# Patient Record
Sex: MALE | Race: WHITE | NOT HISPANIC OR LATINO | URBAN - METROPOLITAN AREA
[De-identification: names, ages, dates, MRNs, and addresses within clinical notes are randomized per-mention and may not be internally consistent; named-entity substitution may affect disease eponyms.]

---

## 2016-10-21 RX ORDER — LEVOTHYROXINE SODIUM 125 MCG
1 TABLET ORAL
Qty: 30 | Refills: 2 | OUTPATIENT
Start: 2016-10-21 | End: 2018-02-12

## 2017-03-04 ENCOUNTER — INPATIENT (INPATIENT)
Facility: HOSPITAL | Age: 52
LOS: 7 days | Discharge: HOME | DRG: 871 | End: 2017-03-12
Attending: HOSPITALIST | Admitting: HOSPITALIST
Payer: COMMERCIAL

## 2017-03-04 VITALS
HEART RATE: 112 BPM | OXYGEN SATURATION: 100 % | DIASTOLIC BLOOD PRESSURE: 130 MMHG | RESPIRATION RATE: 20 BRPM | SYSTOLIC BLOOD PRESSURE: 217 MMHG

## 2017-03-04 DIAGNOSIS — R41.82 ALTERED MENTAL STATUS, UNSPECIFIED: ICD-10-CM

## 2017-03-04 DIAGNOSIS — E13.10 OTHER SPECIFIED DIABETES MELLITUS WITH KETOACIDOSIS WITHOUT COMA: ICD-10-CM

## 2017-03-04 DIAGNOSIS — A41.9 SEPSIS, UNSPECIFIED ORGANISM: ICD-10-CM

## 2017-03-04 DIAGNOSIS — Z90.89 ACQUIRED ABSENCE OF OTHER ORGANS: Chronic | ICD-10-CM

## 2017-03-04 DIAGNOSIS — I10 ESSENTIAL (PRIMARY) HYPERTENSION: ICD-10-CM

## 2017-03-04 DIAGNOSIS — Z98.52 VASECTOMY STATUS: Chronic | ICD-10-CM

## 2017-03-04 DIAGNOSIS — I16.1 HYPERTENSIVE EMERGENCY: ICD-10-CM

## 2017-03-04 DIAGNOSIS — R63.8 OTHER SYMPTOMS AND SIGNS CONCERNING FOOD AND FLUID INTAKE: ICD-10-CM

## 2017-03-04 DIAGNOSIS — I63.9 CEREBRAL INFARCTION, UNSPECIFIED: ICD-10-CM

## 2017-03-04 DIAGNOSIS — J98.8 OTHER SPECIFIED RESPIRATORY DISORDERS: ICD-10-CM

## 2017-03-04 DIAGNOSIS — Z41.8 ENCOUNTER FOR OTHER PROCEDURES FOR PURPOSES OTHER THAN REMEDYING HEALTH STATE: ICD-10-CM

## 2017-03-04 DIAGNOSIS — E03.9 HYPOTHYROIDISM, UNSPECIFIED: ICD-10-CM

## 2017-03-04 LAB
ALBUMIN SERPL ELPH-MCNC: 3 G/DL — LOW (ref 3.4–5)
ALBUMIN SERPL ELPH-MCNC: 3.6 G/DL — SIGNIFICANT CHANGE UP (ref 3.4–5)
ALP SERPL-CCNC: 50 U/L — SIGNIFICANT CHANGE UP (ref 40–120)
ALP SERPL-CCNC: 68 U/L — SIGNIFICANT CHANGE UP (ref 40–120)
ALT FLD-CCNC: 16 U/L — SIGNIFICANT CHANGE UP (ref 12–42)
ALT FLD-CCNC: 21 U/L — SIGNIFICANT CHANGE UP (ref 12–42)
ANION GAP SERPL CALC-SCNC: 12 MMOL/L — SIGNIFICANT CHANGE UP (ref 9–16)
ANION GAP SERPL CALC-SCNC: 14 MMOL/L — SIGNIFICANT CHANGE UP (ref 9–16)
ANION GAP SERPL CALC-SCNC: 20 MMOL/L — HIGH (ref 9–16)
APPEARANCE CSF: CLEAR — SIGNIFICANT CHANGE UP
APPEARANCE UR: CLEAR — SIGNIFICANT CHANGE UP
APTT BLD: 32.7 SEC — SIGNIFICANT CHANGE UP (ref 27.5–37.4)
AST SERPL-CCNC: 15 U/L — SIGNIFICANT CHANGE UP (ref 15–37)
AST SERPL-CCNC: 6 U/L — LOW (ref 15–37)
B-OH-BUTYR SERPL-SCNC: 3.9 MMOL/L — HIGH
BASE EXCESS BLDA CALC-SCNC: -1.3 MMOL/L — SIGNIFICANT CHANGE UP (ref -2–3)
BASE EXCESS BLDV CALC-SCNC: -3.9 MMOL/L — SIGNIFICANT CHANGE UP
BASOPHILS NFR BLD AUTO: 0.1 % — SIGNIFICANT CHANGE UP (ref 0–2)
BILIRUB SERPL-MCNC: 0.7 MG/DL — SIGNIFICANT CHANGE UP (ref 0.2–1.2)
BILIRUB SERPL-MCNC: 1.4 MG/DL — HIGH (ref 0.2–1.2)
BILIRUB UR-MCNC: NEGATIVE — SIGNIFICANT CHANGE UP
BLD GP AB SCN SERPL QL: NEGATIVE — SIGNIFICANT CHANGE UP
BUN SERPL-MCNC: 27 MG/DL — HIGH (ref 7–23)
BUN SERPL-MCNC: 30 MG/DL — HIGH (ref 7–23)
BUN SERPL-MCNC: 30 MG/DL — HIGH (ref 7–23)
CALCIUM SERPL-MCNC: 7.9 MG/DL — LOW (ref 8.5–10.5)
CALCIUM SERPL-MCNC: 8.1 MG/DL — LOW (ref 8.5–10.5)
CALCIUM SERPL-MCNC: 9.2 MG/DL — SIGNIFICANT CHANGE UP (ref 8.5–10.5)
CHLORIDE SERPL-SCNC: 101 MMOL/L — SIGNIFICANT CHANGE UP (ref 96–108)
CHLORIDE SERPL-SCNC: 104 MMOL/L — SIGNIFICANT CHANGE UP (ref 96–108)
CHLORIDE SERPL-SCNC: 92 MMOL/L — LOW (ref 96–108)
CK MB CFR SERPL CALC: <1 NG/ML — SIGNIFICANT CHANGE UP (ref 0.5–3.6)
CK SERPL-CCNC: 92 U/L — SIGNIFICANT CHANGE UP (ref 39–308)
CO2 SERPL-SCNC: 22 MMOL/L — SIGNIFICANT CHANGE UP (ref 22–31)
CO2 SERPL-SCNC: 24 MMOL/L — SIGNIFICANT CHANGE UP (ref 22–31)
CO2 SERPL-SCNC: 24 MMOL/L — SIGNIFICANT CHANGE UP (ref 22–31)
COLOR CSF: SIGNIFICANT CHANGE UP
COLOR SPEC: YELLOW — SIGNIFICANT CHANGE UP
CREAT ?TM UR-MCNC: 175 MG/DL — SIGNIFICANT CHANGE UP
CREAT SERPL-MCNC: 1.37 MG/DL — HIGH (ref 0.5–1.3)
CREAT SERPL-MCNC: 1.51 MG/DL — HIGH (ref 0.5–1.3)
CREAT SERPL-MCNC: 1.62 MG/DL — HIGH (ref 0.5–1.3)
CSF COMMENTS: SIGNIFICANT CHANGE UP
DIFF PNL FLD: (no result)
EOSINOPHIL NFR BLD AUTO: 0 % — SIGNIFICANT CHANGE UP (ref 0–6)
GAS PNL BLDA: SIGNIFICANT CHANGE UP
GAS PNL BLDV: SIGNIFICANT CHANGE UP
GAS PNL BLDV: SIGNIFICANT CHANGE UP
GLUCOSE CSF-MCNC: 230 MG/DL — HIGH (ref 40–70)
GLUCOSE SERPL-MCNC: 212 MG/DL — HIGH (ref 70–99)
GLUCOSE SERPL-MCNC: 416 MG/DL — HIGH (ref 70–99)
GLUCOSE SERPL-MCNC: 487 MG/DL — CRITICAL HIGH (ref 70–99)
GLUCOSE UR QL: >=1000
GRAM STN FLD: SIGNIFICANT CHANGE UP
HCO3 BLDA-SCNC: 22 MMOL/L — SIGNIFICANT CHANGE UP (ref 21–28)
HCO3 BLDV-SCNC: 22 MMOL/L — SIGNIFICANT CHANGE UP (ref 20–27)
HCT VFR BLD CALC: 40.5 % — SIGNIFICANT CHANGE UP (ref 39–50)
HCT VFR BLD CALC: 41.1 % — SIGNIFICANT CHANGE UP (ref 39–50)
HCT VFR BLD CALC: 48.2 % — SIGNIFICANT CHANGE UP (ref 39–50)
HGB BLD-MCNC: 14.8 G/DL — SIGNIFICANT CHANGE UP (ref 13–17)
HGB BLD-MCNC: 15 G/DL — SIGNIFICANT CHANGE UP (ref 13–17)
HGB BLD-MCNC: 17.8 G/DL — HIGH (ref 13–17)
HIV 1+2 AB+HIV1 P24 AG SERPL QL IA: SIGNIFICANT CHANGE UP
INR BLD: 0.99 — SIGNIFICANT CHANGE UP (ref 0.88–1.16)
KETONES UR-MCNC: 40 MG/DL
LACTATE SERPL-SCNC: 2 MMOL/L — SIGNIFICANT CHANGE UP (ref 0.5–2)
LACTATE SERPL-SCNC: 3.1 MMOL/L — HIGH (ref 0.5–2)
LACTATE SERPL-SCNC: 4.1 MMOL/L — CRITICAL HIGH (ref 0.5–2)
LACTATE SERPL-SCNC: 5.8 MMOL/L — CRITICAL HIGH (ref 0.5–2)
LEUKOCYTE ESTERASE UR-ACNC: NEGATIVE — SIGNIFICANT CHANGE UP
LYMPHOCYTES # BLD AUTO: 5.2 % — LOW (ref 13–44)
LYMPHOCYTES # CSF: 2 % — LOW (ref 40–80)
MAGNESIUM SERPL-MCNC: 1.9 MG/DL — SIGNIFICANT CHANGE UP (ref 1.6–2.4)
MCHC RBC-ENTMCNC: 28.7 PG — SIGNIFICANT CHANGE UP (ref 27–34)
MCHC RBC-ENTMCNC: 28.8 PG — SIGNIFICANT CHANGE UP (ref 27–34)
MCHC RBC-ENTMCNC: 29.7 PG — SIGNIFICANT CHANGE UP (ref 27–34)
MCHC RBC-ENTMCNC: 36.5 G/DL — HIGH (ref 32–36)
MCHC RBC-ENTMCNC: 36.5 G/DL — HIGH (ref 32–36)
MCHC RBC-ENTMCNC: 36.9 G/DL — HIGH (ref 32–36)
MCV RBC AUTO: 78.5 FL — LOW (ref 80–100)
MCV RBC AUTO: 78.9 FL — LOW (ref 80–100)
MCV RBC AUTO: 80.3 FL — SIGNIFICANT CHANGE UP (ref 80–100)
MONOCYTES NFR BLD AUTO: 3.3 % — SIGNIFICANT CHANGE UP (ref 2–14)
NEUTROPHILS # CSF: 0 % — SIGNIFICANT CHANGE UP (ref 0–6)
NEUTROPHILS NFR BLD AUTO: 91.4 % — HIGH (ref 43–77)
NITRITE UR-MCNC: NEGATIVE — SIGNIFICANT CHANGE UP
NRBC NFR CSF: 2 /UL — SIGNIFICANT CHANGE UP (ref 0–5)
PCO2 BLDA: 33 MMHG — LOW (ref 35–48)
PCO2 BLDV: 45 MMHG — SIGNIFICANT CHANGE UP (ref 41–51)
PCP SPEC-MCNC: SIGNIFICANT CHANGE UP
PH BLDA: 7.44 — SIGNIFICANT CHANGE UP (ref 7.35–7.45)
PH BLDV: 7.31 — LOW (ref 7.32–7.43)
PH UR: 6 — SIGNIFICANT CHANGE UP (ref 4–8)
PHOSPHATE SERPL-MCNC: 2.3 MG/DL — LOW (ref 2.5–4.5)
PLATELET # BLD AUTO: 287 K/UL — SIGNIFICANT CHANGE UP (ref 150–400)
PLATELET # BLD AUTO: 287 K/UL — SIGNIFICANT CHANGE UP (ref 150–400)
PLATELET # BLD AUTO: 304 K/UL — SIGNIFICANT CHANGE UP (ref 150–400)
PO2 BLDA: 249 MMHG — HIGH (ref 83–108)
PO2 BLDV: 44 MMHG — SIGNIFICANT CHANGE UP
POTASSIUM SERPL-MCNC: 3.1 MMOL/L — LOW (ref 3.5–5.3)
POTASSIUM SERPL-MCNC: 3.1 MMOL/L — LOW (ref 3.5–5.3)
POTASSIUM SERPL-MCNC: 3.7 MMOL/L — SIGNIFICANT CHANGE UP (ref 3.5–5.3)
POTASSIUM SERPL-SCNC: 3.1 MMOL/L — LOW (ref 3.5–5.3)
POTASSIUM SERPL-SCNC: 3.1 MMOL/L — LOW (ref 3.5–5.3)
POTASSIUM SERPL-SCNC: 3.7 MMOL/L — SIGNIFICANT CHANGE UP (ref 3.5–5.3)
PROT CSF-MCNC: 74 MG/DL — HIGH (ref 15–45)
PROT SERPL-MCNC: 6.7 G/DL — SIGNIFICANT CHANGE UP (ref 6.4–8.2)
PROT SERPL-MCNC: 8.1 G/DL — SIGNIFICANT CHANGE UP (ref 6.4–8.2)
PROT UR-MCNC: >=300 MG/DL
PROTHROM AB SERPL-ACNC: 11 SEC — SIGNIFICANT CHANGE UP (ref 10–13.1)
RBC # BLD: 5.16 M/UL — SIGNIFICANT CHANGE UP (ref 4.2–5.8)
RBC # BLD: 5.21 M/UL — SIGNIFICANT CHANGE UP (ref 4.2–5.8)
RBC # BLD: 6 M/UL — HIGH (ref 4.2–5.8)
RBC # CSF: 1 /UL — HIGH (ref 0–0)
RBC # FLD: 12.1 % — SIGNIFICANT CHANGE UP (ref 10.3–16.9)
RBC # FLD: 12.2 % — SIGNIFICANT CHANGE UP (ref 10.3–16.9)
RBC # FLD: 12.4 % — SIGNIFICANT CHANGE UP (ref 10.3–16.9)
RH IG SCN BLD-IMP: POSITIVE — SIGNIFICANT CHANGE UP
SAO2 % BLDA: 100 % — SIGNIFICANT CHANGE UP (ref 95–100)
SAO2 % BLDV: 76 % — SIGNIFICANT CHANGE UP
SODIUM SERPL-SCNC: 134 MMOL/L — LOW (ref 135–145)
SODIUM SERPL-SCNC: 139 MMOL/L — SIGNIFICANT CHANGE UP (ref 135–145)
SODIUM SERPL-SCNC: 140 MMOL/L — SIGNIFICANT CHANGE UP (ref 135–145)
SODIUM UR-SCNC: 12 MMOL/L — SIGNIFICANT CHANGE UP
SP GR SPEC: 1.02 — SIGNIFICANT CHANGE UP (ref 1–1.03)
SPECIMEN SOURCE: SIGNIFICANT CHANGE UP
TROPONIN I SERPL-MCNC: 0.02 NG/ML — SIGNIFICANT CHANGE UP (ref 0.01–0.04)
TSH SERPL-MCNC: 1.13 UIU/ML — SIGNIFICANT CHANGE UP (ref 0.35–4.94)
TUBE TYPE: SIGNIFICANT CHANGE UP
UROBILINOGEN FLD QL: 0.2 E.U./DL — SIGNIFICANT CHANGE UP
WBC # BLD: 17.9 K/UL — HIGH (ref 3.8–10.5)
WBC # BLD: 19.2 K/UL — HIGH (ref 3.8–10.5)
WBC # BLD: 19.3 K/UL — HIGH (ref 3.8–10.5)
WBC # FLD AUTO: 17.9 K/UL — HIGH (ref 3.8–10.5)
WBC # FLD AUTO: 19.2 K/UL — HIGH (ref 3.8–10.5)
WBC # FLD AUTO: 19.3 K/UL — HIGH (ref 3.8–10.5)

## 2017-03-04 PROCEDURE — 99291 CRITICAL CARE FIRST HOUR: CPT | Mod: 25

## 2017-03-04 PROCEDURE — 93010 ELECTROCARDIOGRAM REPORT: CPT | Mod: 76

## 2017-03-04 PROCEDURE — 62270 DX LMBR SPI PNXR: CPT | Mod: GC

## 2017-03-04 PROCEDURE — 71010: CPT | Mod: 26

## 2017-03-04 PROCEDURE — 99223 1ST HOSP IP/OBS HIGH 75: CPT

## 2017-03-04 PROCEDURE — 70496 CT ANGIOGRAPHY HEAD: CPT | Mod: 26

## 2017-03-04 PROCEDURE — 70498 CT ANGIOGRAPHY NECK: CPT | Mod: 26

## 2017-03-04 PROCEDURE — 99223 1ST HOSP IP/OBS HIGH 75: CPT | Mod: 25,GC

## 2017-03-04 PROCEDURE — 70450 CT HEAD/BRAIN W/O DYE: CPT | Mod: 26,59

## 2017-03-04 PROCEDURE — 99253 IP/OBS CNSLTJ NEW/EST LOW 45: CPT | Mod: GC

## 2017-03-04 RX ORDER — ACETAMINOPHEN 500 MG
650 TABLET ORAL ONCE
Qty: 0 | Refills: 0 | Status: COMPLETED | OUTPATIENT
Start: 2017-03-04 | End: 2017-03-04

## 2017-03-04 RX ORDER — SODIUM CHLORIDE 9 MG/ML
1000 INJECTION INTRAMUSCULAR; INTRAVENOUS; SUBCUTANEOUS ONCE
Qty: 0 | Refills: 0 | Status: COMPLETED | OUTPATIENT
Start: 2017-03-04 | End: 2017-03-04

## 2017-03-04 RX ORDER — AMPICILLIN TRIHYDRATE 250 MG
2 CAPSULE ORAL EVERY 4 HOURS
Qty: 0 | Refills: 0 | Status: DISCONTINUED | OUTPATIENT
Start: 2017-03-04 | End: 2017-03-06

## 2017-03-04 RX ORDER — PROPOFOL 10 MG/ML
5 INJECTION, EMULSION INTRAVENOUS
Qty: 1000 | Refills: 0 | Status: DISCONTINUED | OUTPATIENT
Start: 2017-03-04 | End: 2017-03-04

## 2017-03-04 RX ORDER — HEPARIN SODIUM 5000 [USP'U]/ML
5000 INJECTION INTRAVENOUS; SUBCUTANEOUS EVERY 8 HOURS
Qty: 0 | Refills: 0 | Status: DISCONTINUED | OUTPATIENT
Start: 2017-03-04 | End: 2017-03-12

## 2017-03-04 RX ORDER — ACETAMINOPHEN 500 MG
650 TABLET ORAL ONCE
Qty: 0 | Refills: 0 | Status: DISCONTINUED | OUTPATIENT
Start: 2017-03-04 | End: 2017-03-04

## 2017-03-04 RX ORDER — SODIUM CHLORIDE 9 MG/ML
1000 INJECTION INTRAMUSCULAR; INTRAVENOUS; SUBCUTANEOUS
Qty: 0 | Refills: 0 | Status: DISCONTINUED | OUTPATIENT
Start: 2017-03-04 | End: 2017-03-04

## 2017-03-04 RX ORDER — PROPOFOL 10 MG/ML
40 INJECTION, EMULSION INTRAVENOUS ONCE
Qty: 0 | Refills: 0 | Status: COMPLETED | OUTPATIENT
Start: 2017-03-04 | End: 2017-03-04

## 2017-03-04 RX ORDER — AMPICILLIN TRIHYDRATE 250 MG
2 CAPSULE ORAL ONCE
Qty: 0 | Refills: 0 | Status: DISCONTINUED | OUTPATIENT
Start: 2017-03-04 | End: 2017-03-04

## 2017-03-04 RX ORDER — MIDAZOLAM HYDROCHLORIDE 1 MG/ML
3 INJECTION, SOLUTION INTRAMUSCULAR; INTRAVENOUS ONCE
Qty: 0 | Refills: 0 | Status: DISCONTINUED | OUTPATIENT
Start: 2017-03-04 | End: 2017-03-04

## 2017-03-04 RX ORDER — ACETAMINOPHEN 500 MG
650 TABLET ORAL EVERY 6 HOURS
Qty: 0 | Refills: 0 | Status: DISCONTINUED | OUTPATIENT
Start: 2017-03-04 | End: 2017-03-06

## 2017-03-04 RX ORDER — ACYCLOVIR SODIUM 500 MG
950 VIAL (EA) INTRAVENOUS ONCE
Qty: 0 | Refills: 0 | Status: COMPLETED | OUTPATIENT
Start: 2017-03-04 | End: 2017-03-04

## 2017-03-04 RX ORDER — MIDAZOLAM HYDROCHLORIDE 1 MG/ML
2 INJECTION, SOLUTION INTRAMUSCULAR; INTRAVENOUS ONCE
Qty: 0 | Refills: 0 | Status: DISCONTINUED | OUTPATIENT
Start: 2017-03-04 | End: 2017-03-04

## 2017-03-04 RX ORDER — AMPICILLIN TRIHYDRATE 250 MG
2000 CAPSULE ORAL ONCE
Qty: 0 | Refills: 0 | Status: DISCONTINUED | OUTPATIENT
Start: 2017-03-04 | End: 2017-03-04

## 2017-03-04 RX ORDER — VANCOMYCIN HCL 1 G
1500 VIAL (EA) INTRAVENOUS EVERY 12 HOURS
Qty: 0 | Refills: 0 | Status: DISCONTINUED | OUTPATIENT
Start: 2017-03-05 | End: 2017-03-07

## 2017-03-04 RX ORDER — CEFEPIME 1 G/1
INJECTION, POWDER, FOR SOLUTION INTRAMUSCULAR; INTRAVENOUS
Qty: 0 | Refills: 0 | Status: DISCONTINUED | OUTPATIENT
Start: 2017-03-04 | End: 2017-03-04

## 2017-03-04 RX ORDER — POTASSIUM CHLORIDE 20 MEQ
40 PACKET (EA) ORAL ONCE
Qty: 0 | Refills: 0 | Status: COMPLETED | OUTPATIENT
Start: 2017-03-04 | End: 2017-03-04

## 2017-03-04 RX ORDER — POTASSIUM CHLORIDE 20 MEQ
10 PACKET (EA) ORAL
Qty: 0 | Refills: 0 | Status: COMPLETED | OUTPATIENT
Start: 2017-03-04 | End: 2017-03-04

## 2017-03-04 RX ORDER — CEFEPIME 1 G/1
2000 INJECTION, POWDER, FOR SOLUTION INTRAMUSCULAR; INTRAVENOUS ONCE
Qty: 0 | Refills: 0 | Status: DISCONTINUED | OUTPATIENT
Start: 2017-03-04 | End: 2017-03-04

## 2017-03-04 RX ORDER — ACYCLOVIR SODIUM 500 MG
VIAL (EA) INTRAVENOUS
Qty: 0 | Refills: 0 | Status: DISCONTINUED | OUTPATIENT
Start: 2017-03-04 | End: 2017-03-06

## 2017-03-04 RX ORDER — PANTOPRAZOLE SODIUM 20 MG/1
40 TABLET, DELAYED RELEASE ORAL DAILY
Qty: 0 | Refills: 0 | Status: DISCONTINUED | OUTPATIENT
Start: 2017-03-04 | End: 2017-03-06

## 2017-03-04 RX ORDER — ETOMIDATE 2 MG/ML
20 INJECTION INTRAVENOUS ONCE
Qty: 0 | Refills: 0 | Status: COMPLETED | OUTPATIENT
Start: 2017-03-04 | End: 2017-03-04

## 2017-03-04 RX ORDER — INSULIN HUMAN 100 [IU]/ML
10 INJECTION, SOLUTION SUBCUTANEOUS ONCE
Qty: 0 | Refills: 0 | Status: COMPLETED | OUTPATIENT
Start: 2017-03-04 | End: 2017-03-04

## 2017-03-04 RX ORDER — FENTANYL CITRATE 50 UG/ML
50 INJECTION INTRAVENOUS ONCE
Qty: 0 | Refills: 0 | Status: DISCONTINUED | OUTPATIENT
Start: 2017-03-04 | End: 2017-03-04

## 2017-03-04 RX ORDER — SODIUM CHLORIDE 9 MG/ML
1000 INJECTION, SOLUTION INTRAVENOUS
Qty: 0 | Refills: 0 | Status: DISCONTINUED | OUTPATIENT
Start: 2017-03-04 | End: 2017-03-05

## 2017-03-04 RX ORDER — INSULIN HUMAN 100 [IU]/ML
5 INJECTION, SOLUTION SUBCUTANEOUS
Qty: 250 | Refills: 0 | Status: DISCONTINUED | OUTPATIENT
Start: 2017-03-04 | End: 2017-03-05

## 2017-03-04 RX ORDER — PROPOFOL 10 MG/ML
5 INJECTION, EMULSION INTRAVENOUS
Qty: 1000 | Refills: 0 | Status: DISCONTINUED | OUTPATIENT
Start: 2017-03-04 | End: 2017-03-06

## 2017-03-04 RX ORDER — INSULIN HUMAN 100 [IU]/ML
10 INJECTION, SOLUTION SUBCUTANEOUS
Qty: 250 | Refills: 0 | Status: DISCONTINUED | OUTPATIENT
Start: 2017-03-04 | End: 2017-03-04

## 2017-03-04 RX ORDER — INSULIN LISPRO 100/ML
VIAL (ML) SUBCUTANEOUS
Qty: 0 | Refills: 0 | Status: DISCONTINUED | OUTPATIENT
Start: 2017-03-04 | End: 2017-03-05

## 2017-03-04 RX ORDER — ACYCLOVIR SODIUM 500 MG
950 VIAL (EA) INTRAVENOUS EVERY 8 HOURS
Qty: 0 | Refills: 0 | Status: DISCONTINUED | OUTPATIENT
Start: 2017-03-05 | End: 2017-03-06

## 2017-03-04 RX ORDER — PROPOFOL 10 MG/ML
50 INJECTION, EMULSION INTRAVENOUS ONCE
Qty: 0 | Refills: 0 | Status: COMPLETED | OUTPATIENT
Start: 2017-03-04 | End: 2017-03-04

## 2017-03-04 RX ORDER — AMPICILLIN TRIHYDRATE 250 MG
CAPSULE ORAL
Qty: 0 | Refills: 0 | Status: DISCONTINUED | OUTPATIENT
Start: 2017-03-04 | End: 2017-03-04

## 2017-03-04 RX ORDER — CHLORHEXIDINE GLUCONATE 213 G/1000ML
15 SOLUTION TOPICAL
Qty: 0 | Refills: 0 | Status: DISCONTINUED | OUTPATIENT
Start: 2017-03-04 | End: 2017-03-07

## 2017-03-04 RX ORDER — VANCOMYCIN HCL 1 G
1500 VIAL (EA) INTRAVENOUS ONCE
Qty: 0 | Refills: 0 | Status: COMPLETED | OUTPATIENT
Start: 2017-03-04 | End: 2017-03-04

## 2017-03-04 RX ORDER — POTASSIUM CHLORIDE 20 MEQ
40 PACKET (EA) ORAL ONCE
Qty: 0 | Refills: 0 | Status: COMPLETED | OUTPATIENT
Start: 2017-03-04 | End: 2017-03-05

## 2017-03-04 RX ORDER — LEVOTHYROXINE SODIUM 125 MCG
100 TABLET ORAL DAILY
Qty: 0 | Refills: 0 | Status: DISCONTINUED | OUTPATIENT
Start: 2017-03-04 | End: 2017-03-04

## 2017-03-04 RX ORDER — PANTOPRAZOLE SODIUM 20 MG/1
40 TABLET, DELAYED RELEASE ORAL ONCE
Qty: 0 | Refills: 0 | Status: COMPLETED | OUTPATIENT
Start: 2017-03-04 | End: 2017-03-04

## 2017-03-04 RX ORDER — ROCURONIUM BROMIDE 10 MG/ML
80 VIAL (ML) INTRAVENOUS ONCE
Qty: 0 | Refills: 0 | Status: COMPLETED | OUTPATIENT
Start: 2017-03-04 | End: 2017-03-04

## 2017-03-04 RX ORDER — CEFEPIME 1 G/1
2000 INJECTION, POWDER, FOR SOLUTION INTRAMUSCULAR; INTRAVENOUS EVERY 8 HOURS
Qty: 0 | Refills: 0 | Status: COMPLETED | OUTPATIENT
Start: 2017-03-04 | End: 2017-03-06

## 2017-03-04 RX ORDER — SODIUM CHLORIDE 9 MG/ML
1000 INJECTION INTRAMUSCULAR; INTRAVENOUS; SUBCUTANEOUS ONCE
Qty: 0 | Refills: 0 | Status: DISCONTINUED | OUTPATIENT
Start: 2017-03-04 | End: 2017-03-04

## 2017-03-04 RX ORDER — LEVOTHYROXINE SODIUM 125 MCG
50 TABLET ORAL DAILY
Qty: 0 | Refills: 0 | Status: DISCONTINUED | OUTPATIENT
Start: 2017-03-04 | End: 2017-03-06

## 2017-03-04 RX ORDER — INSULIN GLARGINE 100 [IU]/ML
30 INJECTION, SOLUTION SUBCUTANEOUS AT BEDTIME
Qty: 0 | Refills: 0 | Status: COMPLETED | OUTPATIENT
Start: 2017-03-04 | End: 2017-03-04

## 2017-03-04 RX ORDER — PIPERACILLIN AND TAZOBACTAM 4; .5 G/20ML; G/20ML
3.38 INJECTION, POWDER, LYOPHILIZED, FOR SOLUTION INTRAVENOUS ONCE
Qty: 0 | Refills: 0 | Status: COMPLETED | OUTPATIENT
Start: 2017-03-04 | End: 2017-03-04

## 2017-03-04 RX ADMIN — PROPOFOL 40 MILLIGRAM(S): 10 INJECTION, EMULSION INTRAVENOUS at 13:58

## 2017-03-04 RX ADMIN — FENTANYL CITRATE 50 MICROGRAM(S): 50 INJECTION INTRAVENOUS at 16:02

## 2017-03-04 RX ADMIN — Medication 40 MILLIEQUIVALENT(S): at 21:08

## 2017-03-04 RX ADMIN — Medication 650 MILLIGRAM(S): at 14:30

## 2017-03-04 RX ADMIN — Medication 300 MILLIGRAM(S): at 16:52

## 2017-03-04 RX ADMIN — PROPOFOL 40 MILLIGRAM(S): 10 INJECTION, EMULSION INTRAVENOUS at 17:10

## 2017-03-04 RX ADMIN — Medication 100 MILLIEQUIVALENT(S): at 21:30

## 2017-03-04 RX ADMIN — MIDAZOLAM HYDROCHLORIDE 2 MILLIGRAM(S): 1 INJECTION, SOLUTION INTRAMUSCULAR; INTRAVENOUS at 15:50

## 2017-03-04 RX ADMIN — PANTOPRAZOLE SODIUM 40 MILLIGRAM(S): 20 TABLET, DELAYED RELEASE ORAL at 16:01

## 2017-03-04 RX ADMIN — HEPARIN SODIUM 5000 UNIT(S): 5000 INJECTION INTRAVENOUS; SUBCUTANEOUS at 21:30

## 2017-03-04 RX ADMIN — SODIUM CHLORIDE 1000 MILLILITER(S): 9 INJECTION INTRAMUSCULAR; INTRAVENOUS; SUBCUTANEOUS at 16:00

## 2017-03-04 RX ADMIN — ETOMIDATE 20 MILLIGRAM(S): 2 INJECTION INTRAVENOUS at 13:41

## 2017-03-04 RX ADMIN — SODIUM CHLORIDE 1333.33 MILLILITER(S): 9 INJECTION INTRAMUSCULAR; INTRAVENOUS; SUBCUTANEOUS at 20:00

## 2017-03-04 RX ADMIN — SODIUM CHLORIDE 1000 MILLILITER(S): 9 INJECTION INTRAMUSCULAR; INTRAVENOUS; SUBCUTANEOUS at 15:20

## 2017-03-04 RX ADMIN — Medication 650 MILLIGRAM(S): at 17:15

## 2017-03-04 RX ADMIN — Medication 216 GRAM(S): at 19:15

## 2017-03-04 RX ADMIN — PROPOFOL 50 MILLIGRAM(S): 10 INJECTION, EMULSION INTRAVENOUS at 14:00

## 2017-03-04 RX ADMIN — Medication 2: at 23:44

## 2017-03-04 RX ADMIN — PROPOFOL 2.73 MICROGRAM(S)/KG/MIN: 10 INJECTION, EMULSION INTRAVENOUS at 20:01

## 2017-03-04 RX ADMIN — Medication 269 MILLIGRAM(S): at 23:48

## 2017-03-04 RX ADMIN — Medication 650 MILLIGRAM(S): at 21:00

## 2017-03-04 RX ADMIN — MIDAZOLAM HYDROCHLORIDE 3 MILLIGRAM(S): 1 INJECTION, SOLUTION INTRAMUSCULAR; INTRAVENOUS at 15:42

## 2017-03-04 RX ADMIN — SODIUM CHLORIDE 1000 MILLILITER(S): 9 INJECTION INTRAMUSCULAR; INTRAVENOUS; SUBCUTANEOUS at 13:50

## 2017-03-04 RX ADMIN — Medication 216 GRAM(S): at 23:26

## 2017-03-04 RX ADMIN — SODIUM CHLORIDE 1000 MILLILITER(S): 9 INJECTION INTRAMUSCULAR; INTRAVENOUS; SUBCUTANEOUS at 17:18

## 2017-03-04 RX ADMIN — INSULIN HUMAN 10 UNIT(S)/HR: 100 INJECTION, SOLUTION SUBCUTANEOUS at 15:30

## 2017-03-04 RX ADMIN — INSULIN GLARGINE 30 UNIT(S): 100 INJECTION, SOLUTION SUBCUTANEOUS at 21:30

## 2017-03-04 RX ADMIN — PIPERACILLIN AND TAZOBACTAM 200 GRAM(S): 4; .5 INJECTION, POWDER, LYOPHILIZED, FOR SOLUTION INTRAVENOUS at 16:24

## 2017-03-04 RX ADMIN — Medication 80 MILLIGRAM(S): at 13:41

## 2017-03-04 RX ADMIN — CEFEPIME 100 MILLIGRAM(S): 1 INJECTION, POWDER, FOR SOLUTION INTRAMUSCULAR; INTRAVENOUS at 22:00

## 2017-03-04 RX ADMIN — FENTANYL CITRATE 50 MICROGRAM(S): 50 INJECTION INTRAVENOUS at 16:24

## 2017-03-04 RX ADMIN — INSULIN HUMAN 10 UNIT(S): 100 INJECTION, SOLUTION SUBCUTANEOUS at 13:39

## 2017-03-04 RX ADMIN — PROPOFOL 50 MILLIGRAM(S): 10 INJECTION, EMULSION INTRAVENOUS at 14:21

## 2017-03-04 RX ADMIN — PROPOFOL 2.73 MICROGRAM(S)/KG/MIN: 10 INJECTION, EMULSION INTRAVENOUS at 13:58

## 2017-03-04 RX ADMIN — Medication 100 MILLIEQUIVALENT(S): at 22:31

## 2017-03-04 RX ADMIN — Medication 100 MILLIEQUIVALENT(S): at 20:30

## 2017-03-04 NOTE — H&P ADULT - PROBLEM SELECTOR PLAN 2
intubated for airway protection in ED, airway compromise due to AMS, no known underlying lung pathology. intubated for airway protection in ED, airway compromise due to AMS, no known underlying lung pathology, ETT in good position.  -keep intubated and sedated

## 2017-03-04 NOTE — ED CLERICAL - CLERICAL COMMENTS
STROKE CODE CALLED 1:48P (NF) STROKE CODE CALLED 1:48P (NF); PROVIDER SPOKE TO STROKE ATTENDING (DR. PUENTES) 1:52P

## 2017-03-04 NOTE — CONSULT NOTE ADULT - SUBJECTIVE AND OBJECTIVE BOX
Vascular Attending:        HPI:  50 yo M with  uncontrolled DM, HTN, hypothyroidism was BIBEMS for slurred speech and weakness on left side. Source of HPI is pt's fiance and ED documentation as pt is currently intubated and sedated. As per pt's fiance he was in his usual state of health until yesterday 8PM, when she noted he was weak and had slurred speech. They went out to dinner and she noted he was very slow (which is unsusual for him). At dinner she noted he has a left facial droop, which he said is all 2/2 his BP meds. Then she noted him overnight to be nauseous, he started vomiting, he was incontinent to urine and feces.  She states when he was walking he was wobbly, but later towards the morning he wasn't even able to get out of bed. This AM when she woke up she noted he was shaking/rigoring so she called EMS. Upon arrival of ambulance he was noted to be lethargic, but responding to commands slowly - at the time his GCS was deemed to be 11. Upon arrival to ED his GCS was ntoed to be 7, pt was obtunded so he was intubated for airway protection. Stroke code was called, pt went for CT head which showed new infarct in woody, CTA head&neck however did not show any stenosis.  Upon chart review noted, the pt was admitted for cellulitis in [facility], had I&D by vascular surgery, was supposed to fu with  as outpatient, but wasn't able to make appointment.   PMH: DM, HTN, hypothyroidism    In ED pt was febrile to 102,. tachycardic to 130, hypertensive to 250/180.  He was given Vanc, Zosyn, started on an insulin gtt and given 3L NS. (04 Mar 2017 16:55)    Consulted due to patients history of Lt anterior shin debridement and vac placement 10/28.  Post op was supposed to f/u with Dr. Wheat for skin graft but never did. Now wound is healed over.    PAST MEDICAL & SURGICAL HISTORY:  DM (diabetes mellitus)  Hypothyroid  Hyperlipidemia  Hypertension  History of tonsillectomy  H/O vasectomy      MEDICATIONS  (STANDING):  propofol Infusion 5MICROgram(s)/kG/Min IV Continuous <Continuous>  heparin  Injectable 5000Unit(s) SubCutaneous every 8 hours  chlorhexidine 0.12% Liquid 15milliLiter(s) Swish and Spit two times a day  pantoprazole  Injectable 40milliGRAM(s) IV Push daily  insulin Infusion 5Unit(s)/Hr IV Continuous <Continuous>  potassium chloride   Powder 40milliEquivalent(s) Oral once  ampicillin  IVPB 2Gram(s) IV Intermittent every 4 hours  cefepime  IVPB 2000milliGRAM(s) IV Intermittent every 8 hours  levothyroxine Injectable 50MICROGram(s) IV Push daily  insulin lispro (HumaLOG) corrective regimen sliding scale  SubCutaneous three times a day before meals  dextrose 5% + sodium chloride 0.9%. 1000milliLiter(s) IV Continuous <Continuous>    MEDICATIONS  (PRN):  acetaminophen    Suspension 650milliGRAM(s) Oral every 6 hours PRN For Temp greater than 38 C (100.4 F)      Allergies    No Known Allergies    Intolerances        SOCIAL HISTORY:    FAMILY HISTORY:  Family history of lung cancer (Father, Mother)      Vital Signs Last 24 Hrs  T(C): 38.7, Max: 39.1 (- @ 19:45)  T(F): 101.6, Max: 102.3 (- @ 19:45)  HR: 96 (70 - 128)  BP: 158/83 (135/79 - 278/141)  BP(mean): 116 (103 - 200)  RR: 16 (12 - 20)  SpO2: 100% (97% - 100%)    PHYSICAL EXAM:      Constitutional: Sedated    Respiratory: intubated    Cardiovascular: s1s2 rrr    Gastrointestinal: soft nt/nd    Extremities: b/l LE +psoriasis lesions throughout LLE- healing wound at anterior shin  RLE- small dry healed over ulcer on posterior ankle and another over anterior shin    Vascular: b/l LE 2+ pal DP/POP/FEM biphasic PT    LABS:                        15.0   17.9  )-----------( 287      ( 04 Mar 2017 19:35 )             41.1     04 Mar 2017 19:35    140    |  104    |  27     ----------------------------<  212    3.1     |  24     |  1.51     Ca    7.9        04 Mar 2017 19:35  Phos  2.3       04 Mar 2017 19:35  Mg     1.9       04 Mar 2017 19:35    TPro  6.7    /  Alb  3.0    /  TBili  0.7    /  DBili  x      /  AST  6      /  ALT  16     /  AlkPhos  50     04 Mar 2017 19:35    PT/INR - ( 04 Mar 2017 13:49 )   PT: 11.0 sec;   INR: 0.99          PTT - ( 04 Mar 2017 13:49 )  PTT:32.7 sec  Urinalysis Basic - ( 04 Mar 2017 15:05 )    Color: Yellow / Appearance: Clear / S.020 / pH: x  Gluc: x / Ketone: 40 mg/dL  / Bili: NEGATIVE / Urobili: 0.2 E.U./dL   Blood: x / Protein: >=300 mg/dL / Nitrite: NEGATIVE   Leuk Esterase: NEGATIVE / RBC: < 5 /HPF / WBC < 5 /HPF   Sq Epi: x / Non Sq Epi: Few /HPF / Bacteria: Present /HPF        RADIOLOGY & ADDITIONAL STUDIES Vascular Attending:        HPI:  50 yo M with  uncontrolled DM, HTN, hypothyroidism was BIBEMS for slurred speech and weakness on left side. Source of HPI is pt's fiance and ED documentation as pt is currently intubated and sedated. As per pt's fiance he was in his usual state of health until yesterday 8PM, when she noted he was weak and had slurred speech. They went out to dinner and she noted he was very slow (which is unsusual for him). At dinner she noted he has a left facial droop, which he said is all 2/2 his BP meds. Then she noted him overnight to be nauseous, he started vomiting, he was incontinent to urine and feces.  She states when he was walking he was wobbly, but later towards the morning he wasn't even able to get out of bed. This AM when she woke up she noted he was shaking/rigoring so she called EMS. Upon arrival of ambulance he was noted to be lethargic, but responding to commands slowly - at the time his GCS was deemed to be 11. Upon arrival to ED his GCS was noted to be 7, pt was obtunded so he was intubated for airway protection. Stroke code was called, pt went for CT head which showed new infarct in woody, CTA head&neck however did not show any stenosis.  Upon chart review noted, the pt was admitted for cellulitis in [facility], had I&D by vascular surgery, was supposed to fu with  as outpatient, but wasn't able to make appointment.   PMH: DM, HTN, hypothyroidism    In ED pt was febrile to 102,. tachycardic to 130, hypertensive to 250/180.  He was given Vanc, Zosyn, started on an insulin gtt and given 3L NS. (04 Mar 2017 16:55)    Consulted due to patients history of Lt anterior shin debridement and vac placement 10/28.  Post op was supposed to f/u with Dr. Wheat for skin graft but never did. Now wound is healed over.    PAST MEDICAL & SURGICAL HISTORY:  DM (diabetes mellitus)  Hypothyroid  Hyperlipidemia  Hypertension  History of tonsillectomy  H/O vasectomy      MEDICATIONS  (STANDING):  propofol Infusion 5MICROgram(s)/kG/Min IV Continuous <Continuous>  heparin  Injectable 5000Unit(s) SubCutaneous every 8 hours  chlorhexidine 0.12% Liquid 15milliLiter(s) Swish and Spit two times a day  pantoprazole  Injectable 40milliGRAM(s) IV Push daily  insulin Infusion 5Unit(s)/Hr IV Continuous <Continuous>  potassium chloride   Powder 40milliEquivalent(s) Oral once  ampicillin  IVPB 2Gram(s) IV Intermittent every 4 hours  cefepime  IVPB 2000milliGRAM(s) IV Intermittent every 8 hours  levothyroxine Injectable 50MICROGram(s) IV Push daily  insulin lispro (HumaLOG) corrective regimen sliding scale  SubCutaneous three times a day before meals  dextrose 5% + sodium chloride 0.9%. 1000milliLiter(s) IV Continuous <Continuous>    MEDICATIONS  (PRN):  acetaminophen    Suspension 650milliGRAM(s) Oral every 6 hours PRN For Temp greater than 38 C (100.4 F)      Allergies    No Known Allergies    Intolerances        SOCIAL HISTORY:    FAMILY HISTORY:  Family history of lung cancer (Father, Mother)      Vital Signs Last 24 Hrs  T(C): 38.7, Max: 39.1 ( @ 19:45)  T(F): 101.6, Max: 102.3 (- @ 19:45)  HR: 96 (70 - 128)  BP: 158/83 (135/79 - 278/141)  BP(mean): 116 (103 - 200)  RR: 16 (12 - 20)  SpO2: 100% (97% - 100%)    PHYSICAL EXAM:      Constitutional: Sedated    Respiratory: intubated    Cardiovascular: s1s2 rrr    Gastrointestinal: soft nt/nd    Extremities: b/l LE +psoriasis lesions throughout LLE- healing wound at anterior shin  RLE- small dry healed over ulcer on posterior ankle and another over anterior shin    Vascular: b/l LE 2+ pal DP/POP/FEM biphasic PT    LABS:                        15.0   17.9  )-----------( 287      ( 04 Mar 2017 19:35 )             41.1     04 Mar 2017 19:35    140    |  104    |  27     ----------------------------<  212    3.1     |  24     |  1.51     Ca    7.9        04 Mar 2017 19:35  Phos  2.3       04 Mar 2017 19:35  Mg     1.9       04 Mar 2017 19:35    TPro  6.7    /  Alb  3.0    /  TBili  0.7    /  DBili  x      /  AST  6      /  ALT  16     /  AlkPhos  50     04 Mar 2017 19:35    PT/INR - ( 04 Mar 2017 13:49 )   PT: 11.0 sec;   INR: 0.99          PTT - ( 04 Mar 2017 13:49 )  PTT:32.7 sec  Urinalysis Basic - ( 04 Mar 2017 15:05 )    Color: Yellow / Appearance: Clear / S.020 / pH: x  Gluc: x / Ketone: 40 mg/dL  / Bili: NEGATIVE / Urobili: 0.2 E.U./dL   Blood: x / Protein: >=300 mg/dL / Nitrite: NEGATIVE   Leuk Esterase: NEGATIVE / RBC: < 5 /HPF / WBC < 5 /HPF   Sq Epi: x / Non Sq Epi: Few /HPF / Bacteria: Present /HPF

## 2017-03-04 NOTE — ED PROVIDER NOTE - CRITICAL CARE PROVIDED
additional history taking/direct patient care (not related to procedure)/consultation with other physicians/consult w/ pt's family directly relating to pts condition/documentation/interpretation of diagnostic studies

## 2017-03-04 NOTE — H&P ADULT - PROBLEM SELECTOR PLAN 6
240/180 BP on admission, however pt septic, no BP meds given , now /80 -will still hold off BP meds in setting of sepsis as it is likely 2/2 severe sepsis  -labetalol pushes as needed

## 2017-03-04 NOTE — ED ADULT TRIAGE NOTE - CHIEF COMPLAINT QUOTE
Pt BIBA from home. EMS states "slurred speech started last night at 8PM, he was incontinent throughout last night, then he was found confused and unable to follow command this morning. BP was 209/132 and FS was 457 when we arrived." Pt is lethargic and unable to speak nor follow command at this time.  Hx of HTN, DM. Pt has been upgraded to [facility] and taken to Resus room. Pt BIBA from home. EMS states "slurred speech started last night at 8PM, he was incontinent throughout last night, then he was found confused and unable to follow command this morning. BP was 209/132 and FS was 457 when we arrived." As per pt's fiance, he was also vomiting last night. Pt is lethargic and unable to speak nor follow command at this time.  Hx of HTN, DM, hypothyroid. Pt has been upgraded to MD Olivares and taken to Resus room.

## 2017-03-04 NOTE — CONSULT NOTE ADULT - SUBJECTIVE AND OBJECTIVE BOX
ICU CONSULT NOTE    Patient is a 51y old  Male with PMH of HTN, HLD, hypothyroidism and DM2 who presents with a chief complaint of AMS.  Most of history obtained from fiance and medical records.  Per the fiance last night she came home from work and found the patient to be a bit loopy then they went out to dinner and she noticed he wasn't as talkative as usual and she noticed he had a slight left facial droop they then went home and the patient turned on the stove to make tea and then went to bed without having tea or turning off the stove which the fiance thought was odd.  Then through the night the patient was waking up frequently to vomit and had stool and urine incontinence and rigors at home with continuing AMS and ultimately lethargy.  The fiance then called EMS this morning and the patient was brought to the ED.  No recent travel, not currently working or have any recent sick exposures.  In the ED patient was intubated for GCS of 7, stroke code was called for right gaze deviation and left hemiplegia symptoms, however deemed low suspicion for stroke per neurology.    PAST MEDICAL & SURGICAL HISTORY:  DM (diabetes mellitus)  Hypothyroid  Hyperlipidemia  Hypertension  History of tonsillectomy  H/O vasectomy      MEDICATIONS  (STANDING):  propofol Infusion 5MICROgram(s)/kG/Min IV Continuous <Continuous>  heparin  Injectable 5000Unit(s) SubCutaneous every 8 hours  chlorhexidine 0.12% Liquid 15milliLiter(s) Swish and Spit two times a day  pantoprazole  Injectable 40milliGRAM(s) IV Push daily  insulin Infusion 5Unit(s)/Hr IV Continuous <Continuous>  potassium chloride  10 mEq/100 mL IVPB 10milliEquivalent(s) IV Intermittent every 1 hour  potassium chloride   Powder 40milliEquivalent(s) Oral once  ampicillin  IVPB 2Gram(s) IV Intermittent every 4 hours  cefepime  IVPB 2000milliGRAM(s) IV Intermittent every 8 hours  levothyroxine Injectable 50MICROGram(s) IV Push daily  insulin glargine Injectable (LANTUS) 30Unit(s) SubCutaneous at bedtime  insulin lispro (HumaLOG) corrective regimen sliding scale  SubCutaneous three times a day before meals    MEDICATIONS  (PRN):       Allergies    No Known Allergies    Intolerances        PHYSICAL EXAM   Vital Signs Last 24 Hrs  T(C): 39.1, Max: 39.1 (03-04 @ 19:45)  T(F): 102.3, Max: 102.3 ( @ 19:45)  HR: 98 (70 - 128)  BP: 179/71 (135/79 - 278/141)  BP(mean): 112 (103 - 200)  RR: 16 (12 - 20)  SpO2: 99% (97% - 100%)    I&O's Detail    I & Os for current day (as of 04 Mar 2017 21:18)  =============================================  IN:    Total IN: 0 ml  ---------------------------------------------  OUT:    Indwelling Catheter - Urethral: 500 ml    Voided: 350 ml    Total OUT: 850 ml  ---------------------------------------------  Total NET: -850 ml      General - intubated and sedated, actively rigoring during exam.  HEENT - PERRL, dry mucous membranes, no JVD  CV - tachycardic, no m/r/g, regular rhythm  Resp - CTAB no w/r/r, upper airway sounds transmited from vent  Abdomen - soft, NT, +BS, no rigidity, no rebound, fungal rash on chest  Extremities - warm well perfused, 2x4cm hard indurated lesion on left shin, no drainage, but surrounding errythema.  Skin - warm and dry, fungal appearing rash on chest and b/l LE       LABS                        15.0   17.9  )-----------( 287      ( 04 Mar 2017 19:35 )             41.1     04 Mar 2017 19:35    140    |  104    |  27     ----------------------------<  212    3.1     |  24     |  1.51     Ca    7.9        04 Mar 2017 19:35  Phos  2.3       04 Mar 2017 19:35  Mg     1.9       04 Mar 2017 19:35    TPro  6.7    /  Alb  3.0    /  TBili  0.7    /  DBili  x      /  AST  6      /  ALT  16     /  AlkPhos  50     04 Mar 2017 19:35    PT/INR - ( 04 Mar 2017 13:49 )   PT: 11.0 sec;   INR: 0.99          PTT - ( 04 Mar 2017 13:49 )  PTT:32.7 sec  Lactate, Blood: 4.1 mmoL/L ( @ 19:35)  Lactate, Blood: 3.1 mmoL/L ( @ 16:18)  Lactate, Blood: 5.8 mmoL/L ( @ 15:02)    Urinalysis Basic - ( 04 Mar 2017 15:05 )    Color: Yellow / Appearance: Clear / S.020 / pH: x  Gluc: x / Ketone: 40 mg/dL  / Bili: NEGATIVE / Urobili: 0.2 E.U./dL   Blood: x / Protein: >=300 mg/dL / Nitrite: NEGATIVE   Leuk Esterase: NEGATIVE / RBC: < 5 /HPF / WBC < 5 /HPF   Sq Epi: x / Non Sq Epi: Few /HPF / Bacteria: Present /HPF            IMAGING   CXR - no definitive infiltrate  EKG - sinus tachycardia

## 2017-03-04 NOTE — H&P ADULT - PROBLEM SELECTOR PLAN 1
pt with sudden AMS, ddx includes primary neurologic causes or systemic. 1 neuro - stroke (CT head did show possible infarct in woody), seizure (unlikely, no witness of seizure - however cannot r/o as per fiance pt was shaking), infection - very high on differential in setting of fever, rigors, leukocytosis- possible bacterial meningitis. TBI, autoimmune enecphalitis or neurodegenerative process very low on differential, former due to no history of trauma, latter two due to acuity in onset. Other possible cause are in their systemic causes group - DKA, sepsis, medication induced- however Utox negative.  -LP to r/o bacterial meningitis  -start Cefepime, Ampicilin and vanco for Bacterial meningitis  -droplet precautions  -fu BCx  -NS 4th L running now  -repeat lactate until cleared  -despite stroke  no place for tPA as last known normal was yesterday evening pt with sudden AMS, ddx includes primary neurologic causes or systemic. 1 neuro - stroke (CT head did show possible infarct in woody - decreased attenuation), seizure (unlikely, no witness of seizure - however cannot r/o as per fiance pt was shaking), infection - very high on differential in setting of fever, rigors, leukocytosis- possible bacterial meningitis. TBI, autoimmune enecphalitis or neurodegenerative process very low on differential, former due to no history of trauma, latter two due to acuity in onset. Other possible cause are in their systemic causes group - DKA, sepsis, medication induced- however Utox negative.  -LP to r/o bacterial meningitis  -start Cefepime, Ampicilin and vanco for Bacterial meningitis  -droplet precautions  -fu BCx  -NS 4th L running now  -repeat lactate until cleared  -despite stroke  no place for tPA as last known normal was yesterday evening pt with sudden AMS, ddx includes primary neurologic causes or systemic. 1 neuro - stroke (CT head did show possible infarct in woody - decreased attenuation), seizure (unlikely, no witness of seizure - however cannot r/o as per [facility] pt was shaking), infection - very high on differential in setting of fever, rigors, leukocytosis- possible bacterial meningitis. TBI, autoimmune enecphalitis or neurodegenerative process very low on differential, former due to no history of trauma, latter two due to acuity in onset. Other possible cause are in their systemic causes group - DKA, sepsis, medication induced- however Utox negative.  -LP to r/o bacterial meningitis  -start Cefepime, Ampicilin and vanco for Bacterial meningitis  -droplet precautions  -fu BCx  -NS 4th L running now  -repeat lactate until cleared  -despite stroke  no place for tPA as last known normal was yesterday evening  -check HIV (consent obtained from [facility])

## 2017-03-04 NOTE — ED PROVIDER NOTE - DIAGNOSTIC INTERPRETATION
ER Physician: Elaina Olivares MD  CHEST XRAY INTERPRETATION: lungs clear, heart shadow normal, bony structures intact

## 2017-03-04 NOTE — H&P ADULT - NSHPLABSRESULTS_GEN_ALL_CORE
14.8   19.3  )-----------( 287      ( 04 Mar 2017 16:18 )             40.5   04 Mar 2017 16:18    139    |  101    |  30     ----------------------------<  416    3.1     |  24     |  1.62     Ca    8.1        04 Mar 2017 16:18    TPro  8.1    /  Alb  3.6    /  TBili  1.4    /  DBili  x      /  AST  15     /  ALT  21     /  AlkPhos  68     04 Mar 2017 13:49

## 2017-03-04 NOTE — ED ADULT NURSE NOTE - CHIEF COMPLAINT QUOTE
Pt BIBA from home. EMS states "slurred speech started last night at 8PM, he was incontinent throughout last night, then he was found confused and unable to follow command this morning. BP was 209/132 and FS was 457 when we arrived." As per pt's fiance, he was also vomiting last night. Pt is lethargic and unable to speak nor follow command at this time.  Hx of HTN, DM, hypothyroid. Pt has been upgraded to MD Olivares and taken to Resus room.

## 2017-03-04 NOTE — CONSULT NOTE ADULT - PROBLEM SELECTOR RECOMMENDATION 9
unclear etiology at this time however likely from severe sepsis with suspicion for meningitis in setting of AMS and fevers with rigor, CT head showing acute infarct but CT angio benign and per neurology attending less likely ischemic event, possible DKA contributing to AMS.  -pt intubated and sedated in ED for GCS score of 7 on arrival and for protection of airway, stroke code called however AMS likely to other etiology per neurology.  -pt in DKA although less likely major cause of AMS, will continue to treat with insulin drip.  - pt with severe sepsis with tachycardia, rigors/fevers, wbc count elevation and elevated lactic acid, s/p 4L NS in ED, BP elevated, not requiring pressors  -UA benign, CXR without definitive infiltrate, concern for meningitis, need to do LP to r/o bacterial meningitis.  -will f/u neurology recs, treat for severe sepsis and presumed bacterial meningitis with vancomycin, ampicillin and cefepime with broad spectrum abx.  -trend lactic acid, bmp, mg, phos [id number].  -will consult vascular for lower extremity cellulitis/h/o MSSA which vascular was treating and obtain ESR/CRP.  -check echocardiogram and MRI head, f/u urine and blood cultures.  -admit to MICU for further monitoring.

## 2017-03-04 NOTE — STROKE CODE NOTE - NIH STROKE SCALE: 1A. LEVEL OF CONSCIOUSNESS, QM
(3) Responds only with reflex motor or autonomic effects or totally unresponsive, flaccid, and areflexic

## 2017-03-04 NOTE — CONSULT NOTE ADULT - ASSESSMENT
Patient is a 51y old  Male with PMH of HTN, HLD, hypothyroidism and DM2 who presents with a chief complaint of AMS.

## 2017-03-04 NOTE — ED PROVIDER NOTE - PHYSICAL EXAMINATION
CONSTITUTIONAL: altered, eyes deviating to right intermittently. GCS 7 E1V2M4  ENMT: Airway patent.  HEAD: Head is atraumatic. Head shape is symmetrical.  EYES: Clear bilaterally. Normal EOMI. 3mm PERRL  CARDIAC: Tachcyardic  Heart sounds S1, S2.   RESPIRATORY: Breath sounds clear and equal bilaterally.  GASTROINTESTINAL: Abdomen soft, non-tender, no guarding.  MUSCULOSKELETAL: Spine appears normal, range of motion is not limited, no muscle or joint tenderness.   NEUROLOGICAL: Alert and oriented, no focal deficits, no motor or sensory deficits.  SKIN:  numerous areas of scaling erythematous rash on torso and extremities with edematous erythematous patch on L shin.  PSYCHIATRIC: Alert and oriented to person, place, time/situation. normal mood and affect. no apparent risk to self or others. CONSTITUTIONAL: altered, eyes deviating to right intermittently. GCS 7 E1V2M4  ENMT: Airway patent.  HEAD: Head is atraumatic. Head shape is symmetrical.  EYES: Clear bilaterally. Normal EOMI. 3mm PERRL  CARDIAC: Tachcyardic  Heart sounds S1, S2.   RESPIRATORY: Breath sounds clear and equal bilaterally.  GASTROINTESTINAL: Abdomen soft, non-tender, no guarding.  MUSCULOSKELETAL: Spine appears normal, range of motion is not limited, no muscle or joint tenderness.   SKIN:  numerous areas of scaling erythematous rash on torso and extremities with edematous erythematous patch on L shin.  PSYCHIATRIC: Unable to eval.  Neuro: mostly R sided eye deviation and preference. Not withdrawing to pain in any upper or lower extremity. L sided upper contracted upper ext. Hyperreflexic lower extremities. GCS 7

## 2017-03-04 NOTE — CONSULT NOTE ADULT - SUBJECTIVE AND OBJECTIVE BOX
Stroke Code Consult Note: 50 yo M with Pmhx of uncontrolled DM, HTN presents to ED with EMS for slurred speech and weakness on left side. Per patient's fiance, he went for dinner last night and started experiencing weakness/slurred speech since 8 pm, but was able to ambulate. He came back home and slept around 9 pm. He was urinating and defecation(?incontinent can not tell) all night, while going to rest room. He was ambulating by himself thought ataxic(per wife). This am ~8 pm, pt. was not able to move and had ?shaking/tremors, at that time fiance got concered and called EMS. Per EMS pt. was lethargic but responding slowly to commands, and his GCS ~11, on arrival to ED hi GCS was 7 and he was intubated for airway protection. Stroke code was called after patient was intubated and sedated on propofol.  Per ED, pt. on arrival has left side weakness and hyperreflexia, but R.side babinski. His NIHSS was 33(probably high 2/2 sedation).       Last known well time/Time of onset of symptoms:8 Pm slurred speech started yesterday and 8 am today he had profound weakness and can not move his bod.        PAST MEDICAL & SURGICAL HISTORY:  DM (diabetes mellitus)  Hypothyroid  Hyperlipidemia  Hypertension  History of tonsillectomy  H/O vasectomy      FAMILY HISTORY:  Family history of lung cancer (Father, Mother)      Social History:    Review of Systems: unable to obtain as pt. intubated and sedated   MEDICATIONS  (STANDING):  propofol Infusion 5MICROgram(s)/kG/Min IV Continuous <Continuous>  sodium chloride 0.9% Bolus 1000milliLiter(s) IV Bolus once    MEDICATIONS  (PRN):      Allergies    No Known Allergies    Intolerances        Vital Signs Last 24 Hrs  T(C): 38.8, Max: 38.8 (03-04 @ 14:15)  T(F): 101.9, Max: 101.9 (03-04 @ 14:15)  HR: 106 (70 - 128)  BP: 169/103 (169/103 - 278/141)  BP(mean): 150 (150 - 200)  RR: 18 (17 - 20)  SpO2: 97% (97% - 100%)    Neurologic Exam:  Gen: intubated and sedated   CV: carotid arteries- no bruits, reg rate and rhythm, no murmurs  Neuro:  Mental status: intubated and sedated   Cranial nerves: sluggish reactive pupil, +VOR    Motor: unable to assess  Coordination: unable to assess  Reflexes: absent bilaterally in biceps, patellar, ankle area, negative babinski bilaterally  Gait: unable to assess   NIHSS: 33    Blood glucose (fingerstick): critical value, >500     Labs:                        17.8   19.2  )-----------( 304      ( 04 Mar 2017 13:49 )             48.2     04 Mar 2017 13:49    134    |  92     |  30     ----------------------------<  487    3.7     |  22     |  1.37     Ca    9.2        04 Mar 2017 13:49    TPro  8.1    /  Alb  3.6    /  TBili  1.4    /  DBili  x      /  AST  15     /  ALT  21     /  AlkPhos  68     04 Mar 2017 13:49    PT/INR - ( 04 Mar 2017 13:49 )   PT: 11.0 sec;   INR: 0.99          PTT - ( 04 Mar 2017 13:49 )  PTT:32.7 sec      Radiology and Additional Studies:1. Concern for an area of diminished attenuation within the left woody   respecting midline which may represent acute infarction. This is seen in   the setting of increased attenuation within the basilar artery concerning   for potential thrombus. CTA could be obtained for further   characterization of the basilar artery, and brain MR with   diffusion-weighted imaging may be helpful for further characterization of   the left woody.  2. 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  3. No CT evidence of acute hemorrhage.      IV-tPA (Y/N):  No                                 Bolus time:  Reason IV-tPA not given: Patient out of time window    Assessment & Plan: 50 yo M with Pmhx of uncontrolled DM, HTN presents to ED with EMS for slurred speech and weakness on left side. Pt intubated and sedated in ED for worsening GCS and airway protection. His CT head was no remarkable for acute hemorrhage, but did show concerning infarction in left pont. His CTA was unremarkable. His labs were significant for Blood glc>400 and elevated AG, concerning DKA. His Exam are concerning for possible neurological event with superimposed metabolic etiology - possible r/o DKA in setting of elevated AG and pt. with vomiting/urination.     1. No role of tPA given out of time window.   2. Permissive HTN upto 220/120  3. Further imaging when pt. stable with MRI  4. Echo, lipid panel, hba1c, tsh and carotid doppler  5. Will continue to follow. Stroke Code Consult Note: 52 yo M with Pmhx of uncontrolled DM, HTN presents to ED with EMS for slurred speech and weakness on left side. Per patient's fiance, he went for dinner last night and started experiencing weakness/slurred speech since 8 pm, but was able to ambulate. He came back home and slept around 9 pm. He was urinating and defecation(?incontinent can not tell) all night, while going to rest room. He was ambulating by himself thought ataxic(per wife). This am ~8 pm, pt. was not able to move and had ?shaking/tremors, at that time fiance got concered and called EMS. Per EMS pt. was lethargic but responding slowly to commands, and his GCS ~11, on arrival to ED hi GCS was 7 and he was intubated for airway protection. Stroke code was called after patient was intubated and sedated on propofol.  Per ED, pt. on arrival has left side weakness and hyperreflexia, but R.side babinski. His NIHSS was 33(probably high 2/2 sedation).       Last known well time/Time of onset of symptoms:8 Pm slurred speech started yesterday and 8 am today he had profound weakness and can not move his bod.    PAST MEDICAL & SURGICAL HISTORY:  DM (diabetes mellitus)  Hypothyroid  Hyperlipidemia  Hypertension  History of tonsillectomy  H/O vasectomy    FAMILY HISTORY:  Family history of lung cancer (Father, Mother)    Social History: unable to obtain    Review of Systems: unable to obtain as pt. intubated and sedated   MEDICATIONS  (STANDING):  propofol Infusion 5MICROgram(s)/kG/Min IV Continuous <Continuous>  sodium chloride 0.9% Bolus 1000milliLiter(s) IV Bolus once    Allergies    No Known Allergies    Vital Signs Last 24 Hrs  T(C): 38.8, Max: 38.8 (03-04 @ 14:15)  T(F): 101.9, Max: 101.9 (03-04 @ 14:15)  HR: 106 (70 - 128)  BP: 169/103 (169/103 - 278/141)  BP(mean): 150 (150 - 200)  RR: 18 (17 - 20)  SpO2: 97% (97% - 100%)    Neurologic Exam:  Gen: intubated and sedated   CV: carotid arteries- no bruits, reg rate and rhythm, no murmurs  Neuro:  Mental status: intubated and sedated   Cranial nerves: sluggish reactive pupil, +VOR    Motor: unable to assess  Coordination: unable to assess  Reflexes: absent bilaterally in biceps, patellar, ankle area, negative babinski bilaterally  Gait: unable to assess   NIHSS: 33    Blood glucose (fingerstick): critical value, >500     Labs:                        17.8   19.2  )-----------( 304      ( 04 Mar 2017 13:49 )             48.2     04 Mar 2017 13:49    134    |  92     |  30     ----------------------------<  487    3.7     |  22     |  1.37     Ca    9.2        04 Mar 2017 13:49    TPro  8.1    /  Alb  3.6    /  TBili  1.4    /  DBili  x      /  AST  15     /  ALT  21     /  AlkPhos  68     04 Mar 2017 13:49    PT/INR - ( 04 Mar 2017 13:49 )   PT: 11.0 sec;   INR: 0.99          PTT - ( 04 Mar 2017 13:49 )  PTT:32.7 sec      Radiology and Additional Studies:1. Concern for an area of diminished attenuation within the left woody   respecting midline which may represent acute infarction. This is seen in   the setting of increased attenuation within the basilar artery concerning   for potential thrombus. CTA could be obtained for further   characterization of the basilar artery, and brain MR with   diffusion-weighted imaging may be helpful for further characterization of   the left woody.  2. 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  3. No CT evidence of acute hemorrhage.      IV-tPA (Y/N):  No                                 Bolus time:  Reason IV-tPA not given: Patient out of time window    Assessment & Plan: 52 yo M with Pmhx of uncontrolled DM, HTN presents to ED with EMS for slurred speech and weakness on left side. Pt intubated and sedated in ED for worsening GCS and airway protection. His CT head was no remarkable for acute hemorrhage, but did show concerning infarction in left pont. His CTA was unremarkable. His labs were significant for Blood glc>400 and elevated AG, concerning DKA. His Exam are concerning for possible neurological event with superimposed metabolic etiology - possible r/o DKA in setting of elevated AG and pt. with vomiting/urination.     1. No role of tPA given out of time window.   2. Permissive HTN upto 220/120  3. Further imaging when pt. stable with MRI  4. Echo, lipid panel, hba1c, tsh and carotid doppler  5. Will continue to follow.

## 2017-03-04 NOTE — H&P ADULT - FAMILY HISTORY
Father  Still living? Unknown  Family history of lung cancer, Age at diagnosis: Age Unknown     Mother  Still living? Unknown  Family history of lung cancer, Age at diagnosis: Age Unknown

## 2017-03-04 NOTE — ED ADULT NURSE NOTE - OBJECTIVE STATEMENT
Pt w/ PMH of DM, hypothyroid, HTN BIBA found w/ AMS by kacie this morning.  Fiance reports along with EMS to ED and provides supplemental history.  Fiance provides that pt "wasn't feeling well last night" and took Advil prior to sleep.  Clinical upgrade by charge nurse.  Dr. Olivares and JL Guillen at bedside.  Pt is unresponsive to verbal stimulus upon arrival.  Pt responds to pain indiscriminately.  Pt is able to move right upper and lower extremity.  Pt has fixed gaze on left side.  Pt has patent airway, but shallow respirations with imminent potential for inability to protect airway.  Pt was intubated, connected to vent with settings of CMV 12; ; FiO2 100%; Peep 5.  Pt tolerating well.  EtCO2 at 35. FS en route was 430.  FS upon arrival in ED was critical hi.  EMS placed 20G to left hand with 1,000mL of NS wide-open prior to arrival.  No further treatments prior to arrival.  Pt placed on continuos cardiac monitoring and O2 monitoring upon arrival.  Stroke code initiated per [facility] protocol.  After RSI, propofol sedation initiated per protocol.

## 2017-03-04 NOTE — ED PROVIDER NOTE - MEDICAL DECISION MAKING DETAILS
Pt  brought in altered, GCS 7, intubated on arrival. Given exam findings of eye deviation, contracted upper ext, hx of slurred speech and L  sided weakness by kacie, code stroke called. Initial CT with no acute bleed ?woody infarct, followed by CTA. Discussed findings with stroke attending on call. Pt found to be in DKA also, treated w/ fluids insulin infusion. Delay in abx after an hour due to initial presentation very concerning for stroke and work up for such being completed. Pt noted to be febrile after return from CT, tylenol, fluids, empiric abx started. Pt intiially with no rigors but during ED course developing rigors/?seizure, given versed and fentanyl to assist. ICU consulted, will admit and obtain LP for possible meningitis given AMS, febrile and developing rigors. Pt  brought in altered, GCS 7, intubated on arrival and started on propofol to assist with BP also. Given exam findings of eye deviation, contracted upper ext, hx of slurred speech and L  sided weakness by kacie, code stroke called. Initial CT with no acute bleed ?woody infarct, followed by CTA. Discussed findings with stroke attending on call, will allow for permissive htn. Pt found to be in DKA also, treated w/ fluids insulin infusion. Given other metabolic abnormalities and pt outside window for stroke intervention and no acute findings on CTA, will continue to manage medically and pt will need repeat imaging  MRI/CT later during hospital stay.     Delay in abx after an hour due to initial presentation very concerning for stroke and work up for such being completed. Pt noted to be febrile after return from CT, tylenol, fluids, empiric abx started. Pt intiially with no rigors but during ED course developing rigors/?seizure, given versed and fentanyl to assist. ICU then consulted, will admit and obtain LP for possible meningitis given AMS, febrile and developing rigors. Down trending lactate, anion gap narrowing on repeat, pH improving on repeat labs. Pt  brought in altered, GCS 7, intubated on arrival and started on propofol to assist with BP also. Given exam findings of eye deviation, contracted upper ext, hx of slurred speech and L  sided weakness by kacie, code stroke called. Initial CT with no acute bleed ?woody infarct, followed by CTA. Discussed findings with stroke attending on call, will allow for permissive htn. Pt found to be in DKA also, given fluids, insulin infusion. Given other metabolic abnormalities and pt outside window for stroke intervention and no acute findings on CTA, will continue to manage medically and pt will need repeat imaging  MRI/CT later during hospital stay. Abx after an hour due to initial presentation very concerning for stroke and work up for such being completed. Pt noted to be febrile after return from CT, tylenol, fluids, empiric abx started. Pt initially with no rigors but during ED course developing rigors/?seizure, given versed and fentanyl to assist. ICU then consulted, will admit and obtain LP for possible meningitis given AMS, febrile and developing rigors. Down trending lactate, anion gap narrowing on repeat, pH improving on repeat labs.

## 2017-03-04 NOTE — ED PROVIDER NOTE - OBJECTIVE STATEMENT
50 yo brought in for AMS, per colee was seen to be slurring words at 8pm last night, progressively becoming more and more altered today, then brought to ED at approx 1pm. GCS 7 of pt and intubated on arrival, hx limited due to acuity and per EMS. Noted hypertensive and tachycardic on arrival. Cole mentions non compliane with insulin after. 50 yo brought in for AMS, per colee was seen to be slurring words at 8pm last night, progressively becoming more and more altered today, then brought to ED at approx 1pm. GCS 7 of pt and intubated on arrival, hx limited due to acuity and per EMS. Noted hypertensive and tachycardic on arrival. Cole mentions non compliance with insulin.

## 2017-03-04 NOTE — H&P ADULT - HISTORY OF PRESENT ILLNESS
52 yo M with  uncontrolled DM, HTN, hypothyroidism was BIBEMS for slurred speech and weakness on left side. SOurce of HPI is pt's fiance and ED documentation as pt is currently intubated and sedated. As per pt's fiance he was in his usual state of health until yesterday 8PM, when she noted he was weak and had slurred speech. She states when he was walking he was wobbly. This AM when she woke up she noted he was shaking/rigoring so she called EMS. Upon arrival of ambulance he was noted to be lethargic, but responding to commands slowly - at the time his GCS was deemed to be 11. Upon arrival to ED his GCS was ntoed to be 7, pt was obtunded so he was intubated for airway protection.   PMH: DM, HTN, hypothyroidism  Allergies:  PSH:  Meds: 50 yo M with  uncontrolled DM, HTN, hypothyroidism was BIBEMS for slurred speech and weakness on left side. SOurce of HPI is pt's fiance and ED documentation as pt is currently intubated and sedated. As per pt's fiance he was in his usual state of health until yesterday 8PM, when she noted he was weak and had slurred speech. She states when he was walking he was wobbly. This AM when she woke up she noted he was shaking/rigoring so she called EMS. Upon arrival of ambulance he was noted to be lethargic, but responding to commands slowly - at the time his GCS was deemed to be 11. Upon arrival to ED his GCS was ntoed to be 7, pt was obtunded so he was intubated for airway protection. Stroke code was called, pt went for CT head which showed new infarct in woody, CTA head&neck however did not show any stenosis.  PMH: DM, HTN, hypothyroidism  Allergies:  PSH:  Meds:    In ED pt was febrile to 102,. tachycardic to 130, hypertensive to 250/180.  He was given Vanc, Zosyn, started on an insulin gtt and given 3L NS. 50 yo M with  uncontrolled DM, HTN, hypothyroidism was BIBEMS for slurred speech and weakness on left side. Source of HPI is pt's fiance and ED documentation as pt is currently intubated and sedated. As per pt's fiance he was in his usual state of health until yesterday 8PM, when she noted he was weak and had slurred speech. She states when he was walking he was wobbly. This AM when she woke up she noted he was shaking/rigoring so she called EMS. Upon arrival of ambulance he was noted to be lethargic, but responding to commands slowly - at the time his GCS was deemed to be 11. Upon arrival to ED his GCS was ntoed to be 7, pt was obtunded so he was intubated for airway protection. Stroke code was called, pt went for CT head which showed new infarct in woody, CTA head&neck however did not show any stenosis.  Upon chart review noted, the pt was admitted for cellulitis in [facility], had I&D by vascular surgery, was supposed to fu with  as outpatient, but wasn't able to make appointment.   PMH: DM, HTN, hypothyroidism    In ED pt was febrile to 102,. tachycardic to 130, hypertensive to 250/180.  He was given Vanc, Zosyn, started on an insulin gtt and given 3L NS. 52 yo M with  uncontrolled DM, HTN, hypothyroidism was BIBEMS for slurred speech and weakness on left side. Source of HPI is pt's fiance and ED documentation as pt is currently intubated and sedated. As per pt's fiance he was in his usual state of health until yesterday 8PM, when she noted he was weak and had slurred speech. They went out to dinner and she noted he was very slow (which is unsusual for him). At dinner she noted he has a left facial droop, which he said is all 2/2 his BP meds. Then she noted him overnight to be nauseous, he started vomiting, he was incontinent to urine and feces.  She states when he was walking he was wobbly, but later towards the morning he wasn't even able to get out of bed. This AM when she woke up she noted he was shaking/rigoring so she called EMS. Upon arrival of ambulance he was noted to be lethargic, but responding to commands slowly - at the time his GCS was deemed to be 11. Upon arrival to ED his GCS was ntoed to be 7, pt was obtunded so he was intubated for airway protection. Stroke code was called, pt went for CT head which showed new infarct in woody, CTA head&neck however did not show any stenosis.  Upon chart review noted, the pt was admitted for cellulitis in [facility], had I&D by vascular surgery, was supposed to fu with  as outpatient, but wasn't able to make appointment.   PMH: DM, HTN, hypothyroidism    In ED pt was febrile to 102,. tachycardic to 130, hypertensive to 250/180.  He was given Vanc, Zosyn, started on an insulin gtt and given 3L NS.

## 2017-03-04 NOTE — CONSULT NOTE ADULT - ASSESSMENT
51yoM s/p Lt anterior shin wound debridement and vac placement 10/28 post op to plan for skin graft but patient never followed up now wound healed over  -wound care consult  -local wound care  -d/w chief resident on call

## 2017-03-04 NOTE — H&P ADULT - PROBLEM SELECTOR PLAN 4
Meeting sepsis criteria on admission with fever, tachycardia, Meeting sepsis criteria on admission with fever, tachycardia, likely source bacteremia 2/2 cellulitis vs OM of LE due to history recent hospitalization in 11/2016 for cellulitis, found to have MSSA.  - as problem 1 ATB coverage  - fu UCx  -fu BCx

## 2017-03-04 NOTE — H&P ADULT - PROBLEM SELECTOR PLAN 5
present on admission with AG 20 and elevated BHB. H/o uncontrolled DM with A1c on admission 12. Ketones in urine.  -cont insulin gtt  -no need  for bicarb gtt as pH >7.16  - 3x10K runs + 80K po, aggressive K repelteion as K<4.5 and pt on insulin gtt  -repelte P and Mag as well  -follow DKA protocol present on admission with AG 20 and elevated BHB. H/o uncontrolled DM with A1c on admission 12. Ketones in urine.  -cont insulin gtt  -no need  for bicarb gtt as pH >7.16  - 3x10K runs + 80K po, aggressive K repelteion as K<4.5 and pt on insulin gtt while gap still closing  -repelte P and Mag as well  -follow DKA protocol

## 2017-03-04 NOTE — H&P ADULT - NSHPPHYSICALEXAM_GEN_ALL_CORE
Constitutional: NAD, well-groomed, well-developed  HEENT: PERRLA, EOMI, Normal Hearing, MMM  Neck: No LAD, No JVD  Back: Normal spine flexure, No CVA tenderness  Respiratory: CTAB  Cardiovascular: S1 and S2, RRR, no M/G/R  Gastrointestinal: BS+, soft, NT/ND  Extremities: No peripheral edema  Vascular: 2+ peripheral pulses  Neurological: A/O x 3, no focal deficits  Psychiatric: Normal mood, normal affect  Musculoskeletal: 5/5 strength b/l upper and lower extremities  Skin: No rashes At the time of my exam pt is intubated and sedated  Constitutional: NAD  HEENT: PERRLA, EOMI, Normal Hearing, MMM  Neck: No LAD, No JVD  Back: Normal spine flexure, No CVA tenderness  Respiratory: CTAB  Cardiovascular: S1 and S2, RRR, no M/G/R  Gastrointestinal: BS+, soft, NT/ND  Extremities: No peripheral edema  Vascular: 2+ peripheral pulses  Neurological: intubated and sedated  Psychiatric: Normal mood, normal affect  Skin: 2x1 cm rashes erythematous scaly over torso, extensor surfaces of knees; 3x4 cm ulcer noted on left anterior shin, scab 1x1 cm on Rt anterior shin

## 2017-03-04 NOTE — H&P ADULT - ASSESSMENT
52 yo M with  uncontrolled DM, HTN, hypothyroidism was BIBEMS for slurred speech and weakness on left side admitted to MICU after being intubated in ED for airway protection for AMS r/o bacterial meningitis.

## 2017-03-04 NOTE — ED ADULT NURSE REASSESSMENT NOTE - NS ED NURSE REASSESS COMMENT FT1
Patient remained sedated with stable vital signs at this time , noted with some shaking episode in whole body , evaluated by Dr. Olivares , versed given as ordered. Noted with coffee ground output from the NGT , Protonix ivp given as ordered . Being evaluated by ICU residents at this time . Will continue to monitor .

## 2017-03-04 NOTE — H&P ADULT - NSHPREVIEWOFSYSTEMS_GEN_ALL_CORE
REVIEW OF SYSTEMS:    CONSTITUTIONAL: No fever, weight loss, or fatigue  EYES: No eye pain, visual disturbances, or discharge  ENMT:  No difficulty hearing, tinnitus, vertigo; No sinus or throat pain  NECK: No pain or stiffness  BREASTS: No pain, masses, or nipple discharge  RESPIRATORY: No cough, wheezing, chills or hemoptysis; No shortness of breath  CARDIOVASCULAR: No chest pain, palpitations, dizziness, or leg swelling  GASTROINTESTINAL: No abdominal or epigastric pain. No nausea, vomiting, or hematemesis; No diarrhea or constipation. No melena or hematochezia.  GENITOURINARY: No dysuria, frequency, hematuria, or incontinence  NEUROLOGICAL: No headaches, memory loss, loss of strength, numbness, or tremors  SKIN: No itching, burning, rashes, or lesions   LYMPH NODES: No enlarged glands  ENDOCRINE: No heat or cold intolerance; No hair loss  MUSCULOSKELETAL: No joint pain or swelling; No muscle, back, or extremity pain  PSYCHIATRIC: No depression, anxiety, mood swings, or difficulty sleeping  HEME/LYMPH: No easy bruising, or bleeding gums  ALLERY AND IMMUNOLOGIC: No hives or eczema CONSTITUTIONAL: No fever, weight loss, or fatigue  EYES: No eye pain, visual disturbances, or discharge  ENMT:  No difficulty hearing, tinnitus, vertigo; No sinus or throat pain  NECK: No pain or stiffness  BREASTS: No pain, masses, or nipple discharge  RESPIRATORY: No cough, wheezing, chills or hemoptysis; No shortness of breath  CARDIOVASCULAR: No chest pain, palpitations, dizziness, or leg swelling  GASTROINTESTINAL: No abdominal or epigastric pain. No nausea, vomiting, or hematemesis; No diarrhea or constipation. No melena or hematochezia.  GENITOURINARY: No dysuria, frequency, hematuria, or incontinence  NEUROLOGICAL: No headaches, memory loss, loss of strength, numbness, or tremors  SKIN: No itching, burning, rashes, or lesions   LYMPH NODES: No enlarged glands  ENDOCRINE: No heat or cold intolerance; No hair loss  MUSCULOSKELETAL: No joint pain or swelling; No muscle, back, or extremity pain  PSYCHIATRIC: No depression, anxiety, mood swings, or difficulty sleeping  HEME/LYMPH: No easy bruising, or bleeding gums  ALLERY AND IMMUNOLOGIC: No hives or eczema cannot assess   see HPI

## 2017-03-04 NOTE — H&P ADULT - PROBLEM SELECTOR PLAN 3
NIHSS 33 on admission, but likely unreliable as pt was already intubated and sedated when done. CT head suspicious for attenuation in left woody, CTA head&neck negative.  -will likely require MRI brain for further determination of changes

## 2017-03-04 NOTE — PROCEDURE NOTE - NSPROCDETAILS_GEN_ALL_CORE
CSF Obtained/location identified, draped/prepped, sterile technique used, needle inserted/introduced/area cleaned in sterile fashion

## 2017-03-05 LAB
ALBUMIN SERPL ELPH-MCNC: 2.7 G/DL — LOW (ref 3.4–5)
ALBUMIN SERPL ELPH-MCNC: 2.7 G/DL — LOW (ref 3.4–5)
ALP SERPL-CCNC: 38 U/L — LOW (ref 40–120)
ALP SERPL-CCNC: 42 U/L — SIGNIFICANT CHANGE UP (ref 40–120)
ALT FLD-CCNC: 15 U/L — SIGNIFICANT CHANGE UP (ref 12–42)
ALT FLD-CCNC: 15 U/L — SIGNIFICANT CHANGE UP (ref 12–42)
ANION GAP SERPL CALC-SCNC: 11 MMOL/L — SIGNIFICANT CHANGE UP (ref 9–16)
ANION GAP SERPL CALC-SCNC: 9 MMOL/L — SIGNIFICANT CHANGE UP (ref 9–16)
AST SERPL-CCNC: 13 U/L — LOW (ref 15–37)
AST SERPL-CCNC: 14 U/L — LOW (ref 15–37)
BILIRUB SERPL-MCNC: 0.5 MG/DL — SIGNIFICANT CHANGE UP (ref 0.2–1.2)
BILIRUB SERPL-MCNC: 0.7 MG/DL — SIGNIFICANT CHANGE UP (ref 0.2–1.2)
BUN SERPL-MCNC: 24 MG/DL — HIGH (ref 7–23)
BUN SERPL-MCNC: 26 MG/DL — HIGH (ref 7–23)
CALCIUM SERPL-MCNC: 7.6 MG/DL — LOW (ref 8.5–10.5)
CALCIUM SERPL-MCNC: 7.7 MG/DL — LOW (ref 8.5–10.5)
CHLORIDE SERPL-SCNC: 106 MMOL/L — SIGNIFICANT CHANGE UP (ref 96–108)
CHLORIDE SERPL-SCNC: 109 MMOL/L — HIGH (ref 96–108)
CO2 SERPL-SCNC: 22 MMOL/L — SIGNIFICANT CHANGE UP (ref 22–31)
CO2 SERPL-SCNC: 24 MMOL/L — SIGNIFICANT CHANGE UP (ref 22–31)
CREAT SERPL-MCNC: 1.32 MG/DL — HIGH (ref 0.5–1.3)
CREAT SERPL-MCNC: 1.49 MG/DL — HIGH (ref 0.5–1.3)
CULTURE RESULTS: NO GROWTH — SIGNIFICANT CHANGE UP
GAS PNL BLDV: SIGNIFICANT CHANGE UP
GLUCOSE SERPL-MCNC: 142 MG/DL — HIGH (ref 70–99)
GLUCOSE SERPL-MCNC: 185 MG/DL — HIGH (ref 70–99)
HCT VFR BLD CALC: 38.7 % — LOW (ref 39–50)
HGB BLD-MCNC: 13.5 G/DL — SIGNIFICANT CHANGE UP (ref 13–17)
LDH CSF L TO P-CCNC: 19 U/L — SIGNIFICANT CHANGE UP
LDH FLD-CCNC: 19 U/L — SIGNIFICANT CHANGE UP
MAGNESIUM SERPL-MCNC: 1.9 MG/DL — SIGNIFICANT CHANGE UP (ref 1.6–2.4)
MCHC RBC-ENTMCNC: 28.4 PG — SIGNIFICANT CHANGE UP (ref 27–34)
MCHC RBC-ENTMCNC: 34.9 G/DL — SIGNIFICANT CHANGE UP (ref 32–36)
MCV RBC AUTO: 81.5 FL — SIGNIFICANT CHANGE UP (ref 80–100)
PHOSPHATE SERPL-MCNC: 2.5 MG/DL — SIGNIFICANT CHANGE UP (ref 2.5–4.5)
PHOSPHATE SERPL-MCNC: 2.5 MG/DL — SIGNIFICANT CHANGE UP (ref 2.5–4.5)
PLATELET # BLD AUTO: 261 K/UL — SIGNIFICANT CHANGE UP (ref 150–400)
POTASSIUM SERPL-MCNC: 3.6 MMOL/L — SIGNIFICANT CHANGE UP (ref 3.5–5.3)
POTASSIUM SERPL-MCNC: 3.9 MMOL/L — SIGNIFICANT CHANGE UP (ref 3.5–5.3)
POTASSIUM SERPL-SCNC: 3.6 MMOL/L — SIGNIFICANT CHANGE UP (ref 3.5–5.3)
POTASSIUM SERPL-SCNC: 3.9 MMOL/L — SIGNIFICANT CHANGE UP (ref 3.5–5.3)
PROT SERPL-MCNC: 6.1 G/DL — LOW (ref 6.4–8.2)
PROT SERPL-MCNC: 6.2 G/DL — LOW (ref 6.4–8.2)
RAPID RVP RESULT: SIGNIFICANT CHANGE UP
RBC # BLD: 4.75 M/UL — SIGNIFICANT CHANGE UP (ref 4.2–5.8)
RBC # FLD: 12.5 % — SIGNIFICANT CHANGE UP (ref 10.3–16.9)
SODIUM SERPL-SCNC: 139 MMOL/L — SIGNIFICANT CHANGE UP (ref 135–145)
SODIUM SERPL-SCNC: 142 MMOL/L — SIGNIFICANT CHANGE UP (ref 135–145)
SPECIMEN SOURCE: SIGNIFICANT CHANGE UP
WBC # BLD: 18 K/UL — HIGH (ref 3.8–10.5)
WBC # FLD AUTO: 18 K/UL — HIGH (ref 3.8–10.5)

## 2017-03-05 PROCEDURE — 99253 IP/OBS CNSLTJ NEW/EST LOW 45: CPT

## 2017-03-05 PROCEDURE — 99222 1ST HOSP IP/OBS MODERATE 55: CPT

## 2017-03-05 PROCEDURE — 99233 SBSQ HOSP IP/OBS HIGH 50: CPT | Mod: GC

## 2017-03-05 PROCEDURE — 70551 MRI BRAIN STEM W/O DYE: CPT | Mod: 26

## 2017-03-05 PROCEDURE — 71010: CPT | Mod: 26

## 2017-03-05 RX ORDER — LEVETIRACETAM 250 MG/1
1000 TABLET, FILM COATED ORAL ONCE
Qty: 0 | Refills: 0 | Status: COMPLETED | OUTPATIENT
Start: 2017-03-05 | End: 2017-03-05

## 2017-03-05 RX ORDER — SODIUM CHLORIDE 9 MG/ML
1000 INJECTION, SOLUTION INTRAVENOUS
Qty: 0 | Refills: 0 | Status: DISCONTINUED | OUTPATIENT
Start: 2017-03-05 | End: 2017-03-05

## 2017-03-05 RX ORDER — INSULIN LISPRO 100/ML
VIAL (ML) SUBCUTANEOUS EVERY 4 HOURS
Qty: 0 | Refills: 0 | Status: DISCONTINUED | OUTPATIENT
Start: 2017-03-05 | End: 2017-03-12

## 2017-03-05 RX ORDER — SODIUM CHLORIDE 9 MG/ML
1000 INJECTION INTRAMUSCULAR; INTRAVENOUS; SUBCUTANEOUS
Qty: 0 | Refills: 0 | Status: DISCONTINUED | OUTPATIENT
Start: 2017-03-05 | End: 2017-03-06

## 2017-03-05 RX ORDER — LEVETIRACETAM 250 MG/1
500 TABLET, FILM COATED ORAL EVERY 12 HOURS
Qty: 0 | Refills: 0 | Status: DISCONTINUED | OUTPATIENT
Start: 2017-03-06 | End: 2017-03-08

## 2017-03-05 RX ORDER — POTASSIUM CHLORIDE 20 MEQ
40 PACKET (EA) ORAL ONCE
Qty: 0 | Refills: 0 | Status: COMPLETED | OUTPATIENT
Start: 2017-03-05 | End: 2017-03-05

## 2017-03-05 RX ORDER — INSULIN LISPRO 100/ML
VIAL (ML) SUBCUTANEOUS
Qty: 0 | Refills: 0 | Status: DISCONTINUED | OUTPATIENT
Start: 2017-03-05 | End: 2017-03-05

## 2017-03-05 RX ORDER — AMLODIPINE BESYLATE 2.5 MG/1
10 TABLET ORAL DAILY
Qty: 0 | Refills: 0 | Status: DISCONTINUED | OUTPATIENT
Start: 2017-03-05 | End: 2017-03-12

## 2017-03-05 RX ORDER — MAGNESIUM SULFATE 500 MG/ML
1 VIAL (ML) INJECTION ONCE
Qty: 0 | Refills: 0 | Status: COMPLETED | OUTPATIENT
Start: 2017-03-05 | End: 2017-03-05

## 2017-03-05 RX ADMIN — Medication 300 MILLIGRAM(S): at 04:11

## 2017-03-05 RX ADMIN — Medication 216 GRAM(S): at 10:32

## 2017-03-05 RX ADMIN — Medication 216 GRAM(S): at 16:31

## 2017-03-05 RX ADMIN — SODIUM CHLORIDE 150 MILLILITER(S): 9 INJECTION, SOLUTION INTRAVENOUS at 00:02

## 2017-03-05 RX ADMIN — CEFEPIME 100 MILLIGRAM(S): 1 INJECTION, POWDER, FOR SOLUTION INTRAMUSCULAR; INTRAVENOUS at 16:07

## 2017-03-05 RX ADMIN — SODIUM CHLORIDE 150 MILLILITER(S): 9 INJECTION, SOLUTION INTRAVENOUS at 06:32

## 2017-03-05 RX ADMIN — Medication 40 MILLIEQUIVALENT(S): at 00:53

## 2017-03-05 RX ADMIN — Medication 50 MICROGRAM(S): at 06:02

## 2017-03-05 RX ADMIN — HEPARIN SODIUM 5000 UNIT(S): 5000 INJECTION INTRAVENOUS; SUBCUTANEOUS at 23:14

## 2017-03-05 RX ADMIN — Medication 2: at 23:14

## 2017-03-05 RX ADMIN — LEVETIRACETAM 400 MILLIGRAM(S): 250 TABLET, FILM COATED ORAL at 20:00

## 2017-03-05 RX ADMIN — Medication 216 GRAM(S): at 07:09

## 2017-03-05 RX ADMIN — AMLODIPINE BESYLATE 10 MILLIGRAM(S): 2.5 TABLET ORAL at 11:31

## 2017-03-05 RX ADMIN — Medication 269 MILLIGRAM(S): at 22:00

## 2017-03-05 RX ADMIN — Medication 40 MILLIEQUIVALENT(S): at 07:55

## 2017-03-05 RX ADMIN — Medication 650 MILLIGRAM(S): at 04:11

## 2017-03-05 RX ADMIN — Medication 300 MILLIGRAM(S): at 17:10

## 2017-03-05 RX ADMIN — HEPARIN SODIUM 5000 UNIT(S): 5000 INJECTION INTRAVENOUS; SUBCUTANEOUS at 16:07

## 2017-03-05 RX ADMIN — PANTOPRAZOLE SODIUM 40 MILLIGRAM(S): 20 TABLET, DELAYED RELEASE ORAL at 11:31

## 2017-03-05 RX ADMIN — Medication 100 GRAM(S): at 07:09

## 2017-03-05 RX ADMIN — Medication 2: at 04:11

## 2017-03-05 RX ADMIN — Medication: at 16:00

## 2017-03-05 RX ADMIN — Medication 216 GRAM(S): at 18:45

## 2017-03-05 RX ADMIN — Medication 216 GRAM(S): at 03:20

## 2017-03-05 RX ADMIN — PROPOFOL 2.73 MICROGRAM(S)/KG/MIN: 10 INJECTION, EMULSION INTRAVENOUS at 00:13

## 2017-03-05 RX ADMIN — CHLORHEXIDINE GLUCONATE 15 MILLILITER(S): 213 SOLUTION TOPICAL at 18:45

## 2017-03-05 RX ADMIN — Medication 650 MILLIGRAM(S): at 11:34

## 2017-03-05 RX ADMIN — Medication 269 MILLIGRAM(S): at 16:07

## 2017-03-05 RX ADMIN — Medication 269 MILLIGRAM(S): at 08:00

## 2017-03-05 RX ADMIN — SODIUM CHLORIDE 100 MILLILITER(S): 9 INJECTION INTRAMUSCULAR; INTRAVENOUS; SUBCUTANEOUS at 22:45

## 2017-03-05 RX ADMIN — CHLORHEXIDINE GLUCONATE 15 MILLILITER(S): 213 SOLUTION TOPICAL at 06:02

## 2017-03-05 RX ADMIN — CEFEPIME 100 MILLIGRAM(S): 1 INJECTION, POWDER, FOR SOLUTION INTRAMUSCULAR; INTRAVENOUS at 23:13

## 2017-03-05 RX ADMIN — Medication 216 GRAM(S): at 23:30

## 2017-03-05 RX ADMIN — PROPOFOL 2.73 MICROGRAM(S)/KG/MIN: 10 INJECTION, EMULSION INTRAVENOUS at 05:48

## 2017-03-05 RX ADMIN — CEFEPIME 100 MILLIGRAM(S): 1 INJECTION, POWDER, FOR SOLUTION INTRAMUSCULAR; INTRAVENOUS at 09:29

## 2017-03-05 RX ADMIN — PROPOFOL 2.73 MICROGRAM(S)/KG/MIN: 10 INJECTION, EMULSION INTRAVENOUS at 09:23

## 2017-03-05 RX ADMIN — HEPARIN SODIUM 5000 UNIT(S): 5000 INJECTION INTRAVENOUS; SUBCUTANEOUS at 06:02

## 2017-03-05 NOTE — PROGRESS NOTE ADULT - SUBJECTIVE AND OBJECTIVE BOX
NEUROLOGY CONSULT: AMS, Fever- r/o encephalitis/ meningitis      HPI: 52 yo M presented to ED with slurred speech and weakness on left side. Initial hx largely obtained from pt's fiance, ED records, and H+P as pt is intubated and sedated at present. Pt reportedly in his usual state of health until two evenings prior, when she noted he was weak and had slurred speech. They went out to dinner and she noted he was very slow and had a left facial droop. Then she noted him overnight to be nauseous with vomiting, he was incontinent of urine/stool.  She states when he was walking he was wobbly, but later towards the morning he wasn't even able to get out of bed. Yesterday AM when she woke up she noted he was shaking so she called EMS. Upon arrival of ambulance he was noted to be lethargic, but responding to commands slowly - at the time his GCS was deemed to be 11. Upon arrival to ED his GCS was noted to be 7, pt was obtunded so he was intubated for airway protection. Stroke code was called, pt went for CT head which showed a possible new infarct in L woody, CTA head&neck however did not show any stenosis. Per Dr. Rose, was notified during stroke code that patient had R gaze deviation and L hemiparesis.    In ED pt was febrile to 102,. tachycardic to 130, hypertensive to 250/180.  He was given Vanc, Zosyn, started on an insulin gtt and given 3L NS (serum glucose: 487)    PMHX: DM, HTN, hypothyroidism  PSXH:  vasectomy, tonsillectomy  FAMHX: unobtainable 2/2 intubation  SOCHX: unobtainable 2/2 intubation  ALL: NKDA    ROS: unobtainable 2/2 intubation/sedation      VITAL SIGNS:  Vital Signs Last 24 Hrs  T(C): 38.6, Max: 39.1 (03-04 @ 19:45)  T(F): 101.4, Max: 102.3 (03-04 @ 19:45)  HR: 99 (68 - 110)  BP: 176/73 (135/79 - 210/94)  BP(mean): 111 (103 - 137)  RR: 20 (9 - 21)  SpO2: 99% (97% - 100%)    PHYSICAL EXAMINATION:  Neuro:    MS: Pt intubated, sedated on propofol, follows some commands ( with R hand)  CN: PERRL, resists eye opening for BTT testing (turns head away from eye being examined b/l) ,+gag  Motor- R hand  intact, otherwise no spontaneous movements  Sensory: withdraws to noxious stim in LLE only  Reflexes: 2+ RUE, 1+ LUE, hyperreflexic KJ b/l, 2+ R AJ, +clonus 2-3 beats L AJ; plantar withdrawal on R, mute on L  Coordination: unable to follow commands for testing.     MEDS:  MEDICATIONS  (STANDING):  propofol Infusion 5MICROgram(s)/kG/Min IV Continuous <Continuous>  heparin  Injectable 5000Unit(s) SubCutaneous every 8 hours  chlorhexidine 0.12% Liquid 15milliLiter(s) Swish and Spit two times a day  pantoprazole  Injectable 40milliGRAM(s) IV Push daily  ampicillin  IVPB 2Gram(s) IV Intermittent every 4 hours  cefepime  IVPB 2000milliGRAM(s) IV Intermittent every 8 hours  vancomycin  IVPB 1500milliGRAM(s) IV Intermittent every 12 hours  levothyroxine Injectable 50MICROGram(s) IV Push daily  acyclovir IVPB 950milliGRAM(s) IV Intermittent every 8 hours  acyclovir IVPB  IV Intermittent   lactated ringers. 1000milliLiter(s) IV Continuous <Continuous>  insulin lispro (HumaLOG) corrective regimen sliding scale  SubCutaneous every 4 hours  amLODIPine   Tablet 10milliGRAM(s) Oral daily    MEDICATIONS  (PRN):  acetaminophen    Suspension 650milliGRAM(s) Oral every 6 hours PRN For Temp greater than 38 C (100.4 F)      LABS:                          13.5   18.0  )-----------( 261      ( 05 Mar 2017 05:54 )             38.7     05 Mar 2017 05:53    142    |  109    |  24     ----------------------------<  142    3.6     |  22     |  1.32     Ca    7.7        05 Mar 2017 05:53  Phos  2.5       05 Mar 2017 05:53  Mg     1.9       05 Mar 2017 05:53    TPro  6.1    /  Alb  2.7    /  TBili  0.7    /  DBili  x      /  AST  14     /  ALT  15     /  AlkPhos  38     05 Mar 2017 05:53    LIVER FUNCTIONS - ( 05 Mar 2017 05:53 )  Alb: 2.7 g/dL / Pro: 6.1 g/dL / ALK PHOS: 38 U/L / ALT: 15 U/L / AST: 14 U/L / GGT: x             IMPRESSION & PLAN:    52 yo M with  uncontrolled DM, HTN, hypothyroidism was BIBEMS for slurred speech and weakness on left side (? R gaze deviation) admitted to MICU after being intubated in ED for airway protection for AMS. Presently with Fevers- on empiric broad spectrum coverage for meningitis. CSF studies unrevealing, few nucleated cells with glucose elevation (also significantly elevated in serum, CSF/serum ratio >0.6 suggests less likely fungal etiology). Protein in CSF is elevated however, which can be seen with viral or immune-mediated encephalitis.     Focal neurologic deficits on presentation concerning for structural etiology such as stroke vs. seizure with post-ictal tiana's paralysis    1) Agree with broad spectrum coverage including acyclovir- pending HSV testing  2) Would consider ID consultation to r/o other infectious sources as pt is diabetic and immunocompromised  3) MRI brain w/o contrast (due to renal insufficiency) today  4) VEEG after MRI  5) Consider w/u for inflammatory/autoimmune etiologies pending MRI/EEG results    Dr. Marin to follow patient from general neurology on Monday.    Above d/w MICU resident.

## 2017-03-05 NOTE — CONSULT NOTE ADULT - SUBJECTIVE AND OBJECTIVE BOX
HISTORY OF PRESENT ILLNESS:   52 yo M presented to ED with yesterday with slurred speech and weakness on left side. History obtained from EMR:     Pt reportedly in his usual state of health until two evenings prior, when she noted he was weak and had slurred speech. They went out to dinner and she noted he was very slow and had a left facial droop. Then she noted him overnight to be nauseous with vomiting, he was incontinent of urine/stool.  She states when he was walking he was wobbly, but later towards the morning he wasn't even able to get out of bed. Yesterday AM when she woke up she noted he was shaking so she called EMS. Upon arrival of ambulance he was noted to be lethargic, but responding to commands slowly - at the time his GCS was deemed to be 11. Upon arrival to ED his GCS was noted to be 7, pt was obtunded so he was intubated for airway protection. Stroke code was called, pt went for CT head which showed a possible new infarct in L woody, CTA head&neck however did not show any stenosis. Per Dr. Rose, was notified during stroke code that patient had R gaze deviation and L hemiparesis.    Patient was febrile in ED to 102, with SIRS signs and started on broad spectrum abx, aggressively fluid resuscitated and started on insuline drip.   Neurosurgery consulted for opening pressures on LP measured at 38.     PAST MEDICAL & SURGICAL HISTORY:  DM (diabetes mellitus)  Hypothyroid  Hyperlipidemia  Hypertension  History of tonsillectomy  H/O vasectomy    FAMILY HISTORY:  Family history of lung cancer (Father, Mother)      SOCIAL HISTORY: unable to obtain  Tobacco Use:  EtOH use:   Substance:    Allergies    No Known Allergies    Intolerances        REVIEW OF SYSTEMS: unable to obtain 2/2 intubated/sedation    MEDICATIONS:  Antibiotics:  ampicillin  IVPB 2Gram(s) IV Intermittent every 4 hours  cefepime  IVPB 2000milliGRAM(s) IV Intermittent every 8 hours  vancomycin  IVPB 1500milliGRAM(s) IV Intermittent every 12 hours  acyclovir IVPB 950milliGRAM(s) IV Intermittent every 8 hours  acyclovir IVPB  IV Intermittent     Neuro:  propofol Infusion 5MICROgram(s)/kG/Min IV Continuous <Continuous>  acetaminophen    Suspension 650milliGRAM(s) Oral every 6 hours PRN    Anticoagulation:  heparin  Injectable 5000Unit(s) SubCutaneous every 8 hours    OTHER:  chlorhexidine 0.12% Liquid 15milliLiter(s) Swish and Spit two times a day  pantoprazole  Injectable 40milliGRAM(s) IV Push daily  levothyroxine Injectable 50MICROGram(s) IV Push daily  insulin lispro (HumaLOG) corrective regimen sliding scale  SubCutaneous every 4 hours  amLODIPine   Tablet 10milliGRAM(s) Oral daily    IVF:  lactated ringers. 1000milliLiter(s) IV Continuous <Continuous>      Vital Signs Last 24 Hrs  T(C): 37.8, Max: 39.1 ( @ 19:45)  T(F): 100.1, Max: 102.3 ( @ 19:45)  HR: 96 (68 - 106)  BP: 172/83 (135/79 - 182/92)  BP(mean): 121 (103 - 137)  RR: 10 (7 - 26)  SpO2: 99% (97% - 100%)    PHYSICAL EXAM:  Constitutional: intubated exam obtained on propofol  Eyes: +ve corneals, open to stimulus, rt gaze  ENMT: +ve gag  Respiratory: intubated on CPAP  Cardiovascular: RRR  Gastrointestinal: soft NTND  Genitourinary: connell in place  Vascular: distal pulses intact, LLE anterior shin cellulitis wound dressed  Neurological: eyes open to stimulus, pupils equal sluggish reactive b/l ~4mm, moving left side spont, reaching for ETT, not following commands, no Rt movement appreciated    LABS:                        13.5   18.0  )-----------( 261      ( 05 Mar 2017 05:54 )             38.7     05 Mar 2017 05:53    142    |  109    |  24     ----------------------------<  142    3.6     |  22     |  1.32     Ca    7.7        05 Mar 2017 05:53  Phos  2.5       05 Mar 2017 05:53  Mg     1.9       05 Mar 2017 05:53    TPro  6.1    /  Alb  2.7    /  TBili  0.7    /  DBili  x      /  AST  14     /  ALT  15     /  AlkPhos  38     05 Mar 2017 05:53    PT/INR - ( 04 Mar 2017 13:49 )   PT: 11.0 sec;   INR: 0.99          PTT - ( 04 Mar 2017 13:49 )  PTT:32.7 sec  Urinalysis Basic - ( 04 Mar 2017 15:05 )    Color: Yellow / Appearance: Clear / S.020 / pH: x  Gluc: x / Ketone: 40 mg/dL  / Bili: NEGATIVE / Urobili: 0.2 E.U./dL   Blood: x / Protein: >=300 mg/dL / Nitrite: NEGATIVE   Leuk Esterase: NEGATIVE / RBC: < 5 /HPF / WBC < 5 /HPF   Sq Epi: x / Non Sq Epi: Few /HPF / Bacteria: Present /HPF      CULTURES:  Culture Results:   No growth to date ( @ 19:45)  Culture Results:   No growth ( @ 19:13)      RADIOLOGY & ADDITIONAL STUDIES: 1. Concern for an area of diminished attenuation within the left woody   respecting midline which may represent acute infarction. This is seen in   the setting of increased attenuation within the basilar artery concerning   for potential thrombus. CTA could be obtained for further   characterization of the basilar artery, and brain MR with   diffusion-weighted imaging may be helpful for further characterization of   the left woody.  2. 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  3. No CT evidence of acute hemorrhage.    . No significant left carotid bifurcation stenosis.  2. 35% stenosis at origin of right ICAincluding calcified and   noncalcified atheromatous plaque along posterior wall of proximal right   ICA.  3. Left vertebral artery arises directly from aortic arch, normal   variant, and more distally at the skull base, concentric narrowing of   leftvertebral artery.

## 2017-03-05 NOTE — CONSULT NOTE ADULT - ASSESSMENT
50 y/o male admitted with pmhx DM, HTN< hypothyroid, stroke code, intubated for low GCS and airway protection, nsx consulted for elevated OP on LP  -elevated OP pressures could be 2/2 meningitis/sepsis  -no acute neurosurgical intervention  -agree with current ICU management continue broad spectrum abx  -f/u stroke neurology  -wean to extubate as tolerated  -f/u finalization of cultures  -case and images reviewed with Dr. Calderon

## 2017-03-05 NOTE — PROGRESS NOTE ADULT - SUBJECTIVE AND OBJECTIVE BOX
INTERVAL HPI/OVERNIGHT EVENTS:    SUBJECTIVE: Patient seen and examined at bedside.     ROS:  CV: Denies chest pain  Resp: Denies SOB  GI: Denies abdominal pain, constipation, diarrhea, nausea, vomiting  : Denies dysuria  ID: Denies fevers, chills  MSK: Denies joint pain     OBJECTIVE:    VITAL SIGNS:  ICU Vital Signs Last 24 Hrs  T(C): 38.2, Max: 39.1 ( @ 19:45)  T(F): 100.8, Max: 102.3 ( @ 19:45)  HR: 68 (68 - 128)  BP: 181/94 (135/79 - 278/141)  BP(mean): 126 (103 - 200)  ABP: --  ABP(mean): --  RR: 21 (10 - 21)  SpO2: 98% (97% - 100%)    Mode: AC/ CMV (Assist Control/ Continuous Mandatory Ventilation), RR (machine): 14, TV (machine): 570, FiO2: 40, PEEP: 5, ITime: 1, MAP: 10, PIP: 17    I & Os for current day (as of  @ 07:37)  =============================================  IN: 3032 ml / OUT: 1550 ml / NET: 1482 ml    CAPILLARY BLOOD GLUCOSE  102 (05 Mar 2017 06:00)      PHYSICAL EXAM:    General: NAD  HEENT: NC/AT; PERRL, clear conjunctiva  Neck: supple  Respiratory: CTA b/l  Cardiovascular: +S1/S2; RRR  Abdomen: soft, NT/ND; +BS x4  Extremities: WWP, 2+ peripheral pulses b/l; no LE edema  Skin: normal color and turgor; no rash  Neurological:     MEDICATIONS:  MEDICATIONS  (STANDING):  propofol Infusion 5MICROgram(s)/kG/Min IV Continuous <Continuous>  heparin  Injectable 5000Unit(s) SubCutaneous every 8 hours  chlorhexidine 0.12% Liquid 15milliLiter(s) Swish and Spit two times a day  pantoprazole  Injectable 40milliGRAM(s) IV Push daily  ampicillin  IVPB 2Gram(s) IV Intermittent every 4 hours  cefepime  IVPB 2000milliGRAM(s) IV Intermittent every 8 hours  vancomycin  IVPB 1500milliGRAM(s) IV Intermittent every 12 hours  levothyroxine Injectable 50MICROGram(s) IV Push daily  acyclovir IVPB 950milliGRAM(s) IV Intermittent every 8 hours  acyclovir IVPB  IV Intermittent   lactated ringers. 1000milliLiter(s) IV Continuous <Continuous>  insulin lispro (HumaLOG) corrective regimen sliding scale  SubCutaneous every 4 hours  potassium chloride   Powder 40milliEquivalent(s) Oral once    MEDICATIONS  (PRN):  acetaminophen    Suspension 650milliGRAM(s) Oral every 6 hours PRN For Temp greater than 38 C (100.4 F)      ALLERGIES:  Allergies    No Known Allergies    Intolerances        LABS:                        13.5   18.0  )-----------( 261      ( 05 Mar 2017 05:54 )             38.7     05 Mar 2017 05:53    142    |  109    |  24     ----------------------------<  142    3.6     |  22     |  1.32     Ca    7.7        05 Mar 2017 05:53  Phos  2.5       05 Mar 2017 05:53  Mg     1.9       05 Mar 2017 05:53    TPro  6.1    /  Alb  2.7    /  TBili  0.7    /  DBili  x      /  AST  14     /  ALT  15     /  AlkPhos  38     05 Mar 2017 05:53    PT/INR - ( 04 Mar 2017 13:49 )   PT: 11.0 sec;   INR: 0.99          PTT - ( 04 Mar 2017 13:49 )  PTT:32.7 sec  Urinalysis Basic - ( 04 Mar 2017 15:05 )    Color: Yellow / Appearance: Clear / S.020 / pH: x  Gluc: x / Ketone: 40 mg/dL  / Bili: NEGATIVE / Urobili: 0.2 E.U./dL   Blood: x / Protein: >=300 mg/dL / Nitrite: NEGATIVE   Leuk Esterase: NEGATIVE / RBC: < 5 /HPF / WBC < 5 /HPF   Sq Epi: x / Non Sq Epi: Few /HPF / Bacteria: Present /HPF        RADIOLOGY & ADDITIONAL TESTS: Reviewed.    A/P: 51M PMH of HTN, HLD, hypothyroidism and DM2 who presents with a chief complaint of AMS.    NEURO: intubated  #AMS: unclear etiology at this time however likely from severe sepsis with suspicion for meningitis in setting of AMS and fevers with rigor, CT head showing acute infarct but CT angio benign and per neurology attending less likely ischemic event, possible DKA contributing to AMS.  -pt intubated and sedated in ED for GCS score of 7 on arrival and for protection of airway, stroke code called however AMS likely to other etiology per neurology.  -pt in DKA although less likely major cause of AMS, will continue to treat with insulin drip.  - pt with severe sepsis with tachycardia, rigors/fevers, wbc count elevation and elevated lactic acid, s/p 4L NS in ED, BP elevated, not requiring pressors  -UA benign, CXR without definitive infiltrate, concern for meningitis, need to do LP to r/o bacterial meningitis.  -will f/u neurology recs, treat for severe sepsis and presumed bacterial meningitis with vancomycin, ampicillin and cefepime with broad spectrum abx.  -trend lactic acid, bmp, mg, phos [id number].  -will consult vascular for lower extremity cellulitis/h/o MSSA which vascular was treating and obtain ESR/CRP.  -check echocardiogram and MRI head, f/u urine and blood cultures.  -admit to MICU for further monitoring.  *** added HSV to CSF and added acyclovir to antimicrobials    ENDO:  #DKA: insulin gtt will bridge  #Hypothyroid: levothyroxine IV INTERVAL HPI/OVERNIGHT EVENTS: bridged off insulin gtt w/ Lantus 30U at bedtime and switched from D51/2NS to LR.    SUBJECTIVE: Patient seen and examined at bedside.     ROS: limited as pt sedated and intubated.    OBJECTIVE:    VITAL SIGNS:  ICU Vital Signs Last 24 Hrs  T(C): 38.2, Max: 39.1 ( @ 19:45)  T(F): 100.8, Max: 102.3 ( @ 19:45)  HR: 68 (68 - 128)  BP: 181/94 (135/79 - 278/141)  BP(mean): 126 (103 - 200)  ABP: --  ABP(mean): --  RR: 21 (10 - 21)  SpO2: 98% (97% - 100%)    Mode: AC/ CMV (Assist Control/ Continuous Mandatory Ventilation), RR (machine): 14, TV (machine): 570, FiO2: 40, PEEP: 5, ITime: 1, MAP: 10, PIP: 17    I & Os for current day (as of  @ 07:37)  =============================================  IN: 3032 ml / OUT: 1550 ml / NET: 1482 ml    CAPILLARY BLOOD GLUCOSE  102 (05 Mar 2017 06:00)      PHYSICAL EXAM:    General: NAD  HEENT: NC/AT; PERRL, clear conjunctiva  Neck: no JVD  Respiratory: CTA b/l  Cardiovascular: +S1/S2; RRR, no M/R/G  Abdomen: soft, NT/ND; +BS x4  Extremities: WWP; no LE edema  Skin: salmon colored macular rash over trunk and b/l LE. LLE 4cmx2.5cm superficial lesion of healing wound  Neurological: sedated. reacts to noxious stimuli. rigors during examination. no myoclonus. babinski neg. deep tendon reflexes intact.    MEDICATIONS:  MEDICATIONS  (STANDING):  propofol Infusion 5MICROgram(s)/kG/Min IV Continuous <Continuous>  heparin  Injectable 5000Unit(s) SubCutaneous every 8 hours  chlorhexidine 0.12% Liquid 15milliLiter(s) Swish and Spit two times a day  pantoprazole  Injectable 40milliGRAM(s) IV Push daily  ampicillin  IVPB 2Gram(s) IV Intermittent every 4 hours  cefepime  IVPB 2000milliGRAM(s) IV Intermittent every 8 hours  vancomycin  IVPB 1500milliGRAM(s) IV Intermittent every 12 hours  levothyroxine Injectable 50MICROGram(s) IV Push daily  acyclovir IVPB 950milliGRAM(s) IV Intermittent every 8 hours  acyclovir IVPB  IV Intermittent   lactated ringers. 1000milliLiter(s) IV Continuous <Continuous>  insulin lispro (HumaLOG) corrective regimen sliding scale  SubCutaneous every 4 hours  potassium chloride   Powder 40milliEquivalent(s) Oral once    MEDICATIONS  (PRN):  acetaminophen    Suspension 650milliGRAM(s) Oral every 6 hours PRN For Temp greater than 38 C (100.4 F)      ALLERGIES:  Allergies    No Known Allergies    Intolerances        LABS:                        13.5   18.0  )-----------( 261      ( 05 Mar 2017 05:54 )             38.7     05 Mar 2017 05:53    142    |  109    |  24     ----------------------------<  142    3.6     |  22     |  1.32     Ca    7.7        05 Mar 2017 05:53  Phos  2.5       05 Mar 2017 05:53  Mg     1.9       05 Mar 2017 05:53    TPro  6.1    /  Alb  2.7    /  TBili  0.7    /  DBili  x      /  AST  14     /  ALT  15     /  AlkPhos  38     05 Mar 2017 05:53    PT/INR - ( 04 Mar 2017 13:49 )   PT: 11.0 sec;   INR: 0.99          PTT - ( 04 Mar 2017 13:49 )  PTT:32.7 sec  Urinalysis Basic - ( 04 Mar 2017 15:05 )    Color: Yellow / Appearance: Clear / S.020 / pH: x  Gluc: x / Ketone: 40 mg/dL  / Bili: NEGATIVE / Urobili: 0.2 E.U./dL   Blood: x / Protein: >=300 mg/dL / Nitrite: NEGATIVE   Leuk Esterase: NEGATIVE / RBC: < 5 /HPF / WBC < 5 /HPF   Sq Epi: x / Non Sq Epi: Few /HPF / Bacteria: Present /HPF        RADIOLOGY & ADDITIONAL TESTS: Reviewed.    A/P: 51M PMH of HTN, HLD, hypothyroidism and DM2 who presents with a chief complaint of AMS.    NEURO: intubated  #AMS: unclear etiology. pt in severe sepsis w/ fever on admission, elevated WBCs,  however likely from severe sepsis with suspicion for meningitis in setting of AMS and fevers with rigor, CT head showing acute infarct but CT angio benign and per neurology attending less likely ischemic event, possible DKA contributing to AMS.  -pt intubated and sedated in ED for GCS score of 7 on arrival and for protection of airway, stroke code called however AMS likely to other etiology per neurology.  -pt in DKA although less likely major cause of AMS, will continue to treat with insulin drip.  - pt with severe sepsis with tachycardia, rigors/fevers, wbc count elevation and elevated lactic acid, s/p 4L NS in ED, BP elevated, not requiring pressors  -UA benign, CXR without definitive infiltrate, concern for meningitis, need to do LP to r/o bacterial meningitis.  -will f/u neurology recs, treat for severe sepsis and presumed bacterial meningitis with vancomycin, ampicillin and cefepime with broad spectrum abx.  -trend lactic acid, bmp, mg, phos [id number].  -will consult vascular for lower extremity cellulitis/h/o MSSA which vascular was treating and obtain ESR/CRP.  -check echocardiogram and MRI head, f/u urine and blood cultures.  -admit to MICU for further monitoring.  *** added HSV to CSF and added acyclovir to antimicrobials    ENDO:  #DKA: insulin gtt will bridge  #Hypothyroid: levothyroxine IV INTERVAL HPI/OVERNIGHT EVENTS: bridged off insulin gtt w/ Lantus 30U at bedtime and switched from D51/2NS to LR.    SUBJECTIVE: Patient seen and examined at bedside.     ROS: limited as pt sedated and intubated.    OBJECTIVE:    VITAL SIGNS:  ICU Vital Signs Last 24 Hrs  T(C): 38.2, Max: 39.1 ( @ 19:45)  T(F): 100.8, Max: 102.3 ( @ 19:45)  HR: 68 (68 - 128)  BP: 181/94 (135/79 - 278/141)  BP(mean): 126 (103 - 200)  ABP: --  ABP(mean): --  RR: 21 (10 - 21)  SpO2: 98% (97% - 100%)    Mode: AC/ CMV (Assist Control/ Continuous Mandatory Ventilation), RR (machine): 14, TV (machine): 570, FiO2: 40, PEEP: 5, ITime: 1, MAP: 10, PIP: 17    I & Os for current day (as of  @ 07:37)  =============================================  IN: 3032 ml / OUT: 1550 ml / NET: 1482 ml    CAPILLARY BLOOD GLUCOSE  102 (05 Mar 2017 06:00)      PHYSICAL EXAM:    General: NAD  HEENT: NC/AT; PERRL, clear conjunctiva  Neck: no JVD  Respiratory: CTA b/l  Cardiovascular: +S1/S2; RRR, no M/R/G  Abdomen: soft, NT/ND; +BS x4  Extremities: WWP; no LE edema  Skin: salmon colored macular rash over trunk and b/l LE. LLE 4cmx2.5cm superficial lesion of healing wound  Neurological: sedated. reacts to noxious stimuli. rigors during examination. no myoclonus. babinski neg. deep tendon reflexes intact.    MEDICATIONS:  MEDICATIONS  (STANDING):  propofol Infusion 5MICROgram(s)/kG/Min IV Continuous <Continuous>  heparin  Injectable 5000Unit(s) SubCutaneous every 8 hours  chlorhexidine 0.12% Liquid 15milliLiter(s) Swish and Spit two times a day  pantoprazole  Injectable 40milliGRAM(s) IV Push daily  ampicillin  IVPB 2Gram(s) IV Intermittent every 4 hours  cefepime  IVPB 2000milliGRAM(s) IV Intermittent every 8 hours  vancomycin  IVPB 1500milliGRAM(s) IV Intermittent every 12 hours  levothyroxine Injectable 50MICROGram(s) IV Push daily  acyclovir IVPB 950milliGRAM(s) IV Intermittent every 8 hours  acyclovir IVPB  IV Intermittent   lactated ringers. 1000milliLiter(s) IV Continuous <Continuous>  insulin lispro (HumaLOG) corrective regimen sliding scale  SubCutaneous every 4 hours  potassium chloride   Powder 40milliEquivalent(s) Oral once    MEDICATIONS  (PRN):  acetaminophen    Suspension 650milliGRAM(s) Oral every 6 hours PRN For Temp greater than 38 C (100.4 F)      ALLERGIES:  Allergies    No Known Allergies    Intolerances        LABS:                        13.5   18.0  )-----------( 261      ( 05 Mar 2017 05:54 )             38.7     05 Mar 2017 05:53    142    |  109    |  24     ----------------------------<  142    3.6     |  22     |  1.32     Ca    7.7        05 Mar 2017 05:53  Phos  2.5       05 Mar 2017 05:53  Mg     1.9       05 Mar 2017 05:53    TPro  6.1    /  Alb  2.7    /  TBili  0.7    /  DBili  x      /  AST  14     /  ALT  15     /  AlkPhos  38     05 Mar 2017 05:53    PT/INR - ( 04 Mar 2017 13:49 )   PT: 11.0 sec;   INR: 0.99          PTT - ( 04 Mar 2017 13:49 )  PTT:32.7 sec  Urinalysis Basic - ( 04 Mar 2017 15:05 )    Color: Yellow / Appearance: Clear / S.020 / pH: x  Gluc: x / Ketone: 40 mg/dL  / Bili: NEGATIVE / Urobili: 0.2 E.U./dL   Blood: x / Protein: >=300 mg/dL / Nitrite: NEGATIVE   Leuk Esterase: NEGATIVE / RBC: < 5 /HPF / WBC < 5 /HPF   Sq Epi: x / Non Sq Epi: Few /HPF / Bacteria: Present /HPF        RADIOLOGY & ADDITIONAL TESTS: Reviewed.    A/P: 51M PMH of HTN, HLD, hypothyroidism and DM2 who presents with a chief complaint of AMS.    NEURO: intubated  #AMS: unclear etiology. pt in severe sepsis w/ fever and rigors on admission, elevated WBCs, and AMS. CSF high in glucose w/ little WBCs present.  however likely from severe sepsis with suspicion for meningitis in setting of AMS and fevers with rigor, CT head showing acute infarct but CT angio benign and per neurology attending less likely ischemic event, possible DKA contributing to AMS.  -pt intubated and sedated in ED for GCS score of 7 on arrival and for protection of airway, stroke code called however AMS likely to other etiology per neurology.  -pt in DKA although less likely major cause of AMS, will continue to treat with insulin drip.  - pt with severe sepsis with tachycardia, rigors/fevers, wbc count elevation and elevated lactic acid, s/p 4L NS in ED, BP elevated, not requiring pressors  -UA benign, CXR without definitive infiltrate, concern for meningitis, need to do LP to r/o bacterial meningitis.  -will f/u neurology recs, treat for severe sepsis and presumed bacterial meningitis with vancomycin, ampicillin and cefepime with broad spectrum abx.  -trend lactic acid, bmp, mg, phos [id number].  -will consult vascular for lower extremity cellulitis/h/o MSSA which vascular was treating and obtain ESR/CRP.  -check echocardiogram and MRI head, f/u urine and blood cultures.  -admit to MICU for further monitoring.  *** added HSV to CSF and added acyclovir to antimicrobials    ENDO:  #DKA: insulin gtt will bridge  #Hypothyroid: levothyroxine IV INTERVAL HPI/OVERNIGHT EVENTS: bridged off insulin gtt w/ Lantus 30U at bedtime and switched from D51/2NS to LR.    SUBJECTIVE: Patient seen and examined at bedside.     ROS: limited as pt sedated and intubated.    OBJECTIVE:    VITAL SIGNS:  ICU Vital Signs Last 24 Hrs  T(C): 38.2, Max: 39.1 ( @ 19:45)  T(F): 100.8, Max: 102.3 ( @ 19:45)  HR: 68 (68 - 128)  BP: 181/94 (135/79 - 278/141)  BP(mean): 126 (103 - 200)  ABP: --  ABP(mean): --  RR: 21 (10 - 21)  SpO2: 98% (97% - 100%)    Mode: AC/ CMV (Assist Control/ Continuous Mandatory Ventilation), RR (machine): 14, TV (machine): 570, FiO2: 40, PEEP: 5, ITime: 1, MAP: 10, PIP: 17    I & Os for current day (as of  @ 07:37)  =============================================  IN: 3032 ml / OUT: 1550 ml / NET: 1482 ml    CAPILLARY BLOOD GLUCOSE  102 (05 Mar 2017 06:00)      PHYSICAL EXAM:    General: NAD  HEENT: NC/AT; PERRL, clear conjunctiva  Neck: no JVD  Respiratory: CTA b/l  Cardiovascular: +S1/S2; RRR, no M/R/G  Abdomen: soft, NT/ND; +BS x4  Extremities: WWP; no LE edema  Skin: salmon colored macular rash over trunk and b/l LE. LLE 4cmx2.5cm superficial lesion of healing wound  Neurological: sedated. reacts to noxious stimuli. rigors during examination. no myoclonus. babinski neg. deep tendon reflexes intact.    MEDICATIONS:  MEDICATIONS  (STANDING):  propofol Infusion 5MICROgram(s)/kG/Min IV Continuous <Continuous>  heparin  Injectable 5000Unit(s) SubCutaneous every 8 hours  chlorhexidine 0.12% Liquid 15milliLiter(s) Swish and Spit two times a day  pantoprazole  Injectable 40milliGRAM(s) IV Push daily  ampicillin  IVPB 2Gram(s) IV Intermittent every 4 hours  cefepime  IVPB 2000milliGRAM(s) IV Intermittent every 8 hours  vancomycin  IVPB 1500milliGRAM(s) IV Intermittent every 12 hours  levothyroxine Injectable 50MICROGram(s) IV Push daily  acyclovir IVPB 950milliGRAM(s) IV Intermittent every 8 hours  acyclovir IVPB  IV Intermittent   lactated ringers. 1000milliLiter(s) IV Continuous <Continuous>  insulin lispro (HumaLOG) corrective regimen sliding scale  SubCutaneous every 4 hours  potassium chloride   Powder 40milliEquivalent(s) Oral once    MEDICATIONS  (PRN):  acetaminophen    Suspension 650milliGRAM(s) Oral every 6 hours PRN For Temp greater than 38 C (100.4 F)      ALLERGIES:  Allergies    No Known Allergies    Intolerances        LABS:                        13.5   18.0  )-----------( 261      ( 05 Mar 2017 05:54 )             38.7     05 Mar 2017 05:53    142    |  109    |  24     ----------------------------<  142    3.6     |  22     |  1.32     Ca    7.7        05 Mar 2017 05:53  Phos  2.5       05 Mar 2017 05:53  Mg     1.9       05 Mar 2017 05:53    TPro  6.1    /  Alb  2.7    /  TBili  0.7    /  DBili  x      /  AST  14     /  ALT  15     /  AlkPhos  38     05 Mar 2017 05:53    PT/INR - ( 04 Mar 2017 13:49 )   PT: 11.0 sec;   INR: 0.99          PTT - ( 04 Mar 2017 13:49 )  PTT:32.7 sec  Urinalysis Basic - ( 04 Mar 2017 15:05 )    Color: Yellow / Appearance: Clear / S.020 / pH: x  Gluc: x / Ketone: 40 mg/dL  / Bili: NEGATIVE / Urobili: 0.2 E.U./dL   Blood: x / Protein: >=300 mg/dL / Nitrite: NEGATIVE   Leuk Esterase: NEGATIVE / RBC: < 5 /HPF / WBC < 5 /HPF   Sq Epi: x / Non Sq Epi: Few /HPF / Bacteria: Present /HPF        RADIOLOGY & ADDITIONAL TESTS: Reviewed.    A/P: 51M PMH of HTN, HLD, hypothyroidism and DM2 who presents with a chief complaint of AMS.    NEURO: intubated  #AMS: unclear etiology. pt in severe sepsis w/ fever and rigors on admission, elevated WBCs, and AMS. CSF high in glucose, elevated protein 74, and w/ little WBCs present.  however likely from severe sepsis with suspicion for meningitis in setting of AMS and fevers with rigor, CT head showing acute infarct but CT angio benign and per neurology attending less likely ischemic event, possible DKA contributing to AMS.  -pt intubated and sedated in ED for GCS score of 7 on arrival and for protection of airway, stroke code called however AMS likely to other etiology per neurology.  -pt in DKA although less likely major cause of AMS, will continue to treat with insulin drip.  - pt with severe sepsis with tachycardia, rigors/fevers, wbc count elevation and elevated lactic acid, s/p 4L NS in ED, BP elevated, not requiring pressors  -UA benign, CXR without definitive infiltrate, concern for meningitis, need to do LP to r/o bacterial meningitis.  -will f/u neurology recs, treat for severe sepsis and presumed bacterial meningitis with vancomycin, ampicillin and cefepime with broad spectrum abx.  -trend lactic acid, bmp, mg, phos [id number].  -will consult vascular for lower extremity cellulitis/h/o MSSA which vascular was treating and obtain ESR/CRP.  -check echocardiogram and MRI head, f/u urine and blood cultures.  -admit to MICU for further monitoring.  *** added HSV to CSF and added acyclovir to antimicrobials    ENDO:  #DKA: insulin gtt will bridge  #Hypothyroid: levothyroxine IV INTERVAL HPI/OVERNIGHT EVENTS: bridged off insulin gtt w/ Lantus 30U at bedtime and switched from D51/2NS to LR.    SUBJECTIVE: Patient seen and examined at bedside.     ROS: limited as pt sedated and intubated.    OBJECTIVE:    VITAL SIGNS:  ICU Vital Signs Last 24 Hrs  T(C): 38.2, Max: 39.1 ( @ 19:45)  T(F): 100.8, Max: 102.3 ( @ 19:45)  HR: 68 (68 - 128)  BP: 181/94 (135/79 - 278/141)  BP(mean): 126 (103 - 200)  ABP: --  ABP(mean): --  RR: 21 (10 - 21)  SpO2: 98% (97% - 100%)    Mode: AC/ CMV (Assist Control/ Continuous Mandatory Ventilation), RR (machine): 14, TV (machine): 570, FiO2: 40, PEEP: 5, ITime: 1, MAP: 10, PIP: 17    I & Os for current day (as of  @ 07:37)  =============================================  IN: 3032 ml / OUT: 1550 ml / NET: 1482 ml    CAPILLARY BLOOD GLUCOSE  102 (05 Mar 2017 06:00)      PHYSICAL EXAM:    General: NAD  HEENT: NC/AT; PERRL, clear conjunctiva  Neck: no JVD  Respiratory: CTA b/l  Cardiovascular: +S1/S2; RRR, no M/R/G  Abdomen: soft, NT/ND; +BS x4  Extremities: WWP; no LE edema  Skin: salmon colored macular rash over trunk and b/l LE. LLE 4cmx2.5cm superficial lesion of healing wound  Neurological: sedated. reacts to noxious stimuli. rigors during examination. no myoclonus. babinski neg. deep tendon reflexes intact.    MEDICATIONS:  MEDICATIONS  (STANDING):  propofol Infusion 5MICROgram(s)/kG/Min IV Continuous <Continuous>  heparin  Injectable 5000Unit(s) SubCutaneous every 8 hours  chlorhexidine 0.12% Liquid 15milliLiter(s) Swish and Spit two times a day  pantoprazole  Injectable 40milliGRAM(s) IV Push daily  ampicillin  IVPB 2Gram(s) IV Intermittent every 4 hours  cefepime  IVPB 2000milliGRAM(s) IV Intermittent every 8 hours  vancomycin  IVPB 1500milliGRAM(s) IV Intermittent every 12 hours  levothyroxine Injectable 50MICROGram(s) IV Push daily  acyclovir IVPB 950milliGRAM(s) IV Intermittent every 8 hours  acyclovir IVPB  IV Intermittent   lactated ringers. 1000milliLiter(s) IV Continuous <Continuous>  insulin lispro (HumaLOG) corrective regimen sliding scale  SubCutaneous every 4 hours  potassium chloride   Powder 40milliEquivalent(s) Oral once    MEDICATIONS  (PRN):  acetaminophen    Suspension 650milliGRAM(s) Oral every 6 hours PRN For Temp greater than 38 C (100.4 F)      ALLERGIES:  Allergies    No Known Allergies    Intolerances        LABS:                        13.5   18.0  )-----------( 261      ( 05 Mar 2017 05:54 )             38.7     05 Mar 2017 05:53    142    |  109    |  24     ----------------------------<  142    3.6     |  22     |  1.32     Ca    7.7        05 Mar 2017 05:53  Phos  2.5       05 Mar 2017 05:53  Mg     1.9       05 Mar 2017 05:53    TPro  6.1    /  Alb  2.7    /  TBili  0.7    /  DBili  x      /  AST  14     /  ALT  15     /  AlkPhos  38     05 Mar 2017 05:53    PT/INR - ( 04 Mar 2017 13:49 )   PT: 11.0 sec;   INR: 0.99          PTT - ( 04 Mar 2017 13:49 )  PTT:32.7 sec  Urinalysis Basic - ( 04 Mar 2017 15:05 )    Color: Yellow / Appearance: Clear / S.020 / pH: x  Gluc: x / Ketone: 40 mg/dL  / Bili: NEGATIVE / Urobili: 0.2 E.U./dL   Blood: x / Protein: >=300 mg/dL / Nitrite: NEGATIVE   Leuk Esterase: NEGATIVE / RBC: < 5 /HPF / WBC < 5 /HPF   Sq Epi: x / Non Sq Epi: Few /HPF / Bacteria: Present /HPF        RADIOLOGY & ADDITIONAL TESTS: Reviewed.    A/P: 51M PMH of HTN, HLD, hypothyroidism and DM who presents with a chief complaint of AMS.    NEURO: intubated  #AMS: unclear etiology. pt in severe sepsis w/ fever and rigors on admission, elevated WBCs, and AMS. CSF high in glucose, elevated protein 74, and w/ little WBCs present; possible encephalitis etiology. CT head showing diminished attenuation in L woody, CTA head done w/o evidence of critical stenosis, thus low likelihood of stroke. DKA also possibly contributing to AMS; UA and CXR w/o evidence of infection  -pt intubated and sedated in ED for GCS score of 7 on arrival and for protection of airway, stroke code called however AMS likely to other etiology per neurology.  -pt in DKA on admission, now bridged to Lantus  -currently on cefipine/vanc/ampicillin/acyclovir, will continue pending CSF testing  -general neuro consulted   -will consult vascular for lower extremity cellulitis/h/o MSSA which vascular was treating and obtain ESR/CRP.  -check echocardiogram and MRI head, f/u urine and blood cultures.  -admit to MICU for further monitoring.  *** added HSV to CSF and added acyclovir to antimicrobials    ENDO:  #DKA: insulin gtt will bridge  #Hypothyroid: levothyroxine IV INTERVAL HPI/OVERNIGHT EVENTS: bridged off insulin gtt w/ Lantus 30U at bedtime and switched from D51/2NS to LR.    SUBJECTIVE: Patient seen and examined at bedside.     ROS: limited as pt sedated and intubated.    OBJECTIVE:    VITAL SIGNS:  ICU Vital Signs Last 24 Hrs  T(C): 38.2, Max: 39.1 ( @ 19:45)  T(F): 100.8, Max: 102.3 ( @ 19:45)  HR: 68 (68 - 128)  BP: 181/94 (135/79 - 278/141)  BP(mean): 126 (103 - 200)  ABP: --  ABP(mean): --  RR: 21 (10 - 21)  SpO2: 98% (97% - 100%)    Mode: AC/ CMV (Assist Control/ Continuous Mandatory Ventilation), RR (machine): 14, TV (machine): 570, FiO2: 40, PEEP: 5, ITime: 1, MAP: 10, PIP: 17    I & Os for current day (as of  @ 07:37)  =============================================  IN: 3032 ml / OUT: 1550 ml / NET: 1482 ml    CAPILLARY BLOOD GLUCOSE  102 (05 Mar 2017 06:00)      PHYSICAL EXAM:    General: NAD  HEENT: NC/AT; PERRL, clear conjunctiva  Neck: no JVD  Respiratory: CTA b/l  Cardiovascular: +S1/S2; RRR, no M/R/G  Abdomen: soft, NT/ND; +BS x4  Extremities: WWP; no LE edema  Skin: salmon colored macular rash over trunk and b/l LE. LLE 4cmx2.5cm superficial lesion of healing wound  Neurological: sedated. reacts to noxious stimuli. rigors during examination. no myoclonus. babinski neg. deep tendon reflexes intact.    MEDICATIONS:  MEDICATIONS  (STANDING):  propofol Infusion 5MICROgram(s)/kG/Min IV Continuous <Continuous>  heparin  Injectable 5000Unit(s) SubCutaneous every 8 hours  chlorhexidine 0.12% Liquid 15milliLiter(s) Swish and Spit two times a day  pantoprazole  Injectable 40milliGRAM(s) IV Push daily  ampicillin  IVPB 2Gram(s) IV Intermittent every 4 hours  cefepime  IVPB 2000milliGRAM(s) IV Intermittent every 8 hours  vancomycin  IVPB 1500milliGRAM(s) IV Intermittent every 12 hours  levothyroxine Injectable 50MICROGram(s) IV Push daily  acyclovir IVPB 950milliGRAM(s) IV Intermittent every 8 hours  acyclovir IVPB  IV Intermittent   lactated ringers. 1000milliLiter(s) IV Continuous <Continuous>  insulin lispro (HumaLOG) corrective regimen sliding scale  SubCutaneous every 4 hours  potassium chloride   Powder 40milliEquivalent(s) Oral once    MEDICATIONS  (PRN):  acetaminophen    Suspension 650milliGRAM(s) Oral every 6 hours PRN For Temp greater than 38 C (100.4 F)      ALLERGIES:  Allergies    No Known Allergies    Intolerances        LABS:                        13.5   18.0  )-----------( 261      ( 05 Mar 2017 05:54 )             38.7     05 Mar 2017 05:53    142    |  109    |  24     ----------------------------<  142    3.6     |  22     |  1.32     Ca    7.7        05 Mar 2017 05:53  Phos  2.5       05 Mar 2017 05:53  Mg     1.9       05 Mar 2017 05:53    TPro  6.1    /  Alb  2.7    /  TBili  0.7    /  DBili  x      /  AST  14     /  ALT  15     /  AlkPhos  38     05 Mar 2017 05:53    PT/INR - ( 04 Mar 2017 13:49 )   PT: 11.0 sec;   INR: 0.99          PTT - ( 04 Mar 2017 13:49 )  PTT:32.7 sec  Urinalysis Basic - ( 04 Mar 2017 15:05 )    Color: Yellow / Appearance: Clear / S.020 / pH: x  Gluc: x / Ketone: 40 mg/dL  / Bili: NEGATIVE / Urobili: 0.2 E.U./dL   Blood: x / Protein: >=300 mg/dL / Nitrite: NEGATIVE   Leuk Esterase: NEGATIVE / RBC: < 5 /HPF / WBC < 5 /HPF   Sq Epi: x / Non Sq Epi: Few /HPF / Bacteria: Present /HPF        RADIOLOGY & ADDITIONAL TESTS: Reviewed.    A/P: 51M PMH of HTN, HLD, hypothyroidism and DM who presents with a chief complaint of AMS.    NEURO: intubated  #AMS: unclear etiology. pt in severe sepsis w/ fever and rigors on admission, elevated WBCs, and AMS. CSF high in glucose, elevated protein 74, and w/ little WBCs present; possible encephalitis etiology. CT head showing diminished attenuation in L woody, CTA head done w/o evidence of critical stenosis, thus low likelihood of stroke. DKA also possibly contributing to AMS; UA and CXR w/o evidence of infection  -pt intubated and sedated in ED for GCS score of 7 on arrival and for protection of airway, stroke code called however AMS likely to other etiology per neurology.  -pt in DKA on admission, now bridged to Lantus  -currently on cefipine/vanc/ampicillin/acyclovir, will continue pending CSF testing  -general neuro consulted  -MRI head w/o contrast done today, f/u results  -EEG ordered  -check echocardiogram and MRI head, f/u urine and blood cultures.    ENDO:  #DKA w/ positive hydroxybutyrate and high AG  -bridged to Lantus  #Hypothyroid: levothyroxine IV    VASC:  #LLE ulceration w/ recent debridement and wound vac placement. lost to f/u  -vascular consulted - no intervention at this time  -put in for wound consult/wound care    FEN: NPO  PPX: Protonix 40mg IV qd, HSQ  Dispo: MICU monitoring  Code: FULL CODE

## 2017-03-05 NOTE — PROGRESS NOTE ADULT - SUBJECTIVE AND OBJECTIVE BOX
Pt seen and examined at bedside      ICU Vital Signs Last 24 Hrs  T(C): 38.6, Max: 39.1 (03-04 @ 19:45)  T(F): 101.4, Max: 102.3 (03-04 @ 19:45)  HR: 99 (68 - 128)  BP: 176/73 (135/79 - 278/141)  BP(mean): 111 (103 - 200)  ABP: --  ABP(mean): --  RR: 20 (9 - 21)  SpO2: 99% (97% - 100%)        PHYSICAL EXAM:  Constitutional: Sedated and on mechanical vent  Extremities: b/l LE +psoriasis lesions throughout LLE- healing wound at anterior shin  RLE- small dry healed over ulcer on posterior ankle and another over anterior shin  Vascular: b/l LE 2+ pal DP/POP/FEM biphasic PT                            13.5   18.0  )-----------( 261      ( 05 Mar 2017 05:54 )             38.7   05 Mar 2017 05:53    142    |  109    |  24     ----------------------------<  142    3.6     |  22     |  1.32     Ca    7.7        05 Mar 2017 05:53  Phos  2.5       05 Mar 2017 05:53  Mg     1.9       05 Mar 2017 05:53    TPro  6.1    /  Alb  2.7    /  TBili  0.7    /  DBili  x      /  AST  14     /  ALT  15     /  AlkPhos  38     05 Mar 2017 05:53 Pt seen and examined at bedside currently on mechanical vent and sedated with propofol.      ICU Vital Signs Last 24 Hrs  T(C): 38.6, Max: 39.1 (03-04 @ 19:45)  T(F): 101.4, Max: 102.3 (03-04 @ 19:45)  HR: 99 (68 - 128)  BP: 176/73 (135/79 - 278/141)  BP(mean): 111 (103 - 200)  ABP: --  ABP(mean): --  RR: 20 (9 - 21)  SpO2: 99% (97% - 100%)        PHYSICAL EXAM:  Constitutional: Sedated and on mechanical vent  Extremities: b/l LE +psoriasis lesions throughout LLE- healing wound at anterior shin  RLE- small dry healed over ulcer on posterior ankle and another over anterior shin  Vascular: b/l LE 2+ pal DP/POP/FEM biphasic PT                            13.5   18.0  )-----------( 261      ( 05 Mar 2017 05:54 )             38.7   05 Mar 2017 05:53    142    |  109    |  24     ----------------------------<  142    3.6     |  22     |  1.32     Ca    7.7        05 Mar 2017 05:53  Phos  2.5       05 Mar 2017 05:53  Mg     1.9       05 Mar 2017 05:53    TPro  6.1    /  Alb  2.7    /  TBili  0.7    /  DBili  x      /  AST  14     /  ALT  15     /  AlkPhos  38     05 Mar 2017 05:53

## 2017-03-05 NOTE — PROGRESS NOTE ADULT - ASSESSMENT
51yoM s/p Lt anterior shin wound debridement and vac placement 10/28 post op to plan for skin graft but patient never followed up now wound healed over  -wound care consult  -local wound care  -signing off, please call back with any questions/concerns  -d/w chief resident on call

## 2017-03-06 LAB
ALBUMIN SERPL ELPH-MCNC: 2 G/DL — LOW (ref 3.4–5)
ALBUMIN SERPL ELPH-MCNC: 2.3 G/DL — LOW (ref 3.4–5)
ALP SERPL-CCNC: 25 U/L — LOW (ref 40–120)
ALP SERPL-CCNC: 29 U/L — LOW (ref 40–120)
ALT FLD-CCNC: 14 U/L — SIGNIFICANT CHANGE UP (ref 12–42)
ALT FLD-CCNC: SIGNIFICANT CHANGE UP U/L (ref 12–42)
ANION GAP SERPL CALC-SCNC: 6 MMOL/L — LOW (ref 9–16)
ANION GAP SERPL CALC-SCNC: 9 MMOL/L — SIGNIFICANT CHANGE UP (ref 9–16)
AST SERPL-CCNC: 23 U/L — SIGNIFICANT CHANGE UP (ref 15–37)
AST SERPL-CCNC: 88 U/L — HIGH (ref 15–37)
BASOPHILS NFR BLD AUTO: 0.2 % — SIGNIFICANT CHANGE UP (ref 0–2)
BILIRUB SERPL-MCNC: 0.6 MG/DL — SIGNIFICANT CHANGE UP (ref 0.2–1.2)
BILIRUB SERPL-MCNC: 1.1 MG/DL — SIGNIFICANT CHANGE UP (ref 0.2–1.2)
BUN SERPL-MCNC: 17 MG/DL — SIGNIFICANT CHANGE UP (ref 7–23)
BUN SERPL-MCNC: 19 MG/DL — SIGNIFICANT CHANGE UP (ref 7–23)
CALCIUM SERPL-MCNC: 7.5 MG/DL — LOW (ref 8.5–10.5)
CALCIUM SERPL-MCNC: 7.8 MG/DL — LOW (ref 8.5–10.5)
CHLORIDE SERPL-SCNC: 111 MMOL/L — HIGH (ref 96–108)
CHLORIDE SERPL-SCNC: 111 MMOL/L — HIGH (ref 96–108)
CO2 SERPL-SCNC: 23 MMOL/L — SIGNIFICANT CHANGE UP (ref 22–31)
CO2 SERPL-SCNC: 24 MMOL/L — SIGNIFICANT CHANGE UP (ref 22–31)
CREAT ?TM UR-MCNC: 43.3 MG/DL — SIGNIFICANT CHANGE UP
CREAT SERPL-MCNC: 1.17 MG/DL — SIGNIFICANT CHANGE UP (ref 0.5–1.3)
CREAT SERPL-MCNC: 1.27 MG/DL — SIGNIFICANT CHANGE UP (ref 0.5–1.3)
EOSINOPHIL NFR BLD AUTO: 0.3 % — SIGNIFICANT CHANGE UP (ref 0–6)
GLUCOSE SERPL-MCNC: 115 MG/DL — HIGH (ref 70–99)
GLUCOSE SERPL-MCNC: 145 MG/DL — HIGH (ref 70–99)
HCT VFR BLD CALC: 34.2 % — LOW (ref 39–50)
HGB BLD-MCNC: 11.7 G/DL — LOW (ref 13–17)
LABORATORY COMMENT REPORT: SIGNIFICANT CHANGE UP
LYMPHOCYTES # BLD AUTO: 17.3 % — SIGNIFICANT CHANGE UP (ref 13–44)
MAGNESIUM SERPL-MCNC: 2.1 MG/DL — SIGNIFICANT CHANGE UP (ref 1.6–2.4)
MAGNESIUM SERPL-MCNC: 2.3 MG/DL — SIGNIFICANT CHANGE UP (ref 1.6–2.4)
MCHC RBC-ENTMCNC: 28.1 PG — SIGNIFICANT CHANGE UP (ref 27–34)
MCHC RBC-ENTMCNC: 34.2 G/DL — SIGNIFICANT CHANGE UP (ref 32–36)
MCV RBC AUTO: 82 FL — SIGNIFICANT CHANGE UP (ref 80–100)
MONOCYTES NFR BLD AUTO: 8 % — SIGNIFICANT CHANGE UP (ref 2–14)
NEUTROPHILS NFR BLD AUTO: 74.2 % — SIGNIFICANT CHANGE UP (ref 43–77)
NIGHT BLUE STAIN TISS: SIGNIFICANT CHANGE UP
PHOSPHATE SERPL-MCNC: 2.4 MG/DL — LOW (ref 2.5–4.5)
PHOSPHATE SERPL-MCNC: 2.7 MG/DL — SIGNIFICANT CHANGE UP (ref 2.5–4.5)
PLATELET # BLD AUTO: 201 K/UL — SIGNIFICANT CHANGE UP (ref 150–400)
POTASSIUM SERPL-MCNC: 3 MMOL/L — LOW (ref 3.5–5.3)
POTASSIUM SERPL-MCNC: 5.5 MMOL/L — HIGH (ref 3.5–5.3)
POTASSIUM SERPL-SCNC: 3 MMOL/L — LOW (ref 3.5–5.3)
POTASSIUM SERPL-SCNC: 5.5 MMOL/L — HIGH (ref 3.5–5.3)
PROT SERPL-MCNC: 5.5 G/DL — LOW (ref 6.4–8.2)
PROT SERPL-MCNC: 5.8 G/DL — LOW (ref 6.4–8.2)
RBC # BLD: 4.17 M/UL — LOW (ref 4.2–5.8)
RBC # FLD: 12.3 % — SIGNIFICANT CHANGE UP (ref 10.3–16.9)
SODIUM SERPL-SCNC: 141 MMOL/L — SIGNIFICANT CHANGE UP (ref 135–145)
SODIUM SERPL-SCNC: 143 MMOL/L — SIGNIFICANT CHANGE UP (ref 135–145)
SODIUM UR-SCNC: 75 MMOL/L — SIGNIFICANT CHANGE UP
SOURCE HSV 1/2: SIGNIFICANT CHANGE UP
SPECIMEN SOURCE: SIGNIFICANT CHANGE UP
VANCOMYCIN TROUGH SERPL-MCNC: 16.7 UG/ML — SIGNIFICANT CHANGE UP (ref 10–20)
WBC # BLD: 12.5 K/UL — HIGH (ref 3.8–10.5)
WBC # FLD AUTO: 12.5 K/UL — HIGH (ref 3.8–10.5)

## 2017-03-06 PROCEDURE — 71010: CPT | Mod: 26

## 2017-03-06 PROCEDURE — 99291 CRITICAL CARE FIRST HOUR: CPT

## 2017-03-06 PROCEDURE — 99233 SBSQ HOSP IP/OBS HIGH 50: CPT

## 2017-03-06 PROCEDURE — 99254 IP/OBS CNSLTJ NEW/EST MOD 60: CPT | Mod: GC

## 2017-03-06 PROCEDURE — 99233 SBSQ HOSP IP/OBS HIGH 50: CPT | Mod: GC

## 2017-03-06 PROCEDURE — 93010 ELECTROCARDIOGRAM REPORT: CPT

## 2017-03-06 RX ORDER — LEVOTHYROXINE SODIUM 125 MCG
100 TABLET ORAL DAILY
Qty: 0 | Refills: 0 | Status: DISCONTINUED | OUTPATIENT
Start: 2017-03-07 | End: 2017-03-12

## 2017-03-06 RX ORDER — LABETALOL HCL 100 MG
10 TABLET ORAL ONCE
Qty: 0 | Refills: 0 | Status: DISCONTINUED | OUTPATIENT
Start: 2017-03-06 | End: 2017-03-06

## 2017-03-06 RX ORDER — METOPROLOL TARTRATE 50 MG
25 TABLET ORAL EVERY 12 HOURS
Qty: 0 | Refills: 0 | Status: DISCONTINUED | OUTPATIENT
Start: 2017-03-06 | End: 2017-03-07

## 2017-03-06 RX ORDER — POTASSIUM CHLORIDE 20 MEQ
40 PACKET (EA) ORAL ONCE
Qty: 0 | Refills: 0 | Status: COMPLETED | OUTPATIENT
Start: 2017-03-06 | End: 2017-03-06

## 2017-03-06 RX ORDER — POTASSIUM CHLORIDE 20 MEQ
10 PACKET (EA) ORAL
Qty: 0 | Refills: 0 | Status: COMPLETED | OUTPATIENT
Start: 2017-03-06 | End: 2017-03-06

## 2017-03-06 RX ORDER — INSULIN GLARGINE 100 [IU]/ML
30 INJECTION, SOLUTION SUBCUTANEOUS EVERY MORNING
Qty: 0 | Refills: 0 | Status: DISCONTINUED | OUTPATIENT
Start: 2017-03-06 | End: 2017-03-06

## 2017-03-06 RX ORDER — INSULIN GLARGINE 100 [IU]/ML
30 INJECTION, SOLUTION SUBCUTANEOUS AT BEDTIME
Qty: 0 | Refills: 0 | Status: DISCONTINUED | OUTPATIENT
Start: 2017-03-06 | End: 2017-03-07

## 2017-03-06 RX ORDER — CEFTRIAXONE 500 MG/1
2 INJECTION, POWDER, FOR SOLUTION INTRAMUSCULAR; INTRAVENOUS EVERY 12 HOURS
Qty: 0 | Refills: 0 | Status: DISCONTINUED | OUTPATIENT
Start: 2017-03-06 | End: 2017-03-06

## 2017-03-06 RX ORDER — CEFTRIAXONE 500 MG/1
INJECTION, POWDER, FOR SOLUTION INTRAMUSCULAR; INTRAVENOUS
Qty: 0 | Refills: 0 | Status: DISCONTINUED | OUTPATIENT
Start: 2017-03-06 | End: 2017-03-06

## 2017-03-06 RX ORDER — VALSARTAN 80 MG/1
320 TABLET ORAL DAILY
Qty: 0 | Refills: 0 | Status: DISCONTINUED | OUTPATIENT
Start: 2017-03-06 | End: 2017-03-11

## 2017-03-06 RX ORDER — CEFTRIAXONE 500 MG/1
2 INJECTION, POWDER, FOR SOLUTION INTRAMUSCULAR; INTRAVENOUS EVERY 12 HOURS
Qty: 0 | Refills: 0 | Status: DISCONTINUED | OUTPATIENT
Start: 2017-03-06 | End: 2017-03-07

## 2017-03-06 RX ADMIN — LEVETIRACETAM 420 MILLIGRAM(S): 250 TABLET, FILM COATED ORAL at 07:19

## 2017-03-06 RX ADMIN — Medication 216 GRAM(S): at 07:51

## 2017-03-06 RX ADMIN — PROPOFOL 2.73 MICROGRAM(S)/KG/MIN: 10 INJECTION, EMULSION INTRAVENOUS at 06:40

## 2017-03-06 RX ADMIN — PROPOFOL 2.73 MICROGRAM(S)/KG/MIN: 10 INJECTION, EMULSION INTRAVENOUS at 01:28

## 2017-03-06 RX ADMIN — Medication 2: at 01:59

## 2017-03-06 RX ADMIN — Medication 25 MILLIGRAM(S): at 22:00

## 2017-03-06 RX ADMIN — Medication 100 MILLIEQUIVALENT(S): at 13:40

## 2017-03-06 RX ADMIN — Medication 216 GRAM(S): at 03:51

## 2017-03-06 RX ADMIN — CEFEPIME 100 MILLIGRAM(S): 1 INJECTION, POWDER, FOR SOLUTION INTRAMUSCULAR; INTRAVENOUS at 07:00

## 2017-03-06 RX ADMIN — Medication 269 MILLIGRAM(S): at 05:32

## 2017-03-06 RX ADMIN — Medication 40 MILLIEQUIVALENT(S): at 18:57

## 2017-03-06 RX ADMIN — Medication 50 MICROGRAM(S): at 05:19

## 2017-03-06 RX ADMIN — CHLORHEXIDINE GLUCONATE 15 MILLILITER(S): 213 SOLUTION TOPICAL at 18:57

## 2017-03-06 RX ADMIN — VALSARTAN 320 MILLIGRAM(S): 80 TABLET ORAL at 14:38

## 2017-03-06 RX ADMIN — LEVETIRACETAM 420 MILLIGRAM(S): 250 TABLET, FILM COATED ORAL at 19:23

## 2017-03-06 RX ADMIN — Medication 40 MILLIEQUIVALENT(S): at 14:38

## 2017-03-06 RX ADMIN — Medication 100 MILLIEQUIVALENT(S): at 16:52

## 2017-03-06 RX ADMIN — PANTOPRAZOLE SODIUM 40 MILLIGRAM(S): 20 TABLET, DELAYED RELEASE ORAL at 14:39

## 2017-03-06 RX ADMIN — CHLORHEXIDINE GLUCONATE 15 MILLILITER(S): 213 SOLUTION TOPICAL at 05:19

## 2017-03-06 RX ADMIN — HEPARIN SODIUM 5000 UNIT(S): 5000 INJECTION INTRAVENOUS; SUBCUTANEOUS at 14:38

## 2017-03-06 RX ADMIN — Medication 300 MILLIGRAM(S): at 04:53

## 2017-03-06 RX ADMIN — CEFTRIAXONE 100 GRAM(S): 500 INJECTION, POWDER, FOR SOLUTION INTRAMUSCULAR; INTRAVENOUS at 14:38

## 2017-03-06 RX ADMIN — HEPARIN SODIUM 5000 UNIT(S): 5000 INJECTION INTRAVENOUS; SUBCUTANEOUS at 05:19

## 2017-03-06 RX ADMIN — HEPARIN SODIUM 5000 UNIT(S): 5000 INJECTION INTRAVENOUS; SUBCUTANEOUS at 22:00

## 2017-03-06 RX ADMIN — INSULIN GLARGINE 30 UNIT(S): 100 INJECTION, SOLUTION SUBCUTANEOUS at 22:00

## 2017-03-06 RX ADMIN — Medication 300 MILLIGRAM(S): at 16:52

## 2017-03-06 RX ADMIN — Medication 269 MILLIGRAM(S): at 14:38

## 2017-03-06 RX ADMIN — Medication 100 MILLIEQUIVALENT(S): at 14:38

## 2017-03-06 RX ADMIN — AMLODIPINE BESYLATE 10 MILLIGRAM(S): 2.5 TABLET ORAL at 05:32

## 2017-03-06 RX ADMIN — Medication 8: at 21:54

## 2017-03-06 NOTE — CONSULT NOTE ADULT - SUBJECTIVE AND OBJECTIVE BOX
50 yo M with  uncontrolled DM, HTN, hypothyroidism was BIBEMS for slurred speech and weakness on left side. Source of HPI is pt's fiance and ED documentation as pt is currently intubated and sedated. As per pt's fiance he was in his usual state of health until yesterday 8PM, when she noted he was weak and had slurred speech. They went out to dinner and she noted he was very slow (which is unsusual for him). At dinner she noted he has a left facial droop, which he said is all 2/2 his BP meds. Then she noted him overnight to be nauseous, he started vomiting, he was incontinent to urine and feces.  She states when he was walking he was wobbly, but later towards the morning he wasn't even able to get out of bed. This AM when she woke up she noted he was shaking/rigoring so she called EMS. Upon arrival of ambulance he was noted to be lethargic, but responding to commands slowly - at the time his GCS was deemed to be 11. Upon arrival to ED his GCS was ntoed to be 7, pt was obtunded so he was intubated for airway protection. Stroke code was called, pt went for CT head which showed new infarct in woody, CTA head&neck however did not show any stenosis.  Upon chart review noted, the pt was admitted for cellulitis in [facility], had I&D by vascular surgery, was supposed to fu with  as outpatient, but wasn't able to make appointment.   PMH: DM, HTN, hypothyroidism    In ED pt was febrile to 102,. tachycardic to 130, hypertensive to 250/180.  He was given Vanc, Zosyn, started on an insulin gtt and given 3L NS. 52 yo M with  uncontrolled DM, HTN, hypothyroidism was BIBEMS for slurred speech and weakness on left side. Source of HPI is pt's fiance and ED documentation as pt is currently intubated and sedated. As per pt's fiance he was in his usual state of health until yesterday 8PM, when she noted he was weak and had slurred speech. They went out to dinner and she noted he was very slow (which is unusual for him). At dinner she noted he has a left facial droop, which he said is all 2/2 his BP meds. Then she noted him overnight to be nauseous, he started vomiting, he was incontinent to urine and feces.  She states when he was walking he was wobbly, but later towards the morning he wasn't even able to get out of bed. This AM when she woke up she noted he was shaking/rigoring so she called EMS. Upon arrival of ambulance he was noted to be lethargic, but responding to commands slowly - at the time his GCS was deemed to be 11. Upon arrival to ED his GCS was ntoed to be 7, pt was obtunded so he was intubated for airway protection. Stroke code was called, pt went for CT head which showed new infarct in woody, CTA head&neck however did not show any stenosis.  Upon chart review noted, the pt was admitted for cellulitis in November had I&D by vascular surgery, was supposed to fu with Dr. Wheat as outpatient, but wasn't able to make appointment.   PMH: DM, HTN, hypothyroidism    Interim History: Patient was empirically treated with Vancomycin/Cefepime/Ampicillin/Acyclovir from 3/4-3/5 for meningitis in the setting of AMS and fevers. Was extubated today. Currently feels well. Has no recollection of the events that led to his hospitalization and intubation.     ROS:  CONSTITUTIONAL:  Negative fever or chills, feels well  EYES:  Negative  blurry vision or double vision  CARDIOVASCULAR:  Negative for chest pain or palpitations  RESPIRATORY:  Has a cough, no wheezing, or SOB   GASTROINTESTINAL:  Negative for nausea, vomiting, diarrhea, constipation, or abdominal pain  GENITOURINARY:  Negative frequency, urgency or dysuria  NEUROLOGIC:  No headache, confusion, dizziness, lightheadedness    Allergies    No Known Allergies    Intolerances    ANTIBIOTICS/RELEVANT:  antimicrobials  vancomycin  IVPB 1500milliGRAM(s) IV Intermittent every 12 hours  cefTRIAXone   IVPB 2Gram(s) IV Intermittent every 12 hours    OTHER:  heparin  Injectable 5000Unit(s) SubCutaneous every 8 hours  chlorhexidine 0.12% Liquid 15milliLiter(s) Swish and Spit two times a day  pantoprazole  Injectable 40milliGRAM(s) IV Push daily  levothyroxine Injectable 50MICROGram(s) IV Push daily  acetaminophen    Suspension 650milliGRAM(s) Oral every 6 hours PRN  insulin lispro (HumaLOG) corrective regimen sliding scale  SubCutaneous every 4 hours  amLODIPine   Tablet 10milliGRAM(s) Oral daily  levETIRAcetam  IVPB 500milliGRAM(s) IV Intermittent every 12 hours  potassium chloride   Powder 40milliEquivalent(s) Oral once  insulin glargine Injectable (LANTUS) 30Unit(s) SubCutaneous at bedtime  valsartan 320milliGRAM(s) Oral daily  hydrochlorothiazide   Tablet 25milliGRAM(s) Oral daily      Objective:  Vital Signs Last 24 Hrs  T(C): 36.6, Max: 37.8 (03-05 @ 21:45)  T(F): 97.9, Max: 100 (03-05 @ 21:45)  HR: 84 (64 - 97)  BP: 190/94 (131/74 - 191/92)  BP(mean): 135 (93 - 138)  RR: 28 (10 - 31)  SpO2: 97% (97% - 100%)    PHYSICAL EXAM:  General - NAD, well-developed, well-nourished  HEENT - PERRLA, EOMI, no sinus tenderness  CV - S1S2 RRR, no murmurs  Resp - R basilar rhonchi, improved with cough. otherwise clear to auscultation  Abdomen - soft, NTND, +BS  Extremities - WWP, no edema. L shin wound 3x2 cm no erythema or pus, healing with granulation tissue  Skin - lacy erythema of abdomen with scaling, patient reports chronic "rash" from eczema  Neuro: AAOx3, normal strength, no focal deficits      LABS:                        11.7   12.5  )-----------( 201      ( 06 Mar 2017 05:52 )             34.2     06 Mar 2017 10:23    143    |  111    |  17     ----------------------------<  145    3.0     |  23     |  1.27     Ca    7.5        06 Mar 2017 10:23  Phos  2.7       06 Mar 2017 10:23  Mg     2.1       06 Mar 2017 10:23    TPro  5.5    /  Alb  2.3    /  TBili  0.6    /  DBili  x      /  AST  23     /  ALT  14     /  AlkPhos  29     06 Mar 2017 10:23    CSF studies: opening pressure 34, proteins 74, glucose 230, 2 lymphs, 1 RBC, LDH 19    RVP negative     ESR 12, CRP 1.19    MICROBIOLOGY:  blood cx 3/4: NTD  urine cx 3/4: NTD  CSF culture and gram stain 3/4: NTD, no organisms seen. HSV negative      RADIOLOGY & ADDITIONAL STUDIES:  CTH non contrast:   1. Concern for an area of diminished attenuation within the left woody respecting midline which may represent acute infarction. This is seen in   the setting of increased attenuation within the basilar artery concerning for potential thrombus. CTA could be obtained for further   characterization of the basilar artery, and brain MR with diffusion-weighted imaging may be helpful for further characterization of   the left woody.  2. 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  3. No CT evidence of acute hemorrhage.    CTA head/neck:   CTA COW: No significant intracranial stenosis of the [place].  1. No significant left carotid bifurcation stenosis.  2. 35% stenosis at origin of right ICA including calcified and noncalcified atheromatous plaque along posterior wall of proximal right ICA.  3. Left vertebral artery arises directly from aortic arch, normal variant, and more distally at the skull base, concentric narrowing of left vertebral artery.    MRI  head w/o contrast:   1. Punctate areas of acute infarction within the right occipital cortex, left thalamus medially, and just superior to the right splenium within   the periventricular white matter along the medial edge of the posterior body of the right lateral ventricle. Given chronic periventricular and   subcortical microangiopathic disease with evidence of remote lacunar infarcts in the thalami bilaterally and anterior limb of left internal   capsule, these areas of infarction may be on that basis. Other etiologies are not excluded.  2. Absence of CSF suppression in the frontoparietal convexity sulci bilaterally, raises the possibility of meningitis especially given the   presence of mild diffuse ventriculomegaly, which is slightly out of proportion to the degree of parenchymal volume loss. Brain MR with   contrast may be helpful for further evaluation, as failure of CSF suppression may occur due to technical factors.    Echo: moderate concentric LV, EF 65-70%    CXR: no acute infiltrates

## 2017-03-06 NOTE — DIETITIAN INITIAL EVALUATION ADULT. - NS AS NUTRI INTERV ENTERAL NUTRITION
Glucerna 1.2 at 75 mL x 24 hr (route per MD) = 1800 mL TV, 2160 kcal, 108 g protein, & 1449 mL fluid

## 2017-03-06 NOTE — PROGRESS NOTE ADULT - SUBJECTIVE AND OBJECTIVE BOX
INTERVAL HPI/OVERNIGHT EVENTS:    SUBJECTIVE: Patient seen and examined at bedside.     ROS: sedated and intubated. unable to assess.    OBJECTIVE:    VITAL SIGNS:  ICU Vital Signs Last 24 Hrs  T(C): 37, Max: 38.6 ( @ 12:00)  T(F): 98.6, Max: 101.4 ( @ 12:00)  HR: 76 (64 - 102)  BP: 167/92 (131/74 - 182/92)  BP(mean): 123 (93 - 131)  ABP: --  ABP(mean): --  RR: 24 (7 - 26)  SpO2: 100% (97% - 100%)    Mode: AC/ CMV (Assist Control/ Continuous Mandatory Ventilation), RR (machine): 18, TV (machine): 570, FiO2: 40, PEEP: 5, ITime: 1, MAP: 8.5, PIP: 16    I & Os for current day (as of  @ 07:00)  =============================================  IN: 5267.2 ml / OUT: 1480 ml / NET: 3787.2 ml    CAPILLARY BLOOD GLUCOSE  166 (06 Mar 2017 02:00)      PHYSICAL EXAM:    General: NAD  HEENT: NC/AT; PERRL, clear conjunctiva  Neck: supple  Respiratory: CTA b/l  Cardiovascular: +S1/S2; RRR  Abdomen: soft, NT/ND; +BS x4  Extremities: WWP, 2+ peripheral pulses b/l; no LE edema  Skin: normal color and turgor; no rash  Neurological:     MEDICATIONS:  MEDICATIONS  (STANDING):  propofol Infusion 5MICROgram(s)/kG/Min IV Continuous <Continuous>  heparin  Injectable 5000Unit(s) SubCutaneous every 8 hours  chlorhexidine 0.12% Liquid 15milliLiter(s) Swish and Spit two times a day  pantoprazole  Injectable 40milliGRAM(s) IV Push daily  ampicillin  IVPB 2Gram(s) IV Intermittent every 4 hours  cefepime  IVPB 2000milliGRAM(s) IV Intermittent every 8 hours  vancomycin  IVPB 1500milliGRAM(s) IV Intermittent every 12 hours  levothyroxine Injectable 50MICROGram(s) IV Push daily  acyclovir IVPB 950milliGRAM(s) IV Intermittent every 8 hours  acyclovir IVPB  IV Intermittent   insulin lispro (HumaLOG) corrective regimen sliding scale  SubCutaneous every 4 hours  amLODIPine   Tablet 10milliGRAM(s) Oral daily  levETIRAcetam  IVPB 500milliGRAM(s) IV Intermittent every 12 hours  sodium chloride 0.9%. 1000milliLiter(s) IV Continuous <Continuous>    MEDICATIONS  (PRN):  acetaminophen    Suspension 650milliGRAM(s) Oral every 6 hours PRN For Temp greater than 38 C (100.4 F)      ALLERGIES:  Allergies    No Known Allergies    Intolerances        LABS:                        11.7   12.5  )-----------( 201      ( 06 Mar 2017 05:52 )             34.2     06 Mar 2017 05:51    141    |  111    |  19     ----------------------------<  115    5.5     |  24     |  1.17     Ca    7.8        06 Mar 2017 05:51  Phos  2.4       06 Mar 2017 05:51  Mg     2.3       06 Mar 2017 05:51    TPro  5.8    /  Alb  2.0    /  TBili  1.1    /  DBili  x      /  AST  88     /  ALT  CANCELLED anable to perform due to absorbance error.  notified:  jean-paul ingram rn 17 06:49 jh  /  AlkPhos  25     06 Mar 2017 05:51    PT/INR - ( 04 Mar 2017 13:49 )   PT: 11.0 sec;   INR: 0.99          PTT - ( 04 Mar 2017 13:49 )  PTT:32.7 sec  Urinalysis Basic - ( 04 Mar 2017 15:05 )    Color: Yellow / Appearance: Clear / S.020 / pH: x  Gluc: x / Ketone: 40 mg/dL  / Bili: NEGATIVE / Urobili: 0.2 E.U./dL   Blood: x / Protein: >=300 mg/dL / Nitrite: NEGATIVE   Leuk Esterase: NEGATIVE / RBC: < 5 /HPF / WBC < 5 /HPF   Sq Epi: x / Non Sq Epi: Few /HPF / Bacteria: Present /HPF        RADIOLOGY & ADDITIONAL TESTS: Reviewed. INTERVAL HPI/OVERNIGHT EVENTS:    SUBJECTIVE: Patient seen and examined at bedside.     ROS: sedated and intubated. unable to assess.    OBJECTIVE:    VITAL SIGNS:  ICU Vital Signs Last 24 Hrs  T(C): 37, Max: 38.6 ( @ 12:00)  T(F): 98.6, Max: 101.4 ( @ 12:00)  HR: 76 (64 - 102)  BP: 167/92 (131/74 - 182/92)  BP(mean): 123 (93 - 131)  ABP: --  ABP(mean): --  RR: 24 (7 - 26)  SpO2: 100% (97% - 100%)    Mode: AC/ CMV (Assist Control/ Continuous Mandatory Ventilation), RR (machine): 18, TV (machine): 570, FiO2: 40, PEEP: 5, ITime: 1, MAP: 8.5, PIP: 16    I & Os for current day (as of  @ 07:00)  =============================================  IN: 5267.2 ml / OUT: 1480 ml / NET: 3787.2 ml    CAPILLARY BLOOD GLUCOSE  166 (06 Mar 2017 02:00)      PHYSICAL EXAM:    General: NAD  HEENT: NC/AT; PERRL, clear conjunctiva  Neck: supple  Respiratory: CTA b/l  Cardiovascular: +S1/S2; RRR  Abdomen: soft, NT/ND; +BS x4  Extremities: WWP, 2+ peripheral pulses b/l; no LE edema  Skin: normal color and turgor; no rash  Neurological:     MEDICATIONS:  MEDICATIONS  (STANDING):  propofol Infusion 5MICROgram(s)/kG/Min IV Continuous <Continuous>  heparin  Injectable 5000Unit(s) SubCutaneous every 8 hours  chlorhexidine 0.12% Liquid 15milliLiter(s) Swish and Spit two times a day  pantoprazole  Injectable 40milliGRAM(s) IV Push daily  ampicillin  IVPB 2Gram(s) IV Intermittent every 4 hours  cefepime  IVPB 2000milliGRAM(s) IV Intermittent every 8 hours  vancomycin  IVPB 1500milliGRAM(s) IV Intermittent every 12 hours  levothyroxine Injectable 50MICROGram(s) IV Push daily  acyclovir IVPB 950milliGRAM(s) IV Intermittent every 8 hours  acyclovir IVPB  IV Intermittent   insulin lispro (HumaLOG) corrective regimen sliding scale  SubCutaneous every 4 hours  amLODIPine   Tablet 10milliGRAM(s) Oral daily  levETIRAcetam  IVPB 500milliGRAM(s) IV Intermittent every 12 hours  sodium chloride 0.9%. 1000milliLiter(s) IV Continuous <Continuous>    MEDICATIONS  (PRN):  acetaminophen    Suspension 650milliGRAM(s) Oral every 6 hours PRN For Temp greater than 38 C (100.4 F)      ALLERGIES:  Allergies    No Known Allergies    Intolerances        LABS:                        11.7   12.5  )-----------( 201      ( 06 Mar 2017 05:52 )             34.2     06 Mar 2017 05:51    141    |  111    |  19     ----------------------------<  115    5.5     |  24     |  1.17     Ca    7.8        06 Mar 2017 05:51  Phos  2.4       06 Mar 2017 05:51  Mg     2.3       06 Mar 2017 05:51    TPro  5.8    /  Alb  2.0    /  TBili  1.1    /  DBili  x      /  AST  88     /  ALT  CANCELLED anable to perform due to absorbance error.  notified:  jean-paul ingram rn 17 06:49 jh  /  AlkPhos  25     06 Mar 2017 05:51    PT/INR - ( 04 Mar 2017 13:49 )   PT: 11.0 sec;   INR: 0.99          PTT - ( 04 Mar 2017 13:49 )  PTT:32.7 sec  Urinalysis Basic - ( 04 Mar 2017 15:05 )    Color: Yellow / Appearance: Clear / S.020 / pH: x  Gluc: x / Ketone: 40 mg/dL  / Bili: NEGATIVE / Urobili: 0.2 E.U./dL   Blood: x / Protein: >=300 mg/dL / Nitrite: NEGATIVE   Leuk Esterase: NEGATIVE / RBC: < 5 /HPF / WBC < 5 /HPF   Sq Epi: x / Non Sq Epi: Few /HPF / Bacteria: Present /HPF        RADIOLOGY & ADDITIONAL TESTS: Reviewed.      A/P: 51M PMH HTN, HLD, hypothyroidism and DM admitted to MICU w/ AMS intubated for airway protection.    NEURO: intubated  #AMS: unclear etiology. pt in severe sepsis w/ fever and rigors on admission, elevated WBCs, and AMS. CSF high in glucose, elevated protein 74, and w/ little WBCs present; possible encephalitis etiology. CT head showing diminished attenuation in L woody, CTA head done w/o evidence of critical stenosis, thus low likelihood of stroke. DKA also possibly contributing to AMS; UA and CXR w/o evidence of infection  -pt intubated and sedated in ED for GCS score of 7 on arrival and for protection of airway, stroke code called however AMS likely to other etiology per neurology.  -pt in DKA on admission, now bridged to Lantus  -currently on cefipine/vanc/ampicillin/acyclovir, will continue pending CSF testing  -general neuro consulted  -MRI head w/o contrast done today, f/u results  -EEG ordered  -check echocardiogram and MRI head, f/u urine and blood cultures.    ENDO:  #DKA w/ positive hydroxybutyrate and high AG  -bridged to Lantus  #Hypothyroid: levothyroxine IV    VASC:  #LLE ulceration w/ recent debridement and wound vac placement. lost to f/u  -vascular consulted - no intervention at this time  -put in for wound consult/wound care    FEN: NPO  PPX: Protonix 40mg IV qd, HSQ  Dispo: MICU monitoring  Code: FULL CODE INTERVAL HPI/OVERNIGHT EVENTS:    SUBJECTIVE: Patient seen and examined at bedside.     ROS: sedated and intubated. unable to assess.    OBJECTIVE:    VITAL SIGNS:  ICU Vital Signs Last 24 Hrs  T(C): 37, Max: 38.6 ( @ 12:00)  T(F): 98.6, Max: 101.4 ( @ 12:00)  HR: 76 (64 - 102)  BP: 167/92 (131/74 - 182/92)  BP(mean): 123 (93 - 131)  ABP: --  ABP(mean): --  RR: 24 (7 - 26)  SpO2: 100% (97% - 100%)    Mode: AC/ CMV (Assist Control/ Continuous Mandatory Ventilation), RR (machine): 18, TV (machine): 570, FiO2: 40, PEEP: 5, ITime: 1, MAP: 8.5, PIP: 16    I & Os for current day (as of  @ 07:00)  =============================================  IN: 5267.2 ml / OUT: 1480 ml / NET: 3787.2 ml    CAPILLARY BLOOD GLUCOSE  166 (06 Mar 2017 02:00)      PHYSICAL EXAM:    General: NAD  HEENT: NC/AT; PERRL, clear conjunctiva  Neck: no JVD  Respiratory: CTA b/l  Cardiovascular: +S1/S2; RRR, no M/R/G  Abdomen: soft, NT/ND; +BS x4  Extremities: WWP, no LE edema  Skin: circular macular lesions less erythematous today  Neurological: sedated, awakened by exam but started to pull at ETT, had to be put under more sedation    MEDICATIONS:  MEDICATIONS  (STANDING):  propofol Infusion 5MICROgram(s)/kG/Min IV Continuous <Continuous>  heparin  Injectable 5000Unit(s) SubCutaneous every 8 hours  chlorhexidine 0.12% Liquid 15milliLiter(s) Swish and Spit two times a day  pantoprazole  Injectable 40milliGRAM(s) IV Push daily  ampicillin  IVPB 2Gram(s) IV Intermittent every 4 hours  cefepime  IVPB 2000milliGRAM(s) IV Intermittent every 8 hours  vancomycin  IVPB 1500milliGRAM(s) IV Intermittent every 12 hours  levothyroxine Injectable 50MICROGram(s) IV Push daily  acyclovir IVPB 950milliGRAM(s) IV Intermittent every 8 hours  acyclovir IVPB  IV Intermittent   insulin lispro (HumaLOG) corrective regimen sliding scale  SubCutaneous every 4 hours  amLODIPine   Tablet 10milliGRAM(s) Oral daily  levETIRAcetam  IVPB 500milliGRAM(s) IV Intermittent every 12 hours  sodium chloride 0.9%. 1000milliLiter(s) IV Continuous <Continuous>    MEDICATIONS  (PRN):  acetaminophen    Suspension 650milliGRAM(s) Oral every 6 hours PRN For Temp greater than 38 C (100.4 F)      ALLERGIES:  Allergies    No Known Allergies    Intolerances        LABS:                        11.7   12.5  )-----------( 201      ( 06 Mar 2017 05:52 )             34.2     06 Mar 2017 05:51    141    |  111    |  19     ----------------------------<  115    5.5     |  24     |  1.17     Ca    7.8        06 Mar 2017 05:51  Phos  2.4       06 Mar 2017 05:51  Mg     2.3       06 Mar 2017 05:51    TPro  5.8    /  Alb  2.0    /  TBili  1.1    /  DBili  x      /  AST  88     /  ALT  CANCELLED anable to perform due to absorbance error.  notified:  jean-paul ingram rn 17 06:49 jh  /  AlkPhos  25     06 Mar 2017 05:51    PT/INR - ( 04 Mar 2017 13:49 )   PT: 11.0 sec;   INR: 0.99          PTT - ( 04 Mar 2017 13:49 )  PTT:32.7 sec  Urinalysis Basic - ( 04 Mar 2017 15:05 )    Color: Yellow / Appearance: Clear / S.020 / pH: x  Gluc: x / Ketone: 40 mg/dL  / Bili: NEGATIVE / Urobili: 0.2 E.U./dL   Blood: x / Protein: >=300 mg/dL / Nitrite: NEGATIVE   Leuk Esterase: NEGATIVE / RBC: < 5 /HPF / WBC < 5 /HPF   Sq Epi: x / Non Sq Epi: Few /HPF / Bacteria: Present /HPF        RADIOLOGY & ADDITIONAL TESTS: Reviewed.    1. Punctate areas of acute infarction within the right occipital cortex,   left thalamus medially, and just superior to the right splenium within   the periventricular white matter along the medial edge of the posterior   body of the right lateral ventricle. 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 Other etiologies   are not excluded.  2. Absence of CSF suppression in the frontoparietal convexity sulci   bilaterally, raises the possibility of meningitis especially given the   presence of mild diffuse ventriculomegaly, which is slightly out of   proportion to the degree of parenchymal volume loss. Brain MR with   contrast may be helpful for further evaluation, as failure of CSF   suppression may occur due to technical factors.      A/P: 51M PMH HTN, HLD, hypothyroidism, and uncontrolled DM admitted to MICU w/ AMS intubated for airway protection.    NEURO: intubated  #AMS: unclear etiology. pt on admission in severe sepsis w/ fever and rigors on admission, elevated WBCs, and AMS. CSF high in glucose, elevated protein 74, and w/ little WBCs present; possible encephalitis etiology. CT head showing diminished attenuation in L woody, CTA head done w/o evidence of critical stenosis, thus low likelihood of stroke. DKA (now resolved) also possibly contributing to AMS, pt more alert this AM; UA and CXR w/o evidence of infection. MRI read w/ various acute infarcts concerning for septic vs thrombotic emboli, also showing mild diffuse ventriculomegaly; CSF Cx/BCx/UCx NGTD  -pt intubated and sedated in ED for GCS score of 7 on arrival and for protection of airway, stroke code called however AMS likely to other etiology per neurology.  -pt in DKA on admission, now bridged to Lantus and off insulin gtt  -currently on vanc/acyclovir, pending cefipime approval, will continue pending CSF testing  -general neuro consulted  -EEG f/u  -f/u Echo    PULM:  -intubated for airway protection as pt w/ AMS  -does well on CPAP trials, to wean off sedation as tolerated and plan for future extubation    ENDO:  #DKA w/ positive hydroxybutyrate and high AG  -bridged to Lantus, did not receive Lantus as pt's FS wnl and NPO  -will exchange sump for NGT and start feeds today  -to dose Lantus for tonight, will f/u FS    #Hypothyroid: levothyroxine IV    VASC:  #LLE ulceration w/ recent debridement and wound vac placement. lost to f/u  -vascular consulted - no intervention at this time  -put in for wound consult/wound care    ID:  #AMS possibly 2/2 septic emboli (pt w/ recent staph infxn) vs encephalitis  -will continue antimicrobials as above  -f/u echo    #LLE ulceration w/ recent staph infxn w/ debridement and wound vac placement. salmon colored macular lesions now less erythematous likely erythema multiforme s/p staph infxn  -wound care consulted    FEN: NPO  PPX: Protonix 40mg IV qd, HSQ  Dispo: MICU monitoring  Code: FULL CODE INTERVAL HPI/OVERNIGHT EVENTS:    SUBJECTIVE: Patient seen and examined at bedside.     ROS: sedated and intubated. unable to assess.    OBJECTIVE:    VITAL SIGNS:  ICU Vital Signs Last 24 Hrs  T(C): 37, Max: 38.6 ( @ 12:00)  T(F): 98.6, Max: 101.4 ( @ 12:00)  HR: 76 (64 - 102)  BP: 167/92 (131/74 - 182/92)  BP(mean): 123 (93 - 131)  ABP: --  ABP(mean): --  RR: 24 (7 - 26)  SpO2: 100% (97% - 100%)    Mode: AC/ CMV (Assist Control/ Continuous Mandatory Ventilation), RR (machine): 18, TV (machine): 570, FiO2: 40, PEEP: 5, ITime: 1, MAP: 8.5, PIP: 16    I & Os for current day (as of  @ 07:00)  =============================================  IN: 5267.2 ml / OUT: 1480 ml / NET: 3787.2 ml    CAPILLARY BLOOD GLUCOSE  166 (06 Mar 2017 02:00)      PHYSICAL EXAM:    General: NAD  HEENT: NC/AT; PERRL, clear conjunctiva  Neck: no JVD  Respiratory: CTA b/l  Cardiovascular: +S1/S2; RRR, no M/R/G  Abdomen: soft, NT/ND; +BS x4  Extremities: WWP, no LE edema  Skin: circular macular lesions less erythematous today  Neurological: sedated, awakened by exam but started to pull at ETT, had to be put under more sedation    MEDICATIONS:  MEDICATIONS  (STANDING):  propofol Infusion 5MICROgram(s)/kG/Min IV Continuous <Continuous>  heparin  Injectable 5000Unit(s) SubCutaneous every 8 hours  chlorhexidine 0.12% Liquid 15milliLiter(s) Swish and Spit two times a day  pantoprazole  Injectable 40milliGRAM(s) IV Push daily  ampicillin  IVPB 2Gram(s) IV Intermittent every 4 hours  cefepime  IVPB 2000milliGRAM(s) IV Intermittent every 8 hours  vancomycin  IVPB 1500milliGRAM(s) IV Intermittent every 12 hours  levothyroxine Injectable 50MICROGram(s) IV Push daily  acyclovir IVPB 950milliGRAM(s) IV Intermittent every 8 hours  acyclovir IVPB  IV Intermittent   insulin lispro (HumaLOG) corrective regimen sliding scale  SubCutaneous every 4 hours  amLODIPine   Tablet 10milliGRAM(s) Oral daily  levETIRAcetam  IVPB 500milliGRAM(s) IV Intermittent every 12 hours  sodium chloride 0.9%. 1000milliLiter(s) IV Continuous <Continuous>    MEDICATIONS  (PRN):  acetaminophen    Suspension 650milliGRAM(s) Oral every 6 hours PRN For Temp greater than 38 C (100.4 F)      ALLERGIES:  Allergies    No Known Allergies    Intolerances        LABS:                        11.7   12.5  )-----------( 201      ( 06 Mar 2017 05:52 )             34.2     06 Mar 2017 05:51    141    |  111    |  19     ----------------------------<  115    5.5     |  24     |  1.17     Ca    7.8        06 Mar 2017 05:51  Phos  2.4       06 Mar 2017 05:51  Mg     2.3       06 Mar 2017 05:51    TPro  5.8    /  Alb  2.0    /  TBili  1.1    /  DBili  x      /  AST  88     /  ALT  CANCELLED anable to perform due to absorbance error.  notified:  jean-paul ingram rn 17 06:49 jh  /  AlkPhos  25     06 Mar 2017 05:51    PT/INR - ( 04 Mar 2017 13:49 )   PT: 11.0 sec;   INR: 0.99          PTT - ( 04 Mar 2017 13:49 )  PTT:32.7 sec  Urinalysis Basic - ( 04 Mar 2017 15:05 )    Color: Yellow / Appearance: Clear / S.020 / pH: x  Gluc: x / Ketone: 40 mg/dL  / Bili: NEGATIVE / Urobili: 0.2 E.U./dL   Blood: x / Protein: >=300 mg/dL / Nitrite: NEGATIVE   Leuk Esterase: NEGATIVE / RBC: < 5 /HPF / WBC < 5 /HPF   Sq Epi: x / Non Sq Epi: Few /HPF / Bacteria: Present /HPF        RADIOLOGY & ADDITIONAL TESTS: Reviewed.    1. Punctate areas of acute infarction within the right occipital cortex,   left thalamus medially, and just superior to the right splenium within   the periventricular white matter along the medial edge of the posterior   body of the right lateral ventricle. 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 Other etiologies   are not excluded.  2. Absence of CSF suppression in the frontoparietal convexity sulci   bilaterally, raises the possibility of meningitis especially given the   presence of mild diffuse ventriculomegaly, which is slightly out of   proportion to the degree of parenchymal volume loss. Brain MR with   contrast may be helpful for further evaluation, as failure of CSF   suppression may occur due to technical factors.      A/P: 51M PMH HTN, HLD, hypothyroidism, and uncontrolled DM admitted to MICU w/ AMS intubated for airway protection.    NEURO: intubated  #AMS: unclear etiology. pt on admission in severe sepsis w/ fever and rigors on admission, elevated WBCs, and AMS. CSF high in glucose, elevated protein 74, and w/ little WBCs present; possible encephalitis etiology. CT head showing diminished attenuation in L woody, CTA head done w/o evidence of critical stenosis, thus low likelihood of stroke. DKA (now resolved) also possibly contributing to AMS, pt more alert this AM; UA and CXR w/o evidence of infection. MRI read w/ various acute infarcts concerning for septic vs thrombotic emboli, also showing mild diffuse ventriculomegaly; CSF Cx/BCx/UCx NGTD  -pt intubated and sedated in ED for GCS score of 7 on arrival and for protection of airway, stroke code called however AMS likely to other etiology per neurology.  -pt in DKA on admission, now bridged to Lantus and off insulin gtt  -currently on vanc/acyclovir, cefepime switched to CTX, will continue pending CSF testing  -general neuro consulted  -EEG f/u  -f/u Echo    PULM:  -intubated for airway protection as pt w/ AMS  -does well on CPAP trials, to wean off sedation as tolerated and plan for future extubation    ENDO:  #DKA w/ positive hydroxybutyrate and high AG  -bridged to Lantus, did not receive Lantus as pt's FS wnl and NPO  -will exchange sump for NGT and start feeds today  -to dose Lantus for tonight, will f/u FS    #Hypothyroid: levothyroxine IV    VASC:  #LLE ulceration w/ recent debridement and wound vac placement. lost to f/u  -vascular consulted - no intervention at this time  -put in for wound consult/wound care    ID:  #AMS possibly 2/2 septic emboli (pt w/ recent staph infxn) vs encephalitis  -will continue antimicrobials as above  -f/u echo    #LLE ulceration w/ recent staph infxn w/ debridement and wound vac placement. salmon colored macular lesions now less erythematous likely erythema multiforme s/p staph infxn  -wound care consulted    FEN: NPO  PPX: Protonix 40mg IV qd, HSQ  Dispo: MICU monitoring  Code: FULL CODE

## 2017-03-06 NOTE — CONSULT NOTE ADULT - ASSESSMENT
50 yo M with  uncontrolled DM, HTN, hypothyroidism was BIBEMS admitted with multiple strokes, was empirically treated for meningitis but as per LP patient does not have meningitis. All blood cultures and CSF cultures negative to date. Currently, neuro symptoms have resolved and patient s/p extubation appears well. Unclear if any infectious etiology of AMS and multiple acute strokes. DDx includes encephalitis from ? viral vs. vasculitis. Team is concerned for septic emboli, wound is clear, no murmurs heard, blood cultures NTD; would require MAARI if suspicion is high.    -Agree with d/c Acyclovir as HSV negative  -Continue Vancomycy 1500mg Q12h for now  -D/c Cefepime, may give Ceftriaxone 2g IV Q12h instead  -Will continue to monitor fever curve; leukocytosis downtrended  -Will follow with you 52 yo M with  uncontrolled DM, HTN, hypothyroidism was BIBEMS admitted with multiple strokes, was empirically treated for meningitis but as per LP patient does not have meningitis. All blood cultures and CSF cultures negative to date. Currently, neuro symptoms have resolved and patient s/p extubation appears well. Unclear that etiology of AMS and multiple acute strokes are infectious. DDx includes encephalitis from ? viral (eg VZV) vs. vasculitis. Team is concerned for septic emboli, wound is clear, no murmurs heard, blood cultures NTD; would require AMARI if suspicion is high.    -Agree with d/c Acyclovir as HSV negative  -Continue Vancomycin 1500mg Q12h for now  -D/c Cefepime, may give Ceftriaxone 2g IV Q12h instead while cultures are pending.  -Will continue to monitor fever curve; leukocytosis downtrended  -Recommendations discussed with primary team.  Will follow with you

## 2017-03-06 NOTE — DIETITIAN INITIAL EVALUATION ADULT. - OTHER INFO
A/P: 51M PMH HTN, HLD, hypothyroidism, and uncontrolled DM admitted to MICU w/ AMS intubated for airway protection. NPO & intubated at time of visit.

## 2017-03-06 NOTE — PROGRESS NOTE ADULT - SUBJECTIVE AND OBJECTIVE BOX
CHIEF COMPLAINT:  AMS    INTERVAL HISTORY:  Since last seen by Dr Liu on 3/5/17, patient was titrated off of sedation and successfully extubated.    REVIEW OF SYSTEMS:  As per HPI, otherwise negative for Constitutional, Eyes, Ears/Nose/Mouth/Throat, Neck, Cardiovascular, Respiratory, Gastrointestinal, Genitourinary, Skin, Endocrine, Musculoskeletal, Psychiatric, and Hematologic/Lymphatic.    MEDICATIONS:  heparin  Injectable 5000Unit(s) SubCutaneous every 8 hours  chlorhexidine 0.12% Liquid 15milliLiter(s) Swish and Spit two times a day  pantoprazole  Injectable 40milliGRAM(s) IV Push daily  vancomycin  IVPB 1500milliGRAM(s) IV Intermittent every 12 hours  levothyroxine Injectable 50MICROGram(s) IV Push daily  acetaminophen    Suspension 650milliGRAM(s) Oral every 6 hours PRN  insulin lispro (HumaLOG) corrective regimen sliding scale  SubCutaneous every 4 hours  amLODIPine   Tablet 10milliGRAM(s) Oral daily  levETIRAcetam  IVPB 500milliGRAM(s) IV Intermittent every 12 hours  potassium chloride   Powder 40milliEquivalent(s) Oral once  cefTRIAXone   IVPB 2Gram(s) IV Intermittent every 12 hours  insulin glargine Injectable (LANTUS) 30Unit(s) SubCutaneous at bedtime  valsartan 320milliGRAM(s) Oral daily  hydrochlorothiazide   Tablet 25milliGRAM(s) Oral daily    VITAL SIGNS:  Vital Signs Last 24 Hrs  T(C): 36.6, Max: 37.8 (03-05 @ 21:45)  T(F): 97.9, Max: 100 (03-05 @ 21:45)  HR: 84 (64 - 97)  BP: 190/94 (131/74 - 191/92)  BP(mean): 135 (93 - 138)  RR: 28 (10 - 31)  SpO2: 97% (97% - 100%)    PHYSICAL EXAMINATION:  General: Well-developed, well nourished, in no acute distress.  Eyes: Conjunctiva and sclera clear.  Cardiovascular: Regular rate and rhythm; S1 and S2 Normal; No murmurs, gallops or rubs.  Neurologic:  - Mental Status:  Alert, awake, oriented to person, place, and time; Speech is fluent with intact naming, repetition, and comprehension; Immediate recall is 3/3 words and delayed recall is 3/3 words at 5 minutes; Able to spell WORLD backwards; Good overall fund of knowledge.  - Cranial Nerves II-XII:    II:  Visual acuity is 20/20 bilaterally; Visual fields are full to confrontation; Pupils are equal, round, and reactive to light.  III, IV, VI:  Extraocular movements are intact without nystagmus.  V:  Facial sensation is intact in the V1-V3 distribution bilaterally.  VII:  Face is symmetric with normal eye closure and smile  VIII:  Hearing is intact to finger rub.  IX, X:  Uvula is midline and soft palate rises symmetrically  XI:  Head turning and shoulder shrug are intact.  XII:  Tongue protrudes in the midline.  - Motor:  Strength is 5/5 throughout.  There is no pronator drift.  Normal muscle bulk and tone throughout.  - Reflexes:  2+ and symmetric at the biceps, brachioradialis, however areflexia noted in the R knee with hyperreflexia of the L knee.  Babinski upgoing on the L foot, downgoing on the R foot.  - Sensory:  Intact to light touch, vibration.  - Coordination:  Finger-nose-finger without dysmetria.  Rapid alternating hand movements intact.  - Gait:   Gait deferred     LABS:                          11.7   12.5  )-----------( 201      ( 06 Mar 2017 05:52 )             34.2     06 Mar 2017 10:23    143    |  111    |  17     ----------------------------<  145    3.0     |  23     |  1.27     Ca    7.5        06 Mar 2017 10:23  Phos  2.7       06 Mar 2017 10:23  Mg     2.1       06 Mar 2017 10:23    TPro  5.5    /  Alb  2.3    /  TBili  0.6    /  DBili  x      /  AST  23     /  ALT  14     /  AlkPhos  29     06 Mar 2017 10:23        RADIOLOGY & ADDITIONAL STUDIES:      MRI w/o contrast (3/4/2017):  FINDINGS:  There are foci of reduced diffusivity demonstrating hypointensity on the   ADC map consistent with areas of acute infarction within the right   occipital cortex, left thalamus medially, and just superior to the right   splenium within the periventricular white matter along the medial edge of   the posterior body of the right lateral ventricle.  There is abnormal T2 prolongation within the periventricular white matter   extending into the corona radiata as well as additional discrete foci   within the subcortical white matter of the frontal and parietal lobes   bilaterally. Findings are consistent with chronic microangiopathic   disease. There are lacunar infarcts noted within the thalami bilaterally,   anterior limb of left internal capsule, and right woody.  There is age-appropriate volume loss. However, there appears to be mild   diffuse ventriculomegaly, which is slightly out of proportion to the   degree of parenchymal volume loss that is present. This raises the   possibility of communicating hydrocephalus. Other etiologies include   central atrophy in addition to cortical atrophy and/or NPH in the   appropriate clinical setting.   There is no intracranial mass, extraaxial fluid collection, midline   shift, or focal mass effect. There is incomplete suppression of CSF   signal within the sulci of the frontoparietal convexity bilaterally of   uncertain significance. This can be artifactual. This can be seen in the   setting of meningitis.  There is mild prominence of the cisterna magna. Craniovertebral junction   is normal.  The sella turcica region is unremarkable. Tubing is noted at   the level of the nasopharynx may represent oroenteric and oral intubation   tubing. There is fluid noted in the nasopharynx. There is mucosal   thickening ethmoid air cells bilaterally, sphenoid sinus, frontal sinus,   left maxillary antrum. Mastoid air cells demonstrate good aeration.  IMPRESSION:    1. Punctate areas of acute infarction within the right occipital cortex,   left thalamus medially, and just superior to the right splenium within   the periventricular white matter along the medial edge of the posterior   body of the right lateral ventricle. 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 Other etiologies   are not excluded.  2. Absence of CSF suppression in the frontoparietal convexity sulci   bilaterally, raises the possibility of meningitis especially given the   presence of mild diffuse ventriculomegaly, which is slightly out of   proportion to the degree of parenchymal volume loss. Brain MR with   contrast may be helpful for further evaluation, as failure of CSF   suppression may occur due to technical factors.      IMPRESSION & PLAN:    51M with uncontrolled DM and HTN, presenting with AMS concerning for toxic metabolic vs infectious vs stroke related.     # Toxic Metabolic Syndrome   - Patient was noted to have multiple areas of punctate acute infarction which were also seen in the setting of apparent inflammation to the L hemisphere. Likely that the patient may have had 2 processes occurring leading to his presentation which included stroke, first process is a stroke as noted by the MRI, however location is not concerning      seizure activity 2/2 viral infection given the sudden onset of symptoms CHIEF COMPLAINT:  AMS    INTERVAL HISTORY:  Since last seen by Dr Liu on 3/5/17, patient was titrated off of sedation and successfully extubated.    REVIEW OF SYSTEMS:  As per HPI, otherwise negative for Constitutional, Eyes, Ears/Nose/Mouth/Throat, Neck, Cardiovascular, Respiratory, Gastrointestinal, Genitourinary, Skin, Endocrine, Musculoskeletal, Psychiatric, and Hematologic/Lymphatic.    MEDICATIONS:  heparin  Injectable 5000Unit(s) SubCutaneous every 8 hours  chlorhexidine 0.12% Liquid 15milliLiter(s) Swish and Spit two times a day  pantoprazole  Injectable 40milliGRAM(s) IV Push daily  vancomycin  IVPB 1500milliGRAM(s) IV Intermittent every 12 hours  levothyroxine Injectable 50MICROGram(s) IV Push daily  acetaminophen    Suspension 650milliGRAM(s) Oral every 6 hours PRN  insulin lispro (HumaLOG) corrective regimen sliding scale  SubCutaneous every 4 hours  amLODIPine   Tablet 10milliGRAM(s) Oral daily  levETIRAcetam  IVPB 500milliGRAM(s) IV Intermittent every 12 hours  potassium chloride   Powder 40milliEquivalent(s) Oral once  cefTRIAXone   IVPB 2Gram(s) IV Intermittent every 12 hours  insulin glargine Injectable (LANTUS) 30Unit(s) SubCutaneous at bedtime  valsartan 320milliGRAM(s) Oral daily  hydrochlorothiazide   Tablet 25milliGRAM(s) Oral daily    VITAL SIGNS:  Vital Signs Last 24 Hrs  T(C): 36.6, Max: 37.8 (03-05 @ 21:45)  T(F): 97.9, Max: 100 (03-05 @ 21:45)  HR: 84 (64 - 97)  BP: 190/94 (131/74 - 191/92)  BP(mean): 135 (93 - 138)  RR: 28 (10 - 31)  SpO2: 97% (97% - 100%)    PHYSICAL EXAMINATION:  General: Well-developed, well nourished, in no acute distress.  Eyes: Conjunctiva and sclera clear.  Cardiovascular: Regular rate and rhythm; S1 and S2 Normal; No murmurs, gallops or rubs.  Neurologic:  - Mental Status:  Alert, awake, oriented to person, place, and time; Speech is fluent with intact naming, repetition, and comprehension; Immediate recall is 3/3 words and delayed recall is 3/3 words at 5 minutes; Able to spell WORLD backwards; Good overall fund of knowledge.  - Cranial Nerves II-XII:    II:  Visual acuity is 20/20 bilaterally; Visual fields are full to confrontation; Pupils are equal, round, and reactive to light.  III, IV, VI:  Extraocular movements are intact without nystagmus.  V:  Facial sensation is intact in the V1-V3 distribution bilaterally.  VII:  Face is symmetric with normal eye closure and smile  VIII:  Hearing is intact to finger rub.  IX, X:  Uvula is midline and soft palate rises symmetrically  XI:  Head turning and shoulder shrug are intact.  XII:  Tongue protrudes in the midline.  - Motor:  Strength is 5/5 throughout.  There is no pronator drift.  Normal muscle bulk and tone throughout.  - Reflexes:  2+ and symmetric at the biceps, brachioradialis, however areflexia noted in the R knee with hyperreflexia of the L knee.  Babinski upgoing on the L foot, downgoing on the R foot.  - Sensory:  Intact to light touch, vibration.  - Coordination:  Finger-nose-finger without dysmetria.  Rapid alternating hand movements intact.  - Gait:   Gait deferred     LABS:                          11.7   12.5  )-----------( 201      ( 06 Mar 2017 05:52 )             34.2     06 Mar 2017 10:23    143    |  111    |  17     ----------------------------<  145    3.0     |  23     |  1.27     Ca    7.5        06 Mar 2017 10:23  Phos  2.7       06 Mar 2017 10:23  Mg     2.1       06 Mar 2017 10:23    TPro  5.5    /  Alb  2.3    /  TBili  0.6    /  DBili  x      /  AST  23     /  ALT  14     /  AlkPhos  29     06 Mar 2017 10:23        RADIOLOGY & ADDITIONAL STUDIES:      MRI w/o contrast (3/4/2017):  FINDINGS:  There are foci of reduced diffusivity demonstrating hypointensity on the   ADC map consistent with areas of acute infarction within the right   occipital cortex, left thalamus medially, and just superior to the right   splenium within the periventricular white matter along the medial edge of   the posterior body of the right lateral ventricle.  There is abnormal T2 prolongation within the periventricular white matter   extending into the corona radiata as well as additional discrete foci   within the subcortical white matter of the frontal and parietal lobes   bilaterally. Findings are consistent with chronic microangiopathic   disease. There are lacunar infarcts noted within the thalami bilaterally,   anterior limb of left internal capsule, and right woody.  There is age-appropriate volume loss. However, there appears to be mild   diffuse ventriculomegaly, which is slightly out of proportion to the   degree of parenchymal volume loss that is present. This raises the   possibility of communicating hydrocephalus. Other etiologies include   central atrophy in addition to cortical atrophy and/or NPH in the   appropriate clinical setting.   There is no intracranial mass, extraaxial fluid collection, midline   shift, or focal mass effect. There is incomplete suppression of CSF   signal within the sulci of the frontoparietal convexity bilaterally of   uncertain significance. This can be artifactual. This can be seen in the   setting of meningitis.  There is mild prominence of the cisterna magna. Craniovertebral junction   is normal.  The sella turcica region is unremarkable. Tubing is noted at   the level of the nasopharynx may represent oroenteric and oral intubation   tubing. There is fluid noted in the nasopharynx. There is mucosal   thickening ethmoid air cells bilaterally, sphenoid sinus, frontal sinus,   left maxillary antrum. Mastoid air cells demonstrate good aeration.  IMPRESSION:    1. Punctate areas of acute infarction within the right occipital cortex,   left thalamus medially, and just superior to the right splenium within   the periventricular white matter along the medial edge of the posterior   body of the right lateral ventricle. 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 Other etiologies   are not excluded.  2. Absence of CSF suppression in the frontoparietal convexity sulci   bilaterally, raises the possibility of meningitis especially given the   presence of mild diffuse ventriculomegaly, which is slightly out of   proportion to the degree of parenchymal volume loss. Brain MR with   contrast may be helpful for further evaluation, as failure of CSF   suppression may occur due to technical factors.      IMPRESSION & PLAN:    51M with uncontrolled DM and HTN, presenting with AMS concerning for toxic metabolic vs infectious vs stroke related.     # Toxic Metabolic Syndrome   - Patient was noted to have multiple areas of punctate acute infarction which were also seen in the setting of apparent inflammation to the L hemisphere. Likely that the patient may have had 2 processes occurring leading to his presentation which included stroke, first process is a stroke as noted by the MRI, however location would makes it likely that the patient would have been asymptomatic.   - Changes in the R hemisphere make it likely that the patient has a v  -iral infection, causing sudden onset of symptoms and rapid improvement. Infection could have lead to seizure activity which is why patient initially presented with a R gaze deviation with L hemiparesis.  - Patient noted to be improving on current regimen. Would continue as ordered. Can taper off antibiotics and antivirals as appropriate.   - Will continue to follow. CHIEF COMPLAINT:  AMS    INTERVAL HISTORY:  Since last seen by Dr Liu on 3/5/17, patient was titrated off of sedation and successfully extubated.    REVIEW OF SYSTEMS:  As per HPI, otherwise negative for Constitutional, Eyes, Ears/Nose/Mouth/Throat, Neck, Cardiovascular, Respiratory, Gastrointestinal, Genitourinary, Skin, Endocrine, Musculoskeletal, Psychiatric, and Hematologic/Lymphatic.    MEDICATIONS:  heparin  Injectable 5000Unit(s) SubCutaneous every 8 hours  chlorhexidine 0.12% Liquid 15milliLiter(s) Swish and Spit two times a day  pantoprazole  Injectable 40milliGRAM(s) IV Push daily  vancomycin  IVPB 1500milliGRAM(s) IV Intermittent every 12 hours  levothyroxine Injectable 50MICROGram(s) IV Push daily  acetaminophen    Suspension 650milliGRAM(s) Oral every 6 hours PRN  insulin lispro (HumaLOG) corrective regimen sliding scale  SubCutaneous every 4 hours  amLODIPine   Tablet 10milliGRAM(s) Oral daily  levETIRAcetam  IVPB 500milliGRAM(s) IV Intermittent every 12 hours  potassium chloride   Powder 40milliEquivalent(s) Oral once  cefTRIAXone   IVPB 2Gram(s) IV Intermittent every 12 hours  insulin glargine Injectable (LANTUS) 30Unit(s) SubCutaneous at bedtime  valsartan 320milliGRAM(s) Oral daily  hydrochlorothiazide   Tablet 25milliGRAM(s) Oral daily    VITAL SIGNS:  Vital Signs Last 24 Hrs  T(C): 36.6, Max: 37.8 (03-05 @ 21:45)  T(F): 97.9, Max: 100 (03-05 @ 21:45)  HR: 84 (64 - 97)  BP: 190/94 (131/74 - 191/92)  BP(mean): 135 (93 - 138)  RR: 28 (10 - 31)  SpO2: 97% (97% - 100%)    PHYSICAL EXAMINATION:  General: Well-developed, well nourished, in no acute distress.  Eyes: Conjunctiva and sclera clear.  Cardiovascular: Regular rate and rhythm; S1 and S2 Normal; No murmurs, gallops or rubs.  Neurologic:  - Mental Status:  Alert, awake, oriented to person, place, and time; Speech is fluent with intact naming, repetition, and comprehension; Immediate recall is 3/3 words and delayed recall is 3/3 words at 5 minutes; Able to spell WORLD backwards; Good overall fund of knowledge.  - Cranial Nerves II-XII:    II:  Visual acuity is 20/20 bilaterally; Visual fields are full to confrontation; Pupils are equal, round, and reactive to light.  III, IV, VI:  Extraocular movements are intact without nystagmus.  V:  Facial sensation is intact in the V1-V3 distribution bilaterally.  VII:  Face is symmetric with normal eye closure and smile  VIII:  Hearing is intact to finger rub.  IX, X:  Uvula is midline and soft palate rises symmetrically  XI:  Head turning and shoulder shrug are intact.  XII:  Tongue protrudes in the midline.  - Motor:  Strength is 5/5 throughout.  There is no pronator drift.  Normal muscle bulk and tone throughout.  - Reflexes:  2+ and symmetric at the biceps, brachioradialis, however areflexia noted in the R knee with hyperreflexia of the L knee.  Babinski upgoing on the L foot, downgoing on the R foot.  - Sensory:  Intact to light touch, vibration.  - Coordination:  Finger-nose-finger without dysmetria.  Rapid alternating hand movements intact.  - Gait:   Gait deferred     LABS:                          11.7   12.5  )-----------( 201      ( 06 Mar 2017 05:52 )             34.2     06 Mar 2017 10:23    143    |  111    |  17     ----------------------------<  145    3.0     |  23     |  1.27     Ca    7.5        06 Mar 2017 10:23  Phos  2.7       06 Mar 2017 10:23  Mg     2.1       06 Mar 2017 10:23    TPro  5.5    /  Alb  2.3    /  TBili  0.6    /  DBili  x      /  AST  23     /  ALT  14     /  AlkPhos  29     06 Mar 2017 10:23        RADIOLOGY & ADDITIONAL STUDIES:      MRI w/o contrast (3/4/2017):  FINDINGS:  There are foci of reduced diffusivity demonstrating hypointensity on the   ADC map consistent with areas of acute infarction within the right   occipital cortex, left thalamus medially, and just superior to the right   splenium within the periventricular white matter along the medial edge of   the posterior body of the right lateral ventricle.  There is abnormal T2 prolongation within the periventricular white matter   extending into the corona radiata as well as additional discrete foci   within the subcortical white matter of the frontal and parietal lobes   bilaterally. Findings are consistent with chronic microangiopathic   disease. There are lacunar infarcts noted within the thalami bilaterally,   anterior limb of left internal capsule, and right woody.  There is age-appropriate volume loss. However, there appears to be mild   diffuse ventriculomegaly, which is slightly out of proportion to the   degree of parenchymal volume loss that is present. This raises the   possibility of communicating hydrocephalus. Other etiologies include   central atrophy in addition to cortical atrophy and/or NPH in the   appropriate clinical setting.   There is no intracranial mass, extraaxial fluid collection, midline   shift, or focal mass effect. There is incomplete suppression of CSF   signal within the sulci of the frontoparietal convexity bilaterally of   uncertain significance. This can be artifactual. This can be seen in the   setting of meningitis.  There is mild prominence of the cisterna magna. Craniovertebral junction   is normal.  The sella turcica region is unremarkable. Tubing is noted at   the level of the nasopharynx may represent oroenteric and oral intubation   tubing. There is fluid noted in the nasopharynx. There is mucosal   thickening ethmoid air cells bilaterally, sphenoid sinus, frontal sinus,   left maxillary antrum. Mastoid air cells demonstrate good aeration.  IMPRESSION:    1. Punctate areas of acute infarction within the right occipital cortex,   left thalamus medially, and just superior to the right splenium within   the periventricular white matter along the medial edge of the posterior   body of the right lateral ventricle. 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 Other etiologies   are not excluded.  2. Absence of CSF suppression in the frontoparietal convexity sulci   bilaterally, raises the possibility of meningitis especially given the   presence of mild diffuse ventriculomegaly, which is slightly out of   proportion to the degree of parenchymal volume loss. Brain MR with   contrast may be helpful for further evaluation, as failure of CSF   suppression may occur due to technical factors.      IMPRESSION & PLAN:    51M with uncontrolled DM and HTN, presenting with AMS concerning for toxic metabolic vs infectious vs stroke related.     # Toxic Metabolic Syndrome   - Patient was noted to have multiple areas of punctate acute infarction which were also seen in the setting of apparent inflammation to the L hemisphere. Likely that the patient may have had 2 processes occurring leading to his presentation which included stroke, first process is a stroke as noted by the MRI, however location would makes it likely that the patient would have been asymptomatic.   - Changes in the R hemisphere make it likely that the patient has a v  -iral infection, causing sudden onset of symptoms and rapid improvement. Infection could have lead to seizure activity which is why patient initially presented with a R gaze deviation with L hemiparesis.  - Patient noted to be improving on current regimen. Would continue as ordered. Can taper off antibiotics and antivirals as appropriate.   - Continue vEEG  - Will continue to follow. CHIEF COMPLAINT:  AMS    INTERVAL HISTORY:  Since last seen by Dr Liu on 3/5/17, patient was titrated off of sedation and successfully extubated.  He has no recollection of the events leading up to this admission.  He has no complaints.    REVIEW OF SYSTEMS:  As per HPI, otherwise negative for Constitutional, Eyes, Ears/Nose/Mouth/Throat, Neck, Cardiovascular, Respiratory, Gastrointestinal, Genitourinary, Skin, Endocrine, Musculoskeletal, Psychiatric, and Hematologic/Lymphatic.    MEDICATIONS:  heparin  Injectable 5000Unit(s) SubCutaneous every 8 hours  chlorhexidine 0.12% Liquid 15milliLiter(s) Swish and Spit two times a day  pantoprazole  Injectable 40milliGRAM(s) IV Push daily  vancomycin  IVPB 1500milliGRAM(s) IV Intermittent every 12 hours  levothyroxine Injectable 50MICROGram(s) IV Push daily  acetaminophen    Suspension 650milliGRAM(s) Oral every 6 hours PRN  insulin lispro (HumaLOG) corrective regimen sliding scale  SubCutaneous every 4 hours  amLODIPine   Tablet 10milliGRAM(s) Oral daily  levETIRAcetam  IVPB 500milliGRAM(s) IV Intermittent every 12 hours  potassium chloride   Powder 40milliEquivalent(s) Oral once  cefTRIAXone   IVPB 2Gram(s) IV Intermittent every 12 hours  insulin glargine Injectable (LANTUS) 30Unit(s) SubCutaneous at bedtime  valsartan 320milliGRAM(s) Oral daily  hydrochlorothiazide   Tablet 25milliGRAM(s) Oral daily    VITAL SIGNS:  Vital Signs Last 24 Hrs  T(C): 36.6, Max: 37.8 (03-05 @ 21:45)  T(F): 97.9, Max: 100 (03-05 @ 21:45)  HR: 84 (64 - 97)  BP: 190/94 (131/74 - 191/92)  BP(mean): 135 (93 - 138)  RR: 28 (10 - 31)  SpO2: 97% (97% - 100%)    PHYSICAL EXAMINATION:  General: Well-developed, well nourished, in no acute distress.  Eyes: Conjunctiva and sclera clear.  Cardiovascular: Regular rate and rhythm; S1 and S2 Normal; No murmurs, gallops or rubs.  Neurologic:  - Mental Status:  Alert, awake, oriented to person, place, and date; Speech is fluent with intact naming, repetition, and comprehension (few paraphasic errors); Immediate recall is 3/3 words and delayed recall is 2/3 words at 5 minutes; Unable to spell WORLD backwards.  - Cranial Nerves II-XII:    II:  Pupils are equal, round, and reactive to light.  III, IV, VI:  Extraocular movements are intact without nystagmus.  V:  Facial sensation is intact in the V1-V3 distribution bilaterally.  VII:  Face is symmetric with normal eye closure and smile  VIII:  Hearing is intact to finger rub.  IX, X:  Uvula is midline and soft palate rises symmetrically  XI:  Head turning and shoulder shrug are intact.  XII:  Tongue protrudes in the midline.  - Motor:  Strength is 5/5 throughout.  There is no pronator drift.  Normal muscle bulk and tone throughout.  - Reflexes:  2+ and symmetric at the biceps, brachioradialis, however, R knee 1+ with hyperreflexia of the L knee.  Babinski upgoing on the L foot, downgoing on the R foot.  - Sensory:  Intact to light touch, vibration.  - Coordination:  Finger-nose-finger without dysmetria.  Rapid alternating hand movements intact.  - Gait:   Gait deferred     LABS:                          11.7   12.5  )-----------( 201      ( 06 Mar 2017 05:52 )             34.2     143    |  111    |  17     ----------------------------<  145    3.0     |  23     |  1.27     Ca    7.5        06 Mar 2017 10:23  Phos  2.7       06 Mar 2017 10:23  Mg     2.1       06 Mar 2017 10:23    TPro  5.5    /  Alb  2.3    /  TBili  0.6    /  DBili  x      /  AST  23     /  ALT  14     /  AlkPhos  29     06 Mar 2017 10:23    RADIOLOGY & ADDITIONAL STUDIES:      MRI Brain w/o contrast (3/4/2017):  Punctate areas of acute infarction within the right occipital cortex, left thalamus medially, and just superior to the right splenium within the periventricular white matter along the medial edge of the posterior   body of the right lateral ventricle. Given chronic periventricular and subcortical microangiopathic disease with evidence of remote lacunar infarcts in the thalami bilaterally and anterior limb of left internal capsule, these areas of infarction may be on that basis. Other etiologies are not excluded.  2. Absence of CSF suppression in the frontoparietal convexity sulci bilaterally, raises the possibility of meningitis especially given the presence of mild diffuse ventriculomegaly, which is slightly out of proportion to the degree of parenchymal volume loss. Brain MR with contrast may be helpful for further evaluation, as failure of CSF suppression may occur due to technical factors.    IMPRESSION & PLAN:  Mr. Avitia is a 51-year-old man with uncontrolled DM and HTN, presenting with AMS concerning for toxic/metabolic vs infectious vs vascular etiology.    # Toxic Metabolic Syndrome   - Patient was noted to have multiple areas of punctate acute infarction which were also seen in the setting of apparent inflammation to the L hemisphere. Likely that the patient may have had 2 processes occurring leading to his presentation which included stroke, first process is a stroke as noted by the MRI, however location would makes it likely that the patient would have been asymptomatic.   - Changes in the R hemisphere make it likely that the patient has encephalitis, causing sudden onset of symptoms with rapid improvement. Infection could have lead to seizure activity which is why patient initially presented with a R gaze deviation with L hemiparesis.  - Patient noted to be improving on current regimen. Would continue as ordered. Can taper off antibiotics and antivirals as appropriate.   - Continue vEEG  - Recommend MRI brain with and without contrast to further evaluate abnormalities seen on prior brain MRI  - Will continue to follow.

## 2017-03-06 NOTE — CONSULT NOTE ADULT - ATTENDING COMMENTS
Patient seen and examined.  Agree with above.  Extended course with initial onset of symptoms last night but worsening over the night.  Even though his fiance told someone that he had some left facial droop and Dr. Olivares noted left hemiparesis with gaze deviation prior to intubation, no obvious localizing findings during stroke code or now.  He's been febrile with high lactate and qualified for sepsis.  He was given IVF, etc and admitted for MICU care.  LP was performed to rule out meningitis given the story of vomiting overnight, etc.  Opening pressure was reportedly in the 30's (abnormally high).  On propofol now but also on cooling blanket.    O: General - lying in bed, intubated  Neuro -   MS - sedated on propofol with no response to voice but moves his body when apply pain  CN - eyes midline but negative oculocephalic, PERRL+, hard to assess facial secondary to ETT  Motor - spontaneous movement of his legs noted, + chills  Sensory - minimal response to pain to his extremites    Labs:                        15.0   17.9  )-----------( 287      ( 04 Mar 2017 19:35 )             41.1   04 Mar 2017 19:35    140    |  104    |  27     ----------------------------<  212    3.1     |  24     |  1.51     Ca    7.9        04 Mar 2017 19:35  Phos  2.3       04 Mar 2017 19:35  Mg     1.9       04 Mar 2017 19:35    TPro  6.7    /  Alb  3.0    /  TBili  0.7    /  DBili  x      /  AST  6      /  ALT  16     /  AlkPhos  50     04 Mar 2017 19:35   LP WBC 2, Prot 74, Gluc 230    Assessment - 51 year-old male presents with altered mental status in setting of fever, vomiting and sepsis.  Unclear if had specific neurologic deficits earlier but they may have been exacerbations of older deficits in the setting of sepsis.  Plan:  1) Treat underlying sepsis as per MICU  2) Follow up LP results.  3) Continue antibiotics in case of meningitis.  4) MRI Brain without mulu when patient is stable to rule out underlying cerebral etiology.
I have reviewed the medical record, including laboratory and radiographic studies, interviewed and examined the patient and discussed the plan with Dr. Adams, the ID Resident.  Agree with above.  Though I do not understand the cause of his events, he does not have meningitis.  Cerebral infarcts can be a/c VZV and neuroborreliosis. However,  rapidity of neurologic recovery makes infectious etiology less likely.  He gives no h/o exposures for Lyme (no pets/woods) and season is wrong.  Can continue antibacterial therapy while cultures are pending but doubt foot is a source for bacteremia.  So far, blood cultures are negative, he has no murmur, TTE is unrevealing and ESR 12/CRP 1.19 all make embolic phenomena from endocarditis unlikely.  If concern remains, can do AMARI.  Will follow with you.
L shin wound healing well.  Small ~2x2 cm residual open area, granulating 100%.  No erythema, warmth or infection to suggest infection of the leg.  Continue daily dressing changes to LLE.  Further care as per MICU team.
The patient was seen and examined. Reviewed the case with the ER attending, pulmonary critical care fellow, and resident,. The patient has been treated for respiratory failure and metabolic encephalopathy will off angina sputum culture will be performed and consent was obtained from the family. Patient was started as above for meningitis

## 2017-03-07 DIAGNOSIS — Z29.9 ENCOUNTER FOR PROPHYLACTIC MEASURES, UNSPECIFIED: ICD-10-CM

## 2017-03-07 DIAGNOSIS — I10 ESSENTIAL (PRIMARY) HYPERTENSION: ICD-10-CM

## 2017-03-07 DIAGNOSIS — L97.929 NON-PRESSURE CHRONIC ULCER OF UNSPECIFIED PART OF LEFT LOWER LEG WITH UNSPECIFIED SEVERITY: ICD-10-CM

## 2017-03-07 LAB
ALBUMIN SERPL ELPH-MCNC: 2.3 G/DL — LOW (ref 3.4–5)
ALP SERPL-CCNC: 30 U/L — LOW (ref 40–120)
ALT FLD-CCNC: 15 U/L — SIGNIFICANT CHANGE UP (ref 12–42)
ANION GAP SERPL CALC-SCNC: 6 MMOL/L — LOW (ref 9–16)
AST SERPL-CCNC: 14 U/L — LOW (ref 15–37)
B BURGDOR DNA SPEC QL NAA+PROBE: NEGATIVE — SIGNIFICANT CHANGE UP
BASOPHILS NFR BLD AUTO: 0.3 % — SIGNIFICANT CHANGE UP (ref 0–2)
BILIRUB SERPL-MCNC: 0.7 MG/DL — SIGNIFICANT CHANGE UP (ref 0.2–1.2)
BUN SERPL-MCNC: 10 MG/DL — SIGNIFICANT CHANGE UP (ref 7–23)
CALCIUM SERPL-MCNC: 8.1 MG/DL — LOW (ref 8.5–10.5)
CHLORIDE SERPL-SCNC: 108 MMOL/L — SIGNIFICANT CHANGE UP (ref 96–108)
CHOLEST SERPL-MCNC: 159 MG/DL — SIGNIFICANT CHANGE UP
CO2 SERPL-SCNC: 28 MMOL/L — SIGNIFICANT CHANGE UP (ref 22–31)
CREAT SERPL-MCNC: 1.04 MG/DL — SIGNIFICANT CHANGE UP (ref 0.5–1.3)
EOSINOPHIL NFR BLD AUTO: 3.5 % — SIGNIFICANT CHANGE UP (ref 0–6)
GLUCOSE SERPL-MCNC: 153 MG/DL — HIGH (ref 70–99)
HCT VFR BLD CALC: 36 % — LOW (ref 39–50)
HDLC SERPL-MCNC: 32 MG/DL — LOW
HGB BLD-MCNC: 12.3 G/DL — LOW (ref 13–17)
LIPID PNL WITH DIRECT LDL SERPL: 88 MG/DL — SIGNIFICANT CHANGE UP
LYMPHOCYTES # BLD AUTO: 27.9 % — SIGNIFICANT CHANGE UP (ref 13–44)
MAGNESIUM SERPL-MCNC: 2.3 MG/DL — SIGNIFICANT CHANGE UP (ref 1.6–2.4)
MCHC RBC-ENTMCNC: 28 PG — SIGNIFICANT CHANGE UP (ref 27–34)
MCHC RBC-ENTMCNC: 34.2 G/DL — SIGNIFICANT CHANGE UP (ref 32–36)
MCV RBC AUTO: 81.8 FL — SIGNIFICANT CHANGE UP (ref 80–100)
MONOCYTES NFR BLD AUTO: 8.2 % — SIGNIFICANT CHANGE UP (ref 2–14)
NEUTROPHILS NFR BLD AUTO: 60.1 % — SIGNIFICANT CHANGE UP (ref 43–77)
PHOSPHATE SERPL-MCNC: 3.2 MG/DL — SIGNIFICANT CHANGE UP (ref 2.5–4.5)
PLATELET # BLD AUTO: 196 K/UL — SIGNIFICANT CHANGE UP (ref 150–400)
POTASSIUM SERPL-MCNC: 3.1 MMOL/L — LOW (ref 3.5–5.3)
POTASSIUM SERPL-SCNC: 3.1 MMOL/L — LOW (ref 3.5–5.3)
PROT SERPL-MCNC: 5.7 G/DL — LOW (ref 6.4–8.2)
RBC # BLD: 4.4 M/UL — SIGNIFICANT CHANGE UP (ref 4.2–5.8)
RBC # FLD: 12.1 % — SIGNIFICANT CHANGE UP (ref 10.3–16.9)
SODIUM SERPL-SCNC: 142 MMOL/L — SIGNIFICANT CHANGE UP (ref 135–145)
SOURCE VZV: SIGNIFICANT CHANGE UP
TOTAL CHOLESTEROL/HDL RATIO MEASUREMENT: 5 RATIO — HIGH
TRIGL SERPL-MCNC: 195 MG/DL — HIGH
VDRL CSF-TITR: NEGATIVE — SIGNIFICANT CHANGE UP
VZV DNA CSF NAA+PROBE-LOG#: SIGNIFICANT CHANGE UP
VZV PCR: SIGNIFICANT CHANGE UP
WBC # BLD: 8.7 K/UL — SIGNIFICANT CHANGE UP (ref 3.8–10.5)
WBC # FLD AUTO: 8.7 K/UL — SIGNIFICANT CHANGE UP (ref 3.8–10.5)

## 2017-03-07 PROCEDURE — 99232 SBSQ HOSP IP/OBS MODERATE 35: CPT | Mod: GC

## 2017-03-07 PROCEDURE — 99233 SBSQ HOSP IP/OBS HIGH 50: CPT | Mod: GC

## 2017-03-07 PROCEDURE — 95951: CPT | Mod: 26

## 2017-03-07 PROCEDURE — 99232 SBSQ HOSP IP/OBS MODERATE 35: CPT

## 2017-03-07 RX ORDER — INSULIN GLARGINE 100 [IU]/ML
35 INJECTION, SOLUTION SUBCUTANEOUS AT BEDTIME
Qty: 0 | Refills: 0 | Status: DISCONTINUED | OUTPATIENT
Start: 2017-03-07 | End: 2017-03-07

## 2017-03-07 RX ORDER — ASPIRIN/CALCIUM CARB/MAGNESIUM 324 MG
81 TABLET ORAL DAILY
Qty: 0 | Refills: 0 | Status: DISCONTINUED | OUTPATIENT
Start: 2017-03-07 | End: 2017-03-12

## 2017-03-07 RX ORDER — HYDRALAZINE HCL 50 MG
10 TABLET ORAL ONCE
Qty: 0 | Refills: 0 | Status: COMPLETED | OUTPATIENT
Start: 2017-03-07 | End: 2017-03-07

## 2017-03-07 RX ORDER — ATORVASTATIN CALCIUM 80 MG/1
40 TABLET, FILM COATED ORAL AT BEDTIME
Qty: 0 | Refills: 0 | Status: DISCONTINUED | OUTPATIENT
Start: 2017-03-07 | End: 2017-03-12

## 2017-03-07 RX ORDER — CARVEDILOL PHOSPHATE 80 MG/1
6.25 CAPSULE, EXTENDED RELEASE ORAL EVERY 12 HOURS
Qty: 0 | Refills: 0 | Status: DISCONTINUED | OUTPATIENT
Start: 2017-03-07 | End: 2017-03-07

## 2017-03-07 RX ORDER — POTASSIUM CHLORIDE 20 MEQ
40 PACKET (EA) ORAL EVERY 4 HOURS
Qty: 0 | Refills: 0 | Status: COMPLETED | OUTPATIENT
Start: 2017-03-07 | End: 2017-03-07

## 2017-03-07 RX ORDER — INSULIN GLARGINE 100 [IU]/ML
32 INJECTION, SOLUTION SUBCUTANEOUS AT BEDTIME
Qty: 0 | Refills: 0 | Status: DISCONTINUED | OUTPATIENT
Start: 2017-03-07 | End: 2017-03-10

## 2017-03-07 RX ORDER — CARVEDILOL PHOSPHATE 80 MG/1
12.5 CAPSULE, EXTENDED RELEASE ORAL EVERY 12 HOURS
Qty: 0 | Refills: 0 | Status: DISCONTINUED | OUTPATIENT
Start: 2017-03-08 | End: 2017-03-11

## 2017-03-07 RX ADMIN — Medication 10 MILLIGRAM(S): at 18:52

## 2017-03-07 RX ADMIN — Medication 25 MILLIGRAM(S): at 10:46

## 2017-03-07 RX ADMIN — CARVEDILOL PHOSPHATE 6.25 MILLIGRAM(S): 80 CAPSULE, EXTENDED RELEASE ORAL at 15:31

## 2017-03-07 RX ADMIN — LEVETIRACETAM 420 MILLIGRAM(S): 250 TABLET, FILM COATED ORAL at 08:00

## 2017-03-07 RX ADMIN — Medication 6: at 14:51

## 2017-03-07 RX ADMIN — HEPARIN SODIUM 5000 UNIT(S): 5000 INJECTION INTRAVENOUS; SUBCUTANEOUS at 06:09

## 2017-03-07 RX ADMIN — HEPARIN SODIUM 5000 UNIT(S): 5000 INJECTION INTRAVENOUS; SUBCUTANEOUS at 22:45

## 2017-03-07 RX ADMIN — VALSARTAN 320 MILLIGRAM(S): 80 TABLET ORAL at 06:10

## 2017-03-07 RX ADMIN — Medication 40 MILLIEQUIVALENT(S): at 14:52

## 2017-03-07 RX ADMIN — Medication 40 MILLIEQUIVALENT(S): at 07:03

## 2017-03-07 RX ADMIN — HEPARIN SODIUM 5000 UNIT(S): 5000 INJECTION INTRAVENOUS; SUBCUTANEOUS at 14:51

## 2017-03-07 RX ADMIN — Medication 81 MILLIGRAM(S): at 16:42

## 2017-03-07 RX ADMIN — LEVETIRACETAM 420 MILLIGRAM(S): 250 TABLET, FILM COATED ORAL at 19:03

## 2017-03-07 RX ADMIN — Medication 40 MILLIEQUIVALENT(S): at 10:46

## 2017-03-07 RX ADMIN — CHLORHEXIDINE GLUCONATE 15 MILLILITER(S): 213 SOLUTION TOPICAL at 06:09

## 2017-03-07 RX ADMIN — Medication 4: at 10:56

## 2017-03-07 RX ADMIN — INSULIN GLARGINE 32 UNIT(S): 100 INJECTION, SOLUTION SUBCUTANEOUS at 22:46

## 2017-03-07 RX ADMIN — ATORVASTATIN CALCIUM 40 MILLIGRAM(S): 80 TABLET, FILM COATED ORAL at 22:45

## 2017-03-07 RX ADMIN — Medication 300 MILLIGRAM(S): at 04:08

## 2017-03-07 RX ADMIN — CEFTRIAXONE 100 GRAM(S): 500 INJECTION, POWDER, FOR SOLUTION INTRAMUSCULAR; INTRAVENOUS at 06:00

## 2017-03-07 RX ADMIN — AMLODIPINE BESYLATE 10 MILLIGRAM(S): 2.5 TABLET ORAL at 06:10

## 2017-03-07 RX ADMIN — Medication 4: at 18:00

## 2017-03-07 RX ADMIN — Medication 4: at 22:46

## 2017-03-07 NOTE — PROGRESS NOTE ADULT - SUBJECTIVE AND OBJECTIVE BOX
VEEG PRELIMINARY REPORT    Day 1: 3/6/2017 @ 8:24:07 AM [ Keppra 500mg BID]  Background:  predominantly theta-delta frequencies over the left hemisphere and relatively attenuated alpha- theta frequencies over the right which became faster throughout the recording.   Organization: the anterior-posterior voltage-frequency gradient was not well organized.   Posterior Dominant Rhythm: not well delineated.  N2 sleep: symmetric and synchronous sleep spindles and K-complexes present.  Spontaneous Activity:  1)	No epileptiform discharges present.  Events:  1)	No electrographic seizures occurred during this day  2)	No significant clinical events occurred during this day.  Provocations:  1)	HV/Photic: not performed    Artifacts: 01, 02, T3  electrode artifacts were present. Patient self-disconnected electrodes at 07:25.     Impression: These findings are indicative of moderate diffuse cerebral dysfunction, worse over the left hemisphere, which improved throughout the recording. No seizures, clinical events, or epileptiform discharges were captured.

## 2017-03-07 NOTE — PROGRESS NOTE ADULT - ATTENDING COMMENTS
the patient was seen and examined. I discussed the case in details with the pulmonary critical care follow and the medical resident. I reviewed the labs medication chest x-ray and clinical status. The plan is outlined as above.  I discussed the case with neurology. The patient is on pressure support ventilation tolerated well. Patient is still having fever and riders. Patient is still requiring sedatives to control his urinary status. The analysis of the CSF are negative today. Await serology and final culture. Patient is MRI of the head.  EEG is to be ordered. The patient was hemodynamically stable the blood pressure is controlled. Patient has adequate urine output and creatinine is normal. Continue on the current antimicrobial follow cultures
I have reviewed the medical record, including laboratory and radiographic studies, interviewed and examined the patient and discussed the plan with Dr. Duckworth, the ID Resident.  Agree with above.  Rapidity of recovery make viral encephalitis/bacterial meningitis or septic emboli from endocarditis unlikely.  Would follow off antibiotics.  Please recall if further ID input is desired.

## 2017-03-07 NOTE — PHYSICAL THERAPY INITIAL EVALUATION ADULT - PREDICTED DURATION OF THERAPY (DAYS/WKS), PT EVAL
Pt. will benefit from further PT follow up to improve gait, endurance , balance to facilitate return to functional independence.

## 2017-03-07 NOTE — PROGRESS NOTE ADULT - SUBJECTIVE AND OBJECTIVE BOX
O/N: Afebrile, no leukocytosis    S: "I feel like I'm back to normal"     ROS  General:No fever, no chills  Skin/Breast: no rash  Ophthalmologic:no vision changes  ENMT:no sore throat, no congestion  Respiratory and Thorax: no cough, no difficulty breathing  Cardiovascular: no chest pain, no palpitations  Gastrointestinal: no nausea, no diarrhea  Genitourinary: no dysuria  Musculoskeletal: no joint tenderness, no weakness  Neurological: no numbness, no confusion  Psychiatric: no change in mood  Hematology/Lymphatics: no easy bruising/bleeding  Allergic/Immunologic:	no allergies    MEDICATIONS  (STANDING):  heparin  Injectable 5000Unit(s) SubCutaneous every 8 hours  insulin lispro (HumaLOG) corrective regimen sliding scale  SubCutaneous every 4 hours  amLODIPine   Tablet 10milliGRAM(s) Oral daily  levETIRAcetam  IVPB 500milliGRAM(s) IV Intermittent every 12 hours  valsartan 320milliGRAM(s) Oral daily  levothyroxine 100MICROGram(s) Oral daily  carvedilol 6.25milliGRAM(s) Oral every 12 hours  insulin glargine Injectable (LANTUS) 32Unit(s) SubCutaneous at bedtime  atorvastatin 40milliGRAM(s) Oral at bedtime  aspirin  chewable 81milliGRAM(s) Oral daily      Vital Signs Last 24 Hrs  T(C): 36.5, Max: 37.2 (03-06 @ 21:47)  T(F): 97.7, Max: 99 (03-06 @ 21:47)  HR: 78 (68 - 104)  BP: 180/109 (165/91 - 195/90)  BP(mean): 129 (125 - 137)  RR: 16 (0 - 33)  SpO2: 98% (93% - 99%)    PHYSICAL EXAM:  Constitutional: Well-developed, well-nourished, in NAD  Eyes: normal conjunctivae  ENMT: NCAT, MMM  Neck: supple, no JVD  Respiratory: CTAB  Cardiovascular: RRR, no M/G/R  Gastrointestinal: soft, NTND, no organomegaly  Extremities: left anterior shin scab  Vascular: 2+ radial and DP pulses B/L  Neurological: grossly intact  Skin: no rash  Lymph Nodes: No cervical LAD  Musculoskeletal: ROM intact  Psychiatric: normal affect    LABS:                        12.3   8.7   )-----------( 196      ( 07 Mar 2017 06:03 )             36.0     07 Mar 2017 06:03    142    |  108    |  10     ----------------------------<  153    3.1     |  28     |  1.04     Ca    8.1        07 Mar 2017 06:03  Phos  3.2       07 Mar 2017 06:03  Mg     2.3       07 Mar 2017 06:03    TPro  5.7    /  Alb  2.3    /  TBili  0.7    /  DBili  x      /  AST  14     /  ALT  15     /  AlkPhos  30     07 Mar 2017 06:03      MICROBIOLOGY  RVP: neg  HIV: neg  CSF: no organisms or WBCs, no VZV, HSV    IMAGING  TTE 3/6: There is moderate concentric left ventricular hypertrophy.The left   ventricular   wall motion is normal.The left ventricular ejection fraction is   estimated to   be 65-70%The mitral inflow pattern is consistent with impaired left   ventricular relaxation with mildly elevated left atrial pressure   (8-14mmHg).   The left atrial size is normal.The left atrial volume index is 25 cc/m2   (normal <34cc/m2)Right atrial size is normal.The right ventricle is   normal in   size and function.No evidence for any hemodynamically significant   valvular   disease.No aortic root dilatation.The inferior vena cava is normal in   size   (<2.1 cm) with normal inspiratory collapse (>50%) consistent with normal   right   atrial pressure.  There is no pericardial effusion.    MRI head: 1. Punctate areas of acute infarction within the right occipital cortex,   left thalamus medially, and just superior to the right splenium within   the periventricular white matter along the medial edge of the posterior   body of the right lateral ventricle. 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 Other etiologies   are not excluded.  2. Absence of CSF suppression in the frontoparietal convexity sulci   bilaterally, raises the possibility of meningitis especially given the   presence of mild diffuse ventriculomegaly, which is slightly out of   proportion to the degree of parenchymal volume loss. Brain MR with   contrast may be helpful for further evaluation, as failure of CSF   suppression may occur due to technical factors.

## 2017-03-07 NOTE — PHYSICAL THERAPY INITIAL EVALUATION ADULT - ADDITIONAL COMMENTS
Pt. reports living on the ground floor with no steps to enter, denies prior use of assistive device.

## 2017-03-07 NOTE — PROGRESS NOTE ADULT - SUBJECTIVE AND OBJECTIVE BOX
TRANSFER ACCEPT NOTE:    Hospital Course: 51M PMH HTN, HLD, hypothyroidism, and uncontrolled DM started to have stroke like sx followed by AMS on 3/3 PM. Fiance noted at dinner L facial droop w/ slurred speech, L sided weakness, and unsteady gait followed by AMS then intractable vomiting and urinary/fecal incontinence. Recent hx of LLE wound staph infxn previously debrided w/ wound vac placement, lost to f/u. BIBEMS on 3/4 admitted to MICU w/ initial GCS of 7 and obtunded; pt w/ rigors, temp 102F and /180. Pt intubated for airway protection. Stroke code called CT head showing diffuse attenuation of L woody area but CTA head/neck neg for critical stenosis. Pt also found to be in DKA and started on insulin gtt, was able to be bridged to Lantus few hours after gtt initiation. Initiated empiric meningitis tx w/ cefepime/vanc/ampicillin/acyclovir. LP done w/ high opening pressure of 37, little to know WBCs, CSF glucose 230, CSF protein elevated at 74. Negative for bacterial meningitis, neg HSV. CSF Cx/UCx/BCx neg. RVP neg. HIV and HSV neg. Unable to perform Acid Fast on CSF. Antimicrobials de-escalated given findings but kept on CTX/vanc as per Neuro. MRI brain w/o contrast done as pt initially w/ [facility] (pre-renal, now resolved s/p IVF) found to have various acute infarcts concerning for septic vs thrombotic emboli, also showing mild diffuse ventriculomegaly. No vegetations noted on TTE. Neurology consulted, placed on EEG w/ no seizure activity to date. Plan for continued EEG monitoring and repeat MRI brain. Possible etiology includes encephalitis leading to seizure activity vs hypertensive emergency leading to acute infarcts seen on MRI. Awaiting repeat MRI for further evaluation. Stable for stepdown to [facility].        INTERVAL HPI/OVERNIGHT EVENTS:    VITAL SIGNS:  T(F): 97.7  HR: 79  BP: 173/94  RR: 17  SpO2: 97%  Wt(kg): --    PHYSICAL EXAM:    Constitutional: comfortable, NAD  HEENT: PERRLA, EOMI, MMM, no oral lesions  Respiratory: CTA b/l, no wheezes, rhonchi, or rales  Cardiovascular: RRR, normal S1,S2, no R/M/G  Gastrointestinal: non-distended, nontender, normoactive BS, no hepatosplenomegaly  :   Neurological: AAO x 3, follows all commands  Vascular: pulses 2+ b/l (radial, femoral, PT, DP)  Musculoskeletal: no joint swelling, no erythema  Extremities: WWP, no cyanosis, no clubbing    MEDICATIONS  (STANDING):  heparin  Injectable 5000Unit(s) SubCutaneous every 8 hours  vancomycin  IVPB 1500milliGRAM(s) IV Intermittent every 12 hours  insulin lispro (HumaLOG) corrective regimen sliding scale  SubCutaneous every 4 hours  amLODIPine   Tablet 10milliGRAM(s) Oral daily  levETIRAcetam  IVPB 500milliGRAM(s) IV Intermittent every 12 hours  cefTRIAXone   IVPB 2Gram(s) IV Intermittent every 12 hours  insulin glargine Injectable (LANTUS) 30Unit(s) SubCutaneous at bedtime  valsartan 320milliGRAM(s) Oral daily  levothyroxine 100MICROGram(s) Oral daily  metoprolol 25milliGRAM(s) Oral every 12 hours  potassium chloride    Tablet ER 40milliEquivalent(s) Oral every 4 hours    MEDICATIONS  (PRN):      Allergies    No Known Allergies    Intolerances        LABS:                        12.3   8.7   )-----------( 196      ( 07 Mar 2017 06:03 )             36.0     07 Mar 2017 06:03    142    |  108    |  10     ----------------------------<  153    3.1     |  28     |  1.04     Ca    8.1        07 Mar 2017 06:03  Phos  3.2       07 Mar 2017 06:03  Mg     2.3       07 Mar 2017 06:03    TPro  5.7    /  Alb  2.3    /  TBili  0.7    /  DBili  x      /  AST  14     /  ALT  15     /  AlkPhos  30     07 Mar 2017 06:03          RADIOLOGY & ADDITIONAL TESTS: TRANSFER ACCEPT NOTE:    Hospital Course: 51M PMH HTN, HLD, hypothyroidism, and uncontrolled DM started to have stroke like sx followed by AMS on 3/3 PM. Fiance noted at dinner L facial droop w/ slurred speech, L sided weakness, and unsteady gait followed by AMS then intractable vomiting and urinary/fecal incontinence. Recent hx of LLE wound staph infxn previously debrided w/ wound vac placement, lost to f/u. BIBEMS on 3/4 admitted to MICU w/ initial GCS of 7 and obtunded; pt w/ rigors, temp 102F and /180. Pt intubated for airway protection. Stroke code called CT head showing diffuse attenuation of L woody area but CTA head/neck neg for critical stenosis. Pt also found to be in DKA and started on insulin gtt, was able to be bridged to Lantus few hours after gtt initiation. Initiated empiric meningitis tx w/ cefepime/vanc/ampicillin/acyclovir. LP done w/ high opening pressure of 37, little to know WBCs, CSF glucose 230, CSF protein elevated at 74. Negative for bacterial meningitis, neg HSV. CSF Cx/UCx/BCx neg. RVP neg. HIV and HSV neg. Unable to perform Acid Fast on CSF. Antimicrobials de-escalated given findings but kept on CTX/vanc as per Neuro. MRI brain w/o contrast done as pt initially w/ [facility] (pre-renal, now resolved s/p IVF) found to have various acute infarcts concerning for septic vs thrombotic emboli, also showing mild diffuse ventriculomegaly. No vegetations noted on TTE. Neurology consulted, placed on EEG w/ no seizure activity to date. Plan for continued EEG monitoring and repeat MRI brain. Possible etiology includes encephalitis leading to seizure activity vs hypertensive emergency leading to acute infarcts seen on MRI. Awaiting repeat MRI for further evaluation. Stable for stepdown to RMF.        INTERVAL HPI/OVERNIGHT EVENTS: Patient seen and examined at bedside.     ROS:  CV: Denies chest pain  Resp: Denies SOB  GI: Denies abdominal pain, constipation, diarrhea, nausea, vomiting  : Denies dysuria  ID: Denies fevers, chills  MSK: Denies joint pain       VITAL SIGNS:  T(F): 97.7  HR: 79  BP: 173/94  RR: 17  SpO2: 97%  Wt(kg): --      PHYSICAL EXAM:    Constitutional: comfortable, NAD  HEENT: PERRLA, MMM, no oral lesions  Respiratory: CTA b/l, no wheezes, rhonchi, or rales  Cardiovascular: RRR, normal S1,S2, no R/M/G  Gastrointestinal: non-distended, nontender, normoactive BS, no hepatosplenomegaly  Neurological: AAO x 3, follows all commands  Vascular: peripheral pulses 2+ b/l   Extremities: WWP, RLE with gauze from previous cellulitis, no warmth or tenderness, no purulence, mild erythema. LLE with mild erythematous scabbed wound which pt's wife reports resulted from bumping in to the side of the bed, no purulence or warmth    MEDICATIONS  (STANDING):  heparin  Injectable 5000Unit(s) SubCutaneous every 8 hours  vancomycin  IVPB 1500milliGRAM(s) IV Intermittent every 12 hours  insulin lispro (HumaLOG) corrective regimen sliding scale  SubCutaneous every 4 hours  amLODIPine   Tablet 10milliGRAM(s) Oral daily  levETIRAcetam  IVPB 500milliGRAM(s) IV Intermittent every 12 hours  cefTRIAXone   IVPB 2Gram(s) IV Intermittent every 12 hours  insulin glargine Injectable (LANTUS) 30Unit(s) SubCutaneous at bedtime  valsartan 320milliGRAM(s) Oral daily  levothyroxine 100MICROGram(s) Oral daily  metoprolol 25milliGRAM(s) Oral every 12 hours  potassium chloride    Tablet ER 40milliEquivalent(s) Oral every 4 hours    MEDICATIONS  (PRN):      Allergies    No Known Allergies    Intolerances        LABS:                        12.3   8.7   )-----------( 196      ( 07 Mar 2017 06:03 )             36.0     07 Mar 2017 06:03    142    |  108    |  10     ----------------------------<  153    3.1     |  28     |  1.04     Ca    8.1        07 Mar 2017 06:03  Phos  3.2       07 Mar 2017 06:03  Mg     2.3       07 Mar 2017 06:03    TPro  5.7    /  Alb  2.3    /  TBili  0.7    /  DBili  x      /  AST  14     /  ALT  15     /  AlkPhos  30     07 Mar 2017 06:03          RADIOLOGY & ADDITIONAL TESTS:

## 2017-03-07 NOTE — PROGRESS NOTE ADULT - PROBLEM SELECTOR PLAN 3
HbA1C 11% on admission, with DKA now resolved. Pt required 10 units of coverage, -150 in the AM  - will redose Lantus to 32 units at bedtime, will start premeal depending on coverage

## 2017-03-07 NOTE — PROGRESS NOTE ADULT - ASSESSMENT
Patient is a 51 year old male with a PMHx of uncontrolled DM, HTN and hypothyroidism who was admitted for AMS, empirically treated for meningitis but work up negative. All blood cultures and CSF cultures negative to date. Currently, neuro symptoms have resolved and patient s/p extubation appears well. Unclear that etiology of AMS and multiple acute strokes are infectious. Team is concerned for septic emboli, wound is clear, no murmurs heard, blood cultures NTD; would require AMARI if suspicion is high.      -Infectious work up negative, no need for abx at this time  -Reconsult as needed.

## 2017-03-07 NOTE — PROGRESS NOTE ADULT - SUBJECTIVE AND OBJECTIVE BOX
NP note Neurosurgery Dr Calderon     51-year-old man with uncontrolled DM and HTN, presenting with AMS concerning for toxic/metabolic vs infectious vs vascular etiology.     punctate acute infarction which were also seen in the setting of apparent inflammation to the L       PHYSICAL EXAMINATION:  General: Well-developed, well nourished, in no acute distress.  Eyes: Conjunctiva and sclera clear.  Cardiovascular: Regular rate and rhythm; S1 and S2 Normal; No murmurs, gallops or rubs.  Neurologic:  - Mental Status:  Alert, awake, oriented to person, place, and date; Speech is fluent with intact naming, repetition, and comprehension (  - Cranial Nerves II-XII:    Motor antigravity  - Gait:   Gait deferred     LABS:                          11.7   12.5  )-----------( 201      ( 06 Mar 2017 05:52 )             34.2     143    |  111    |  17     ----------------------------<  145    3.0     |  23     |  1.27     Ca    7.5        06 Mar 2017 10:23  Phos  2.7       06 Mar 2017 10:23  Mg     2.1       06 Mar 2017 10:23    TPro  5.5    /  Alb  2.3    /  TBili  0.6    /  DBili  x      /  AST  23     /  ALT  14     /  AlkPhos  29     06 Mar 2017 10:23      [facility]  No [facility] intervention required  will sign off at this time , please reconsult if needed    Discussed with Dr Calderon    /Yassine edmonds

## 2017-03-07 NOTE — PHYSICAL THERAPY INITIAL EVALUATION ADULT - MODALITIES TREATMENT COMMENTS
Speech - fluent. Visual tracking - intact in 4 quadrants, gaze convergence -intact bilaterally. Peripheral vision - intact.

## 2017-03-07 NOTE — PROGRESS NOTE ADULT - PROBLEM SELECTOR PLAN 2
now resolved  -now on Lantus 30U qhs, to restart home humalog as needed Pt with persistent elevated BP (-180) Pt with persistent elevated BP (-180)  - HCTZ 50mg daily, valsartan 320mg daily, amlodipine 10mg daily, metoprolol to be switched to Coreg 6.25mg BID

## 2017-03-07 NOTE — PHYSICAL THERAPY INITIAL EVALUATION ADULT - PERTINENT HX OF CURRENT PROBLEM, REHAB EVAL
Pt. is a 51 y.o. male admitted with  gen. weakness, unsteadiness, slurred speech, progressing to nausea, vomiting, and subsequently to lethargy and rigors. Pt. was protectively intubated on admission. MRI revealed R occipital and L thalamic infarcts.

## 2017-03-07 NOTE — PROGRESS NOTE ADULT - ASSESSMENT
51M PMH HTN, HLD, hypothyroidism, and uncontrolled DM admitted to MICU w/ AMS intubated for airway protection. AMS resolved and extubated 3/6 PM.

## 2017-03-07 NOTE — PROGRESS NOTE ADULT - SUBJECTIVE AND OBJECTIVE BOX
INTERVAL HPI/OVERNIGHT EVENTS:    SUBJECTIVE: Patient seen and examined at bedside.     ROS:  CV: Denies chest pain  Resp: Denies SOB  GI: Denies abdominal pain, constipation, diarrhea, nausea, vomiting  : Denies dysuria  ID: Denies fevers, chills  MSK: Denies joint pain     OBJECTIVE:    VITAL SIGNS:  ICU Vital Signs Last 24 Hrs  T(C): 36.8, Max: 37.2 (03-06 @ 21:47)  T(F): 98.3, Max: 99 (03-06 @ 21:47)  HR: 68 (68 - 104)  BP: 170/94 (164/93 - 195/90)  BP(mean): 132 (120 - 138)  ABP: --  ABP(mean): --  RR: 23 (0 - 33)  SpO2: 94% (93% - 100%)    Mode: AC/ CMV (Assist Control/ Continuous Mandatory Ventilation), RR (machine): 18, TV (machine): 570, FiO2: 40, PEEP: 5, ITime: 1, MAP: 9, PIP: 18  I & Os for 24h ending 03-07 @ 07:00  =============================================  IN: 1468.4 ml / OUT: 4870 ml / NET: -3401.6 ml    I & Os for current day (as of 03-07 @ 08:19)  =============================================  IN: 250 ml / OUT: 300 ml / NET: -50 ml    CAPILLARY BLOOD GLUCOSE  123 (06 Mar 2017 06:00)      PHYSICAL EXAM:    General: NAD  HEENT: NC/AT; PERRL, clear conjunctiva  Neck: supple  Respiratory: CTA b/l  Cardiovascular: +S1/S2; RRR  Abdomen: soft, NT/ND; +BS x4  Extremities: WWP, 2+ peripheral pulses b/l; no LE edema  Skin: normal color and turgor; no rash  Neurological:     MEDICATIONS:  MEDICATIONS  (STANDING):  heparin  Injectable 5000Unit(s) SubCutaneous every 8 hours  chlorhexidine 0.12% Liquid 15milliLiter(s) Swish and Spit two times a day  vancomycin  IVPB 1500milliGRAM(s) IV Intermittent every 12 hours  insulin lispro (HumaLOG) corrective regimen sliding scale  SubCutaneous every 4 hours  amLODIPine   Tablet 10milliGRAM(s) Oral daily  levETIRAcetam  IVPB 500milliGRAM(s) IV Intermittent every 12 hours  cefTRIAXone   IVPB 2Gram(s) IV Intermittent every 12 hours  insulin glargine Injectable (LANTUS) 30Unit(s) SubCutaneous at bedtime  valsartan 320milliGRAM(s) Oral daily  hydrochlorothiazide   Tablet 25milliGRAM(s) Oral daily  levothyroxine 100MICROGram(s) Oral daily  metoprolol 25milliGRAM(s) Oral every 12 hours  potassium chloride    Tablet ER 40milliEquivalent(s) Oral every 4 hours    MEDICATIONS  (PRN):      ALLERGIES:  Allergies    No Known Allergies    Intolerances        LABS:                        12.3   8.7   )-----------( 196      ( 07 Mar 2017 06:03 )             36.0     07 Mar 2017 06:03    142    |  108    |  10     ----------------------------<  153    3.1     |  28     |  1.04     Ca    8.1        07 Mar 2017 06:03  Phos  3.2       07 Mar 2017 06:03  Mg     2.3       07 Mar 2017 06:03    TPro  5.7    /  Alb  2.3    /  TBili  0.7    /  DBili  x      /  AST  14     /  ALT  15     /  AlkPhos  30     07 Mar 2017 06:03          RADIOLOGY & ADDITIONAL TESTS: Reviewed.    A/P: 51M PMH HTN, HLD, hypothyroidism, and uncontrolled DM admitted to MICU w/ AMS intubated for airway protection.    NEURO: extubated 3/6 afternoon  #AMS: unclear etiology. pt on admission in severe sepsis w/ fever and rigors on admission, elevated WBCs, and AMS. CSF high in glucose, elevated protein 74, and w/ little WBCs present; possible encephalitis etiology. CT head showing diminished attenuation in L woody, CTA head done w/o evidence of critical stenosis, thus low likelihood of stroke. DKA (now resolved) also possibly contributing to AMS, pt more alert this AM; UA and CXR w/o evidence of infection. MRI read w/ various acute infarcts concerning for septic vs thrombotic emboli, also showing mild diffuse ventriculomegaly; CSF Cx/BCx/UCx NGTD  -pt intubated and sedated in ED for GCS score of 7 on arrival and for protection of airway, stroke code called however AMS likely to other etiology per neurology.  -pt in DKA on admission, now bridged to Lantus and off insulin gtt  -currently on vanc/acyclovir, cefepime switched to CTX, will continue pending CSF testing  -general neuro consulted  -EEG f/u  -f/u Echo    PULM:  -intubated for airway protection as pt w/ AMS  -does well on CPAP trials, to wean off sedation as tolerated and plan for future extubation    ENDO:  #DKA w/ positive hydroxybutyrate and high AG  -bridged to Lantus, did not receive Lantus as pt's FS wnl and NPO  -will exchange sump for NGT and start feeds today  -to dose Lantus for tonight, will f/u FS    #Hypothyroid: levothyroxine IV    VASC:  #LLE ulceration w/ recent debridement and wound vac placement. lost to f/u  -vascular consulted - no intervention at this time  -put in for wound consult/wound care    ID:  #AMS possibly 2/2 septic emboli (pt w/ recent staph infxn) vs encephalitis  -will continue antimicrobials as above  -f/u echo    #LLE ulceration w/ recent staph infxn w/ debridement and wound vac placement. salmon colored macular lesions now less erythematous likely erythema multiforme s/p staph infxn  -wound care consulted    FEN: NPO  PPX: Protonix 40mg IV qd, HSQ  Dispo: MICU monitoring  Code: FULL CODE TRANSFER NOTE:  Hospital Course:  51M PMH HTN, HLD, hypothyroidism, and uncontrolled DM admitted to MICU w/ AMS intubated for airway protection.    SUBJECTIVE: Patient seen and examined at bedside.     ROS:  CV: Denies chest pain  Resp: Denies SOB  GI: Denies abdominal pain, constipation, diarrhea, nausea, vomiting  : Denies dysuria  ID: Denies fevers, chills  MSK: Denies joint pain     OBJECTIVE:    VITAL SIGNS:  ICU Vital Signs Last 24 Hrs  T(C): 36.8, Max: 37.2 (03-06 @ 21:47)  T(F): 98.3, Max: 99 (03-06 @ 21:47)  HR: 68 (68 - 104)  BP: 170/94 (164/93 - 195/90)  BP(mean): 132 (120 - 138)  ABP: --  ABP(mean): --  RR: 23 (0 - 33)  SpO2: 94% (93% - 100%)    Mode: AC/ CMV (Assist Control/ Continuous Mandatory Ventilation), RR (machine): 18, TV (machine): 570, FiO2: 40, PEEP: 5, ITime: 1, MAP: 9, PIP: 18  I & Os for 24h ending 03-07 @ 07:00  =============================================  IN: 1468.4 ml / OUT: 4870 ml / NET: -3401.6 ml    I & Os for current day (as of 03-07 @ 08:19)  =============================================  IN: 250 ml / OUT: 300 ml / NET: -50 ml    CAPILLARY BLOOD GLUCOSE  123 (06 Mar 2017 06:00)      PHYSICAL EXAM:    General: NAD  HEENT: NC/AT; PERRL, clear conjunctiva  Neck: supple  Respiratory: CTA b/l  Cardiovascular: +S1/S2; RRR  Abdomen: soft, NT/ND; +BS x4  Extremities: WWP, 2+ peripheral pulses b/l; no LE edema  Skin: normal color and turgor; no rash  Neurological:     MEDICATIONS:  MEDICATIONS  (STANDING):  heparin  Injectable 5000Unit(s) SubCutaneous every 8 hours  chlorhexidine 0.12% Liquid 15milliLiter(s) Swish and Spit two times a day  vancomycin  IVPB 1500milliGRAM(s) IV Intermittent every 12 hours  insulin lispro (HumaLOG) corrective regimen sliding scale  SubCutaneous every 4 hours  amLODIPine   Tablet 10milliGRAM(s) Oral daily  levETIRAcetam  IVPB 500milliGRAM(s) IV Intermittent every 12 hours  cefTRIAXone   IVPB 2Gram(s) IV Intermittent every 12 hours  insulin glargine Injectable (LANTUS) 30Unit(s) SubCutaneous at bedtime  valsartan 320milliGRAM(s) Oral daily  hydrochlorothiazide   Tablet 25milliGRAM(s) Oral daily  levothyroxine 100MICROGram(s) Oral daily  metoprolol 25milliGRAM(s) Oral every 12 hours  potassium chloride    Tablet ER 40milliEquivalent(s) Oral every 4 hours    MEDICATIONS  (PRN):      ALLERGIES:  Allergies    No Known Allergies    Intolerances        LABS:                        12.3   8.7   )-----------( 196      ( 07 Mar 2017 06:03 )             36.0     07 Mar 2017 06:03    142    |  108    |  10     ----------------------------<  153    3.1     |  28     |  1.04     Ca    8.1        07 Mar 2017 06:03  Phos  3.2       07 Mar 2017 06:03  Mg     2.3       07 Mar 2017 06:03    TPro  5.7    /  Alb  2.3    /  TBili  0.7    /  DBili  x      /  AST  14     /  ALT  15     /  AlkPhos  30     07 Mar 2017 06:03          RADIOLOGY & ADDITIONAL TESTS: Reviewed.    A/P: 51M PMH HTN, HLD, hypothyroidism, and uncontrolled DM admitted to MICU w/ AMS intubated for airway protection.    NEURO: extubated 3/6 afternoon  #AMS: unclear etiology. pt on admission in severe sepsis w/ fever and rigors on admission, elevated WBCs, and AMS. CSF high in glucose, elevated protein 74, and w/ little WBCs present; possible encephalitis etiology. CT head showing diminished attenuation in L woody, CTA head done w/o evidence of critical stenosis, thus low likelihood of stroke. DKA (now resolved) also possibly contributing to AMS, pt more alert this AM; UA and CXR w/o evidence of infection. MRI read w/ various acute infarcts concerning for septic vs thrombotic emboli, also showing mild diffuse ventriculomegaly; CSF Cx/BCx/UCx NGTD  -pt intubated and sedated in ED for GCS score of 7 on arrival and for protection of airway, stroke code called however AMS likely to other etiology per neurology.  -pt in DKA on admission, now bridged to Lantus and off insulin gtt  -currently on vanc/acyclovir, cefepime switched to CTX, will continue pending CSF testing  -general neuro consulted  -EEG f/u  -f/u Echo    PULM:  -intubated for airway protection as pt w/ AMS  -does well on CPAP trials, to wean off sedation as tolerated and plan for future extubation    ENDO:  #DKA w/ positive hydroxybutyrate and high AG  -bridged to Lantus, did not receive Lantus as pt's FS wnl and NPO  -will exchange sump for NGT and start feeds today  -to dose Lantus for tonight, will f/u FS    #Hypothyroid: levothyroxine IV    VASC:  #LLE ulceration w/ recent debridement and wound vac placement. lost to f/u  -vascular consulted - no intervention at this time  -put in for wound consult/wound care    ID:  #AMS possibly 2/2 septic emboli (pt w/ recent staph infxn) vs encephalitis  -will continue antimicrobials as above  -f/u echo    #LLE ulceration w/ recent staph infxn w/ debridement and wound vac placement. salmon colored macular lesions now less erythematous likely erythema multiforme s/p staph infxn  -wound care consulted    FEN: NPO  PPX: Protonix 40mg IV qd, HSQ  Dispo: MICU monitoring  Code: FULL CODE TRANSFER NOTE:  Hospital Course:  51M PMH HTN, HLD, hypothyroidism, and uncontrolled DM started to have stroke like sx followed by AMS on 3/3 PM. Fiance noted at dinner L facial droop w/ slurred speech, L sided weakness, and unsteady gait followed by AMS then intractable vomiting and urinary/fecal incontinence. Recent hx of LLE wound staph infxn previously debrided w/ wound vac placement, lost to f/u. BIBEMS on 3/4 admitted to MICU w/ initial GCS of 7 and obtunded; pt w/ rigors, temp 102F and /180. Pt intubated for airway protection. Stroke code called CT head showing diffuse attenuation of L woody area but CTA head/neck neg for critical stenosis. Pt also found to be in DKA and started on insulin gtt, was able to be bridged to Lantus few hours after gtt initiation. LP done w/ high opening pressure of 37, little to know WBCs, CSF glucose 230, CSF protein elevated at 74. Negative for bacterial meningitis, neg HSV. CSF Cx/UCx/BCx neg. RVP neg. HIV and HSV neg. Unable to perform Acid Fast on CSF. MRI brain w/o contrast done as pt initially w/ [facility] (pre-renal, now resolved s/p IVF) found to have various acute infarcts concerning for septic vs thrombotic emboli, also showing mild diffuse ventriculomegaly. No vegetations noted on TTE. Neurology consulted, placed on EEG w/ no seizure activity to date.     SUBJECTIVE: Patient seen and examined at bedside.     ROS:  CV: Denies chest pain  Resp: Denies SOB  GI: Denies abdominal pain, constipation, diarrhea, nausea, vomiting  : Denies dysuria  ID: Denies fevers, chills  MSK: Denies joint pain     OBJECTIVE:    VITAL SIGNS:  ICU Vital Signs Last 24 Hrs  T(C): 36.8, Max: 37.2 (03-06 @ 21:47)  T(F): 98.3, Max: 99 (03-06 @ 21:47)  HR: 68 (68 - 104)  BP: 170/94 (164/93 - 195/90)  BP(mean): 132 (120 - 138)  ABP: --  ABP(mean): --  RR: 23 (0 - 33)  SpO2: 94% (93% - 100%)    Mode: AC/ CMV (Assist Control/ Continuous Mandatory Ventilation), RR (machine): 18, TV (machine): 570, FiO2: 40, PEEP: 5, ITime: 1, MAP: 9, PIP: 18  I & Os for 24h ending 03-07 @ 07:00  =============================================  IN: 1468.4 ml / OUT: 4870 ml / NET: -3401.6 ml    I & Os for current day (as of 03-07 @ 08:19)  =============================================  IN: 250 ml / OUT: 300 ml / NET: -50 ml    CAPILLARY BLOOD GLUCOSE  123 (06 Mar 2017 06:00)      PHYSICAL EXAM:    General: NAD  HEENT: NC/AT; PERRL, clear conjunctiva  Neck: supple  Respiratory: CTA b/l  Cardiovascular: +S1/S2; RRR  Abdomen: soft, NT/ND; +BS x4  Extremities: WWP, 2+ peripheral pulses b/l; no LE edema  Skin: normal color and turgor; no rash  Neurological:     MEDICATIONS:  MEDICATIONS  (STANDING):  heparin  Injectable 5000Unit(s) SubCutaneous every 8 hours  chlorhexidine 0.12% Liquid 15milliLiter(s) Swish and Spit two times a day  vancomycin  IVPB 1500milliGRAM(s) IV Intermittent every 12 hours  insulin lispro (HumaLOG) corrective regimen sliding scale  SubCutaneous every 4 hours  amLODIPine   Tablet 10milliGRAM(s) Oral daily  levETIRAcetam  IVPB 500milliGRAM(s) IV Intermittent every 12 hours  cefTRIAXone   IVPB 2Gram(s) IV Intermittent every 12 hours  insulin glargine Injectable (LANTUS) 30Unit(s) SubCutaneous at bedtime  valsartan 320milliGRAM(s) Oral daily  hydrochlorothiazide   Tablet 25milliGRAM(s) Oral daily  levothyroxine 100MICROGram(s) Oral daily  metoprolol 25milliGRAM(s) Oral every 12 hours  potassium chloride    Tablet ER 40milliEquivalent(s) Oral every 4 hours    MEDICATIONS  (PRN):      ALLERGIES:  Allergies    No Known Allergies    Intolerances        LABS:                        12.3   8.7   )-----------( 196      ( 07 Mar 2017 06:03 )             36.0     07 Mar 2017 06:03    142    |  108    |  10     ----------------------------<  153    3.1     |  28     |  1.04     Ca    8.1        07 Mar 2017 06:03  Phos  3.2       07 Mar 2017 06:03  Mg     2.3       07 Mar 2017 06:03    TPro  5.7    /  Alb  2.3    /  TBili  0.7    /  DBili  x      /  AST  14     /  ALT  15     /  AlkPhos  30     07 Mar 2017 06:03          RADIOLOGY & ADDITIONAL TESTS: Reviewed.    A/P: 51M PMH HTN, HLD, hypothyroidism, and uncontrolled DM admitted to MICU w/ AMS intubated for airway protection.    NEURO: extubated 3/6 afternoon  #AMS: unclear etiology. pt on admission in severe sepsis w/ fever and rigors on admission, elevated WBCs, and AMS. CSF high in glucose, elevated protein 74, and w/ little WBCs present; possible encephalitis etiology. CT head showing diminished attenuation in L woody, CTA head done w/o evidence of critical stenosis, thus low likelihood of stroke. DKA (now resolved) also possibly contributing to AMS, pt more alert this AM; UA and CXR w/o evidence of infection. MRI read w/ various acute infarcts concerning for septic vs thrombotic emboli, also showing mild diffuse ventriculomegaly; CSF Cx/BCx/UCx NGTD  -pt intubated and sedated in ED for GCS score of 7 on arrival and for protection of airway, stroke code called however AMS likely to other etiology per neurology.  -pt in DKA on admission, now bridged to Lantus and off insulin gtt  -currently on vanc/acyclovir, cefepime switched to CTX, will continue pending CSF testing  -general neuro consulted  -EEG f/u  -f/u Echo    PULM:  -intubated for airway protection as pt w/ AMS  -does well on CPAP trials, to wean off sedation as tolerated and plan for future extubation    ENDO:  #DKA w/ positive hydroxybutyrate and high AG  -bridged to Lantus, did not receive Lantus as pt's FS wnl and NPO  -will exchange sump for NGT and start feeds today  -to dose Lantus for tonight, will f/u FS    #Hypothyroid: levothyroxine IV    VASC:  #LLE ulceration w/ recent debridement and wound vac placement. lost to f/u  -vascular consulted - no intervention at this time  -put in for wound consult/wound care    ID:  #AMS possibly 2/2 septic emboli (pt w/ recent staph infxn) vs encephalitis  -will continue antimicrobials as above  -f/u echo    #LLE ulceration w/ recent staph infxn w/ debridement and wound vac placement. salmon colored macular lesions now less erythematous likely erythema multiforme s/p staph infxn  -wound care consulted    FEN: NPO  PPX: Protonix 40mg IV qd, HSQ  Dispo: MICU monitoring  Code: FULL CODE TRANSFER NOTE:  Hospital Course:  51M PMH HTN, HLD, hypothyroidism, and uncontrolled DM started to have stroke like sx followed by AMS on 3/3 PM. Fiance noted at dinner L facial droop w/ slurred speech, L sided weakness, and unsteady gait followed by AMS then intractable vomiting and urinary/fecal incontinence. Recent hx of LLE wound staph infxn previously debrided w/ wound vac placement, lost to f/u. BIBEMS on 3/4 admitted to MICU w/ initial GCS of 7 and obtunded; pt w/ rigors, temp 102F and /180. Pt intubated for airway protection. Stroke code called CT head showing diffuse attenuation of L woody area but CTA head/neck neg for critical stenosis. Pt also found to be in DKA and started on insulin gtt, was able to be bridged to Lantus few hours after gtt initiation. Initiated empiric meningitis tx w/ cefepime/vanc/ampicillin/acyclovir. LP done w/ high opening pressure of 37, little to know WBCs, CSF glucose 230, CSF protein elevated at 74. Negative for bacterial meningitis, neg HSV. CSF Cx/UCx/BCx neg. RVP neg. HIV and HSV neg. Unable to perform Acid Fast on CSF. Antimicrobials de-escalated given findings but kept on CTX/vanc as per Neuro. MRI brain w/o contrast done as pt initially w/ [facility] (pre-renal, now resolved s/p IVF) found to have various acute infarcts concerning for septic vs thrombotic emboli, also showing mild diffuse ventriculomegaly. No vegetations noted on TTE. Neurology consulted, placed on EEG w/ no seizure activity to date. Plan for continued EEG monitoring and repeat MRI brain. Possible etiology includes encephalitis leading to seizure activity vs hypertensive emergency leading to acute infarcts seen on MRI. Awaiting repeat MRI for further evaluation. Stable for stepdown to F.    SUBJECTIVE: Patient seen and examined at bedside.     ROS:  CV: Denies chest pain  Resp: Denies SOB  GI: Denies abdominal pain, constipation, diarrhea, nausea, vomiting  : Denies dysuria  ID: Denies fevers, chills  MSK: Denies joint pain     OBJECTIVE:    VITAL SIGNS:  ICU Vital Signs Last 24 Hrs  T(C): 36.8, Max: 37.2 (03-06 @ 21:47)  T(F): 98.3, Max: 99 (03-06 @ 21:47)  HR: 68 (68 - 104)  BP: 170/94 (164/93 - 195/90)  BP(mean): 132 (120 - 138)  ABP: --  ABP(mean): --  RR: 23 (0 - 33)  SpO2: 94% (93% - 100%)    Mode: AC/ CMV (Assist Control/ Continuous Mandatory Ventilation), RR (machine): 18, TV (machine): 570, FiO2: 40, PEEP: 5, ITime: 1, MAP: 9, PIP: 18  I & Os for 24h ending 03-07 @ 07:00  =============================================  IN: 1468.4 ml / OUT: 4870 ml / NET: -3401.6 ml    I & Os for current day (as of 03-07 @ 08:19)  =============================================  IN: 250 ml / OUT: 300 ml / NET: -50 ml    CAPILLARY BLOOD GLUCOSE  123 (06 Mar 2017 06:00)      PHYSICAL EXAM:    General: NAD  HEENT: NC/AT; PERRL, clear conjunctiva  Neck: supple  Respiratory: CTA b/l  Cardiovascular: +S1/S2; RRR  Abdomen: soft, NT/ND; +BS x4  Extremities: WWP, 2+ peripheral pulses b/l; no LE edema  Skin: normal color and turgor; no rash  Neurological:     MEDICATIONS:  MEDICATIONS  (STANDING):  heparin  Injectable 5000Unit(s) SubCutaneous every 8 hours  chlorhexidine 0.12% Liquid 15milliLiter(s) Swish and Spit two times a day  vancomycin  IVPB 1500milliGRAM(s) IV Intermittent every 12 hours  insulin lispro (HumaLOG) corrective regimen sliding scale  SubCutaneous every 4 hours  amLODIPine   Tablet 10milliGRAM(s) Oral daily  levETIRAcetam  IVPB 500milliGRAM(s) IV Intermittent every 12 hours  cefTRIAXone   IVPB 2Gram(s) IV Intermittent every 12 hours  insulin glargine Injectable (LANTUS) 30Unit(s) SubCutaneous at bedtime  valsartan 320milliGRAM(s) Oral daily  hydrochlorothiazide   Tablet 25milliGRAM(s) Oral daily  levothyroxine 100MICROGram(s) Oral daily  metoprolol 25milliGRAM(s) Oral every 12 hours  potassium chloride    Tablet ER 40milliEquivalent(s) Oral every 4 hours    MEDICATIONS  (PRN):      ALLERGIES:  Allergies    No Known Allergies    Intolerances        LABS:                        12.3   8.7   )-----------( 196      ( 07 Mar 2017 06:03 )             36.0     07 Mar 2017 06:03    142    |  108    |  10     ----------------------------<  153    3.1     |  28     |  1.04     Ca    8.1        07 Mar 2017 06:03  Phos  3.2       07 Mar 2017 06:03  Mg     2.3       07 Mar 2017 06:03    TPro  5.7    /  Alb  2.3    /  TBili  0.7    /  DBili  x      /  AST  14     /  ALT  15     /  AlkPhos  30     07 Mar 2017 06:03          RADIOLOGY & ADDITIONAL TESTS: Reviewed.    A/P: 51M PMH HTN, HLD, hypothyroidism, and uncontrolled DM admitted to MICU w/ AMS intubated for airway protection. AMS being w/u-ed w/ de-escalation of abx    NEURO: extubated 3/6 afternoon  #AMS: unclear etiology. pt on admission in severe sepsis w/ fever and rigors on admission, elevated WBCs, and AMS. CSF high in glucose, elevated protein 74, and w/ little WBCs present; possible encephalitis etiology. CT head showing diminished attenuation in L woody, CTA head done w/o evidence of critical stenosis, thus low likelihood of stroke. DKA (now resolved) also possibly contributing to AMS, pt more alert this AM; UA and CXR w/o evidence of infection. MRI read w/ various acute infarcts concerning for septic vs thrombotic emboli, also showing mild diffuse ventriculomegaly; CSF Cx/BCx/UCx NGTD  -pt intubated and sedated in ED for GCS score of 7 on arrival and for protection of airway, stroke code called however AMS likely to other etiology per neurology.  -pt in DKA on admission, now bridged to Lantus and off insulin gtt  -currently on vanc/acyclovir, cefepime switched to CTX, will continue pending CSF testing  -general neuro consulted  -EEG f/u  -f/u Echo    PULM:  -intubated for airway protection as pt w/ AMS  -does well on CPAP trials, to wean off sedation as tolerated and plan for future extubation    ENDO:  #DKA w/ positive hydroxybutyrate and high AG  -bridged to Lantus, did not receive Lantus as pt's FS wnl and NPO  -will exchange sump for NGT and start feeds today  -to dose Lantus for tonight, will f/u FS    #Hypothyroid: levothyroxine IV    VASC:  #LLE ulceration w/ recent debridement and wound vac placement. lost to f/u  -vascular consulted - no intervention at this time  -put in for wound consult/wound care    ID:  #AMS possibly 2/2 septic emboli (pt w/ recent staph infxn) vs encephalitis  -will continue antimicrobials as above  -f/u echo    #LLE ulceration w/ recent staph infxn w/ debridement and wound vac placement. salmon colored macular lesions now less erythematous likely erythema multiforme s/p staph infxn  -wound care consulted    FEN: NPO  PPX: Protonix 40mg IV qd, HSQ  Dispo: MICU monitoring  Code: FULL CODE TRANSFER NOTE:  Hospital Course:  51M PMH HTN, HLD, hypothyroidism, and uncontrolled DM started to have stroke like sx followed by AMS on 3/3 PM. Fiance noted at dinner L facial droop w/ slurred speech, L sided weakness, and unsteady gait followed by AMS then intractable vomiting and urinary/fecal incontinence. Recent hx of LLE wound staph infxn previously debrided w/ wound vac placement, lost to f/u. BIBEMS on 3/4 admitted to MICU w/ initial GCS of 7 and obtunded; pt w/ rigors, temp 102F and /180. Pt intubated for airway protection. Stroke code called CT head showing diffuse attenuation of L woody area but CTA head/neck neg for critical stenosis. Pt also found to be in DKA and started on insulin gtt, was able to be bridged to Lantus few hours after gtt initiation. Initiated empiric meningitis tx w/ cefepime/vanc/ampicillin/acyclovir. LP done w/ high opening pressure of 37, little to know WBCs, CSF glucose 230, CSF protein elevated at 74. Negative for bacterial meningitis, neg HSV. CSF Cx/UCx/BCx neg. RVP neg. HIV and HSV neg. Unable to perform Acid Fast on CSF. Antimicrobials de-escalated given findings but kept on CTX/vanc as per Neuro. MRI brain w/o contrast done as pt initially w/ BRINA (pre-renal, now resolved s/p IVF) found to have various acute infarcts concerning for septic vs thrombotic emboli, also showing mild diffuse ventriculomegaly. No vegetations noted on TTE. Neurology consulted, placed on EEG w/ no seizure activity to date. Plan for continued EEG monitoring and repeat MRI brain. Possible etiology includes encephalitis leading to seizure activity vs hypertensive emergency leading to acute infarcts seen on MRI. Awaiting repeat MRI for further evaluation. Stable for stepdown to [facility].    SUBJECTIVE: Patient seen and examined at bedside.     ROS:  CV: Denies chest pain  Resp: Denies SOB  GI: Denies abdominal pain, constipation, diarrhea, nausea, vomiting  : Denies dysuria  ID: Denies fevers, chills  MSK: Denies joint pain     OBJECTIVE:    VITAL SIGNS:  ICU Vital Signs Last 24 Hrs  T(C): 36.8, Max: 37.2 (03-06 @ 21:47)  T(F): 98.3, Max: 99 (03-06 @ 21:47)  HR: 68 (68 - 104)  BP: 170/94 (164/93 - 195/90)  BP(mean): 132 (120 - 138)  ABP: --  ABP(mean): --  RR: 23 (0 - 33)  SpO2: 94% (93% - 100%)    Mode: AC/ CMV (Assist Control/ Continuous Mandatory Ventilation), RR (machine): 18, TV (machine): 570, FiO2: 40, PEEP: 5, ITime: 1, MAP: 9, PIP: 18  I & Os for 24h ending 03-07 @ 07:00  =============================================  IN: 1468.4 ml / OUT: 4870 ml / NET: -3401.6 ml    I & Os for current day (as of 03-07 @ 08:19)  =============================================  IN: 250 ml / OUT: 300 ml / NET: -50 ml    CAPILLARY BLOOD GLUCOSE  123 (06 Mar 2017 06:00)      PHYSICAL EXAM:    General: NAD  HEENT: NC/AT; PERRL, clear conjunctiva  Neck: supple  Respiratory: CTA b/l  Cardiovascular: +S1/S2; RRR  Abdomen: soft, NT/ND; +BS x4  Extremities: WWP, 2+ peripheral pulses b/l; no LE edema  Skin: normal color and turgor; no rash  Neurological:     MEDICATIONS:  MEDICATIONS  (STANDING):  heparin  Injectable 5000Unit(s) SubCutaneous every 8 hours  chlorhexidine 0.12% Liquid 15milliLiter(s) Swish and Spit two times a day  vancomycin  IVPB 1500milliGRAM(s) IV Intermittent every 12 hours  insulin lispro (HumaLOG) corrective regimen sliding scale  SubCutaneous every 4 hours  amLODIPine   Tablet 10milliGRAM(s) Oral daily  levETIRAcetam  IVPB 500milliGRAM(s) IV Intermittent every 12 hours  cefTRIAXone   IVPB 2Gram(s) IV Intermittent every 12 hours  insulin glargine Injectable (LANTUS) 30Unit(s) SubCutaneous at bedtime  valsartan 320milliGRAM(s) Oral daily  hydrochlorothiazide   Tablet 25milliGRAM(s) Oral daily  levothyroxine 100MICROGram(s) Oral daily  metoprolol 25milliGRAM(s) Oral every 12 hours  potassium chloride    Tablet ER 40milliEquivalent(s) Oral every 4 hours    MEDICATIONS  (PRN):      ALLERGIES:  Allergies    No Known Allergies    Intolerances        LABS:                        12.3   8.7   )-----------( 196      ( 07 Mar 2017 06:03 )             36.0     07 Mar 2017 06:03    142    |  108    |  10     ----------------------------<  153    3.1     |  28     |  1.04     Ca    8.1        07 Mar 2017 06:03  Phos  3.2       07 Mar 2017 06:03  Mg     2.3       07 Mar 2017 06:03    TPro  5.7    /  Alb  2.3    /  TBili  0.7    /  DBili  x      /  AST  14     /  ALT  15     /  AlkPhos  30     07 Mar 2017 06:03          RADIOLOGY & ADDITIONAL TESTS: Reviewed.    A/P: 51M PMH HTN, HLD, hypothyroidism, and uncontrolled DM admitted to MICU w/ AMS intubated for airway protection. AMS resolved and extubated 3/6 PM. AAOx3 this AM w/ no neural deficits.    NEURO: extubated 3/6 afternoon  #AMS: resolved w/ no neural deficits on exam. unclear etiology. pt on admission in severe sepsis w/ fever and rigors on admission, elevated WBCs, and AMS. CSF high in glucose, elevated protein 74, and w/ little WBCs present; possible encephalitis etiology leading to seizure like activity vs hypertensive emergency w/ possible acute infarcts seen on MRI. CT head showing diminished attenuation in L woody, CTA head done w/o evidence of critical stenosis, thus low likelihood of stroke. DKA (now resolved) also possibly contributing to AMS, pt more alert this AM; UA and CXR w/o evidence of infection. MRI read w/ various acute infarcts concerning for septic vs thrombotic emboli, also showing mild diffuse ventriculomegaly; CSF Cx/BCx/UCx NGTD, TTE neg for vegetations. unlikely septic emboli given TTE findings and NGTD on BCx.  -pt intubated and sedated in ED for GCS score of 7 on arrival and for protection of airway - now extubated and mentating well  -pt in DKA on admission, now resolved  -currently on vanc/CTX, will continue pending CSF testing  -general neuro consulted  -EEG f/u - no seizure activity to date, will continue to monitor as per neuro  -ordered for repeat MRI brain w/ and w/o contrast for further eval    PULM: initially obtunded and in need of intubation on admission  -extubated and mentating well. no respiratory problems    ENDO:  #DKA w/ positive hydroxybutyrate and high AG  -now on Lantus 30U qhs, to restart home humalog as needed    #Hypothyroid: levothyroxine PO    VASC:  #LLE ulceration w/ recent debridement and wound vac placement. lost to f/u  -vascular consulted - no intervention at this time  -wound consult/wound care    ID:  #AMS possibly 2/2 septic emboli (pt w/ recent staph infxn) vs encephalitis  -will continue antimicrobials as above  -f/u echo    #LLE ulceration w/ recent staph infxn w/ debridement and wound vac placement. salmon colored macular lesions now less erythematous likely erythema multiforme s/p staph infxn  -wound care consulted    FEN: NPO  PPX: Protonix 40mg IV qd, HSQ  Dispo: MICU monitoring  Code: FULL CODE TRANSFER NOTE:  Hospital Course:  51M PMH HTN, HLD, hypothyroidism, and uncontrolled DM started to have stroke like sx followed by AMS on 3/3 PM. Fiance noted at dinner L facial droop w/ slurred speech, L sided weakness, and unsteady gait followed by AMS then intractable vomiting and urinary/fecal incontinence. Recent hx of LLE wound staph infxn previously debrided w/ wound vac placement, lost to f/u. BIBEMS on 3/4 admitted to MICU w/ initial GCS of 7 and obtunded; pt w/ rigors, temp 102F and /180. Pt intubated for airway protection. Stroke code called CT head showing diffuse attenuation of L woody area but CTA head/neck neg for critical stenosis. Pt also found to be in DKA and started on insulin gtt, was able to be bridged to Lantus few hours after gtt initiation. Initiated empiric meningitis tx w/ cefepime/vanc/ampicillin/acyclovir. LP done w/ high opening pressure of 37, little to know WBCs, CSF glucose 230, CSF protein elevated at 74. Negative for bacterial meningitis, neg HSV. CSF Cx/UCx/BCx neg. RVP neg. HIV and HSV neg. Unable to perform Acid Fast on CSF. Antimicrobials de-escalated given findings but kept on CTX/vanc as per Neuro. MRI brain w/o contrast done as pt initially w/ [facility] (pre-renal, now resolved s/p IVF) found to have various acute infarcts concerning for septic vs thrombotic emboli, also showing mild diffuse ventriculomegaly. No vegetations noted on TTE. Neurology consulted, placed on EEG w/ no seizure activity to date. Plan for continued EEG monitoring and repeat MRI brain. Possible etiology includes encephalitis leading to seizure activity vs hypertensive emergency leading to acute infarcts seen on MRI. Awaiting repeat MRI for further evaluation. Stable for stepdown to [facility].    SUBJECTIVE: Patient seen and examined at bedside.     ROS:  CV: Denies chest pain  Resp: Denies SOB  GI: Denies abdominal pain, constipation, diarrhea, nausea, vomiting  : Denies dysuria  ID: Denies fevers, chills  MSK: Denies joint pain     OBJECTIVE:    VITAL SIGNS:  ICU Vital Signs Last 24 Hrs  T(C): 36.8, Max: 37.2 (03-06 @ 21:47)  T(F): 98.3, Max: 99 (03-06 @ 21:47)  HR: 68 (68 - 104)  BP: 170/94 (164/93 - 195/90)  BP(mean): 132 (120 - 138)  ABP: --  ABP(mean): --  RR: 23 (0 - 33)  SpO2: 94% (93% - 100%)    Mode: AC/ CMV (Assist Control/ Continuous Mandatory Ventilation), RR (machine): 18, TV (machine): 570, FiO2: 40, PEEP: 5, ITime: 1, MAP: 9, PIP: 18  I & Os for 24h ending 03-07 @ 07:00  =============================================  IN: 1468.4 ml / OUT: 4870 ml / NET: -3401.6 ml    I & Os for current day (as of 03-07 @ 08:19)  =============================================  IN: 250 ml / OUT: 300 ml / NET: -50 ml    CAPILLARY BLOOD GLUCOSE  123 (06 Mar 2017 06:00)      PHYSICAL EXAM:    General: NAD  HEENT: NC/AT; PERRL, clear conjunctiva  Neck: supple  Respiratory: CTA b/l  Cardiovascular: +S1/S2; RRR  Abdomen: soft, NT/ND; +BS x4  Extremities: WWP, 2+ peripheral pulses b/l; no LE edema  Skin: normal color and turgor; no rash  Neurological:     MEDICATIONS:  MEDICATIONS  (STANDING):  heparin  Injectable 5000Unit(s) SubCutaneous every 8 hours  chlorhexidine 0.12% Liquid 15milliLiter(s) Swish and Spit two times a day  vancomycin  IVPB 1500milliGRAM(s) IV Intermittent every 12 hours  insulin lispro (HumaLOG) corrective regimen sliding scale  SubCutaneous every 4 hours  amLODIPine   Tablet 10milliGRAM(s) Oral daily  levETIRAcetam  IVPB 500milliGRAM(s) IV Intermittent every 12 hours  cefTRIAXone   IVPB 2Gram(s) IV Intermittent every 12 hours  insulin glargine Injectable (LANTUS) 30Unit(s) SubCutaneous at bedtime  valsartan 320milliGRAM(s) Oral daily  hydrochlorothiazide   Tablet 25milliGRAM(s) Oral daily  levothyroxine 100MICROGram(s) Oral daily  metoprolol 25milliGRAM(s) Oral every 12 hours  potassium chloride    Tablet ER 40milliEquivalent(s) Oral every 4 hours    MEDICATIONS  (PRN):      ALLERGIES:  Allergies    No Known Allergies    Intolerances        LABS:                        12.3   8.7   )-----------( 196      ( 07 Mar 2017 06:03 )             36.0     07 Mar 2017 06:03    142    |  108    |  10     ----------------------------<  153    3.1     |  28     |  1.04     Ca    8.1        07 Mar 2017 06:03  Phos  3.2       07 Mar 2017 06:03  Mg     2.3       07 Mar 2017 06:03    TPro  5.7    /  Alb  2.3    /  TBili  0.7    /  DBili  x      /  AST  14     /  ALT  15     /  AlkPhos  30     07 Mar 2017 06:03          RADIOLOGY & ADDITIONAL TESTS: Reviewed.    A/P: 51M PMH HTN, HLD, hypothyroidism, and uncontrolled DM admitted to MICU w/ AMS intubated for airway protection. AMS resolved and extubated 3/6 PM. AAOx3 this AM w/ no neural deficits.    NEURO: extubated 3/6 afternoon  #AMS: resolved w/ no neural deficits on exam. unclear etiology. pt on admission in severe sepsis w/ fever and rigors on admission, elevated WBCs, and AMS. CSF high in glucose, elevated protein 74, and w/ little WBCs present; possible encephalitis etiology leading to seizure like activity vs hypertensive emergency w/ possible acute infarcts seen on MRI. CT head showing diminished attenuation in L woody, CTA head done w/o evidence of critical stenosis, thus low likelihood of stroke. DKA (now resolved) also possibly contributing to AMS, pt more alert this AM; UA and CXR w/o evidence of infection. MRI read w/ various acute infarcts concerning for septic vs thrombotic emboli, also showing mild diffuse ventriculomegaly; CSF Cx/BCx/UCx NGTD, TTE neg for vegetations. unlikely septic emboli given TTE findings and NGTD on BCx.  -pt intubated and sedated in ED for GCS score of 7 on arrival and for protection of airway - now extubated and mentating well  -pt in DKA on admission, now resolved  -currently on vanc/CTX, will continue pending CSF testing  -general neuro consulted  -EEG f/u - no seizure activity to date, will continue to monitor as per neuro  -ordered for repeat MRI brain w/ and w/o contrast for further eval    PULM: initially obtunded and in need of intubation on admission  -extubated and mentating well. no respiratory problems    ENDO:  #DKA w/ positive hydroxybutyrate and high AG  -now on Lantus 30U qhs, to restart home humalog as needed    #Hypothyroid: levothyroxine PO    VASC:  #LLE ulceration w/ recent debridement and wound vac placement. lost to f/u  -vascular consulted - no intervention at this time  -wound consult/wound care    ID:  #AMS possibly 2/2 septic emboli (pt w/ recent staph infxn) vs encephalitis  -will continue antimicrobials as above  -f/u echo    #LLE ulceration w/ recent staph infxn w/ debridement and wound vac placement. salmon colored macular lesions now less erythematous likely erythema multiforme s/p staph infxn  -wound care consulted    FEN: DASH diet  PPX: Protonix 40mg IV qd, HSQ  Dispo: step down to [facility]  Code: FULL CODE TRANSFER NOTE:  Hospital Course:  51M PMH HTN, HLD, hypothyroidism, and uncontrolled DM started to have stroke like sx followed by AMS on 3/3 PM. Fiance noted at dinner L facial droop w/ slurred speech, L sided weakness, and unsteady gait followed by AMS then intractable vomiting and urinary/fecal incontinence. Recent hx of LLE wound staph infxn previously debrided w/ wound vac placement, lost to f/u. BIBEMS on 3/4 admitted to MICU w/ initial GCS of 7 and obtunded; pt w/ rigors, temp 102F and /180. Pt intubated for airway protection. Stroke code called CT head showing diffuse attenuation of L woody area but CTA head/neck neg for critical stenosis. Pt also found to be in DKA and started on insulin gtt, was able to be bridged to Lantus few hours after gtt initiation. Initiated empiric meningitis tx w/ cefepime/vanc/ampicillin/acyclovir. LP done w/ high opening pressure of 37, little to know WBCs, CSF glucose 230, CSF protein elevated at 74. Negative for bacterial meningitis, neg HSV. CSF Cx/UCx/BCx neg. RVP neg. HIV and HSV neg. Unable to perform Acid Fast on CSF. Antimicrobials de-escalated given findings but kept on CTX/vanc as per Neuro. MRI brain w/o contrast done as pt initially w/ [facility] (pre-renal, now resolved s/p IVF) found to have various acute infarcts concerning for septic vs thrombotic emboli, also showing mild diffuse ventriculomegaly. No vegetations noted on TTE. Neurology consulted, placed on EEG w/ no seizure activity to date. Plan for continued EEG monitoring and repeat MRI brain. Possible etiology includes encephalitis leading to seizure activity vs hypertensive emergency leading to acute infarcts seen on MRI. Awaiting repeat MRI for further evaluation. Stable for stepdown to F.    SUBJECTIVE: Patient seen and examined at bedside.     ROS:  CV: Denies chest pain  Resp: Denies SOB  GI: Denies abdominal pain, constipation, diarrhea, nausea, vomiting  : Denies dysuria  ID: Denies fevers, chills  MSK: Denies joint pain     OBJECTIVE:    VITAL SIGNS:  ICU Vital Signs Last 24 Hrs  T(C): 36.8, Max: 37.2 (03-06 @ 21:47)  T(F): 98.3, Max: 99 (03-06 @ 21:47)  HR: 68 (68 - 104)  BP: 170/94 (164/93 - 195/90)  BP(mean): 132 (120 - 138)  ABP: --  ABP(mean): --  RR: 23 (0 - 33)  SpO2: 94% (93% - 100%)    Mode: AC/ CMV (Assist Control/ Continuous Mandatory Ventilation), RR (machine): 18, TV (machine): 570, FiO2: 40, PEEP: 5, ITime: 1, MAP: 9, PIP: 18  I & Os for 24h ending 03-07 @ 07:00  =============================================  IN: 1468.4 ml / OUT: 4870 ml / NET: -3401.6 ml    I & Os for current day (as of 03-07 @ 08:19)  =============================================  IN: 250 ml / OUT: 300 ml / NET: -50 ml    CAPILLARY BLOOD GLUCOSE  123 (06 Mar 2017 06:00)      PHYSICAL EXAM:    General: NAD  HEENT: NC/AT; PERRL, clear conjunctiva  Neck: supple  Respiratory: CTA b/l  Cardiovascular: +S1/S2; RRR  Abdomen: soft, NT/ND; +BS x4  Extremities: WWP, 2+ peripheral pulses b/l; no LE edema  Skin: normal color and turgor; no rash  Neurological:     MEDICATIONS:  MEDICATIONS  (STANDING):  heparin  Injectable 5000Unit(s) SubCutaneous every 8 hours  chlorhexidine 0.12% Liquid 15milliLiter(s) Swish and Spit two times a day  vancomycin  IVPB 1500milliGRAM(s) IV Intermittent every 12 hours  insulin lispro (HumaLOG) corrective regimen sliding scale  SubCutaneous every 4 hours  amLODIPine   Tablet 10milliGRAM(s) Oral daily  levETIRAcetam  IVPB 500milliGRAM(s) IV Intermittent every 12 hours  cefTRIAXone   IVPB 2Gram(s) IV Intermittent every 12 hours  insulin glargine Injectable (LANTUS) 30Unit(s) SubCutaneous at bedtime  valsartan 320milliGRAM(s) Oral daily  hydrochlorothiazide   Tablet 25milliGRAM(s) Oral daily  levothyroxine 100MICROGram(s) Oral daily  metoprolol 25milliGRAM(s) Oral every 12 hours  potassium chloride    Tablet ER 40milliEquivalent(s) Oral every 4 hours    MEDICATIONS  (PRN):      ALLERGIES:  Allergies    No Known Allergies    Intolerances        LABS:                        12.3   8.7   )-----------( 196      ( 07 Mar 2017 06:03 )             36.0     07 Mar 2017 06:03    142    |  108    |  10     ----------------------------<  153    3.1     |  28     |  1.04     Ca    8.1        07 Mar 2017 06:03  Phos  3.2       07 Mar 2017 06:03  Mg     2.3       07 Mar 2017 06:03    TPro  5.7    /  Alb  2.3    /  TBili  0.7    /  DBili  x      /  AST  14     /  ALT  15     /  AlkPhos  30     07 Mar 2017 06:03          RADIOLOGY & ADDITIONAL TESTS: Reviewed.    A/P: 51M PMH HTN, HLD, hypothyroidism, and uncontrolled DM admitted to MICU w/ AMS intubated for airway protection. AMS resolved and extubated 3/6 PM. AAOx3 this AM w/ no neural deficits.    NEURO: extubated 3/6 afternoon  #AMS: resolved w/ no neural deficits on exam. unclear etiology. pt on admission in severe sepsis w/ fever and rigors on admission, elevated WBCs, and AMS. CSF high in glucose, elevated protein 74, and w/ little WBCs present; possible encephalitis etiology leading to seizure like activity vs hypertensive emergency w/ possible acute infarcts seen on MRI. CT head showing diminished attenuation in L woody, CTA head done w/o evidence of critical stenosis, thus low likelihood of stroke. DKA (now resolved) also possibly contributing to AMS, pt more alert this AM; UA and CXR w/o evidence of infection. MRI read w/ various acute infarcts concerning for septic vs thrombotic emboli, also showing mild diffuse ventriculomegaly; CSF Cx/BCx/UCx NGTD, TTE neg for vegetations. unlikely septic emboli given TTE findings and NGTD on BCx.  -pt intubated and sedated in ED for GCS score of 7 on arrival and for protection of airway - now extubated and mentating well  -pt in DKA on admission, now resolved  -currently on vanc/CTX, will continue pending CSF testing; to f/u w/ neuro and ID for need and duration of course  -general neuro consulted  -EEG f/u - no seizure activity to date, will continue to monitor as per neuro  -ordered for repeat MRI brain w/ and w/o contrast for further eval    PULM: initially obtunded and in need of intubation on admission  -extubated and mentating well. no respiratory problems    ENDO:  #DKA w/ positive hydroxybutyrate and high AG  -now on Lantus 30U qhs, to restart home humalog as needed    #Hypothyroid: levothyroxine PO    VASC:  #LLE ulceration w/ recent debridement and wound vac placement. lost to f/u  -vascular consulted - no intervention at this time  -wound consult/wound care    ID:  #AMS possibly 2/2 septic emboli (pt w/ recent staph infxn) vs encephalitis  -will continue antimicrobials as above  -f/u echo    #LLE ulceration w/ recent staph infxn w/ debridement and wound vac placement. salmon colored macular lesions now less erythematous likely erythema multiforme s/p staph infxn  -wound care consulted    FEN: DASH diet  PPX: Protonix 40mg IV qd, HSQ  Dispo: step down to [facility]  Code: FULL CODE TRANSFER NOTE:  Hospital Course:  51M PMH HTN, HLD, hypothyroidism, and uncontrolled DM started to have stroke like sx followed by AMS on 3/3 PM. Fiance noted at dinner L facial droop w/ slurred speech, L sided weakness, and unsteady gait followed by AMS then intractable vomiting and urinary/fecal incontinence. Recent hx of LLE wound staph infxn previously debrided w/ wound vac placement, lost to f/u. BIBEMS on 3/4 admitted to MICU w/ initial GCS of 7 and obtunded; pt w/ rigors, temp 102F and /180. Pt intubated for airway protection. Stroke code called CT head showing diffuse attenuation of L woody area but CTA head/neck neg for critical stenosis. Pt also found to be in DKA and started on insulin gtt, was able to be bridged to Lantus few hours after gtt initiation. Initiated empiric meningitis tx w/ cefepime/vanc/ampicillin/acyclovir. LP done w/ high opening pressure of 37, 2 WBCs, CSF glucose 230, CSF protein elevated at 74. Negative for bacterial meningitis, neg HSV. CSF Cx/UCx/BCx neg. RVP neg. HIV and HSV neg. Unable to perform Acid Fast on CSF. Antimicrobials de-escalated given findings but kept on CTX/vanc as per Neuro. MRI brain w/o contrast done as pt initially w/ [facility] (pre-renal, now resolved s/p IVF) found to have various acute infarcts concerning for septic vs thrombotic emboli, also showing mild diffuse ventriculomegaly. No vegetations noted on TTE. Neurology consulted, placed on EEG w/ no seizure activity to date. Plan for continued EEG monitoring and repeat MRI brain. Possible etiology includes encephalitis leading to seizure activity vs hypertensive emergency leading to acute infarcts seen on MRI. Awaiting repeat MRI for further evaluation. Stable for stepdown to [facility].    SUBJECTIVE: Patient seen and examined at bedside.     ROS:  CV: Denies chest pain  Resp: Denies SOB  GI: Denies abdominal pain, constipation, diarrhea, nausea, vomiting  : Denies dysuria  ID: Denies fevers, chills  MSK: Denies joint pain     OBJECTIVE:    VITAL SIGNS:  ICU Vital Signs Last 24 Hrs  T(C): 36.8, Max: 37.2 (03-06 @ 21:47)  T(F): 98.3, Max: 99 (03-06 @ 21:47)  HR: 68 (68 - 104)  BP: 170/94 (164/93 - 195/90)  BP(mean): 132 (120 - 138)  ABP: --  ABP(mean): --  RR: 23 (0 - 33)  SpO2: 94% (93% - 100%)    Mode: AC/ CMV (Assist Control/ Continuous Mandatory Ventilation), RR (machine): 18, TV (machine): 570, FiO2: 40, PEEP: 5, ITime: 1, MAP: 9, PIP: 18  I & Os for 24h ending 03-07 @ 07:00  =============================================  IN: 1468.4 ml / OUT: 4870 ml / NET: -3401.6 ml    I & Os for current day (as of 03-07 @ 08:19)  =============================================  IN: 250 ml / OUT: 300 ml / NET: -50 ml    CAPILLARY BLOOD GLUCOSE  123 (06 Mar 2017 06:00)      PHYSICAL EXAM:    General: NAD  HEENT: NC/AT; PERRL, clear conjunctiva  Neck: supple  Respiratory: CTA b/l  Cardiovascular: +S1/S2; RRR  Abdomen: soft, NT/ND; +BS x4  Extremities: WWP, 2+ peripheral pulses b/l; no LE edema  Skin: normal color and turgor; no rash  Neurological:     MEDICATIONS:  MEDICATIONS  (STANDING):  heparin  Injectable 5000Unit(s) SubCutaneous every 8 hours  chlorhexidine 0.12% Liquid 15milliLiter(s) Swish and Spit two times a day  vancomycin  IVPB 1500milliGRAM(s) IV Intermittent every 12 hours  insulin lispro (HumaLOG) corrective regimen sliding scale  SubCutaneous every 4 hours  amLODIPine   Tablet 10milliGRAM(s) Oral daily  levETIRAcetam  IVPB 500milliGRAM(s) IV Intermittent every 12 hours  cefTRIAXone   IVPB 2Gram(s) IV Intermittent every 12 hours  insulin glargine Injectable (LANTUS) 30Unit(s) SubCutaneous at bedtime  valsartan 320milliGRAM(s) Oral daily  hydrochlorothiazide   Tablet 25milliGRAM(s) Oral daily  levothyroxine 100MICROGram(s) Oral daily  metoprolol 25milliGRAM(s) Oral every 12 hours  potassium chloride    Tablet ER 40milliEquivalent(s) Oral every 4 hours    MEDICATIONS  (PRN):      ALLERGIES:  Allergies    No Known Allergies    Intolerances        LABS:                        12.3   8.7   )-----------( 196      ( 07 Mar 2017 06:03 )             36.0     07 Mar 2017 06:03    142    |  108    |  10     ----------------------------<  153    3.1     |  28     |  1.04     Ca    8.1        07 Mar 2017 06:03  Phos  3.2       07 Mar 2017 06:03  Mg     2.3       07 Mar 2017 06:03    TPro  5.7    /  Alb  2.3    /  TBili  0.7    /  DBili  x      /  AST  14     /  ALT  15     /  AlkPhos  30     07 Mar 2017 06:03          RADIOLOGY & ADDITIONAL TESTS: Reviewed.    A/P: 51M PMH HTN, HLD, hypothyroidism, and uncontrolled DM admitted to MICU w/ AMS intubated for airway protection. AMS resolved and extubated 3/6 PM. AAOx3 this AM w/ no neural deficits.    NEURO: extubated 3/6 afternoon  #AMS: resolved w/ no neural deficits on exam. unclear etiology. pt on admission in severe sepsis w/ fever and rigors on admission, elevated WBCs, and AMS. CSF high in glucose, elevated protein 74, and w/ little WBCs present; possible encephalitis etiology leading to seizure like activity vs hypertensive emergency w/ possible acute infarcts seen on MRI. CT head showing diminished attenuation in L woody, CTA head done w/o evidence of critical stenosis, thus low likelihood of stroke. DKA (now resolved) also possibly contributing to AMS, pt more alert this AM; UA and CXR w/o evidence of infection. MRI read w/ various acute infarcts concerning for septic vs thrombotic emboli, also showing mild diffuse ventriculomegaly; CSF Cx/BCx/UCx NGTD, TTE neg for vegetations. unlikely septic emboli given TTE findings and NGTD on BCx.  -pt intubated and sedated in ED for GCS score of 7 on arrival and for protection of airway - now extubated and mentating well  -pt in DKA on admission, now resolved  -currently on vanc/CTX, will continue pending CSF testing; to f/u w/ neuro and ID for need and duration of course  -general neuro consulted  -EEG f/u - no seizure activity to date, will continue to monitor as per neuro  -ordered for repeat MRI brain w/ and w/o contrast for further eval    PULM: initially obtunded and in need of intubation on admission  -extubated and mentating well. no respiratory problems    ENDO:  #DKA w/ positive hydroxybutyrate and high AG  -now on Lantus 30U qhs, to restart home humalog as needed    #Hypothyroid: levothyroxine PO    VASC:  #LLE ulceration w/ recent debridement and wound vac placement. lost to f/u  -vascular consulted - no intervention at this time  -wound consult/wound care    ID:  #AMS possibly 2/2 septic emboli (pt w/ recent staph infxn) vs encephalitis  -will continue antimicrobials as above  -f/u echo    #LLE ulceration w/ recent staph infxn w/ debridement and wound vac placement. salmon colored macular lesions now less erythematous likely erythema multiforme s/p staph infxn  -wound care consulted    FEN: DASH diet  PPX: Protonix 40mg IV qd, HSQ  Dispo: step down to F  Code: FULL CODE

## 2017-03-07 NOTE — PROGRESS NOTE ADULT - PROBLEM SELECTOR PLAN 4
LLE ulceration w/ recent staph infxn w/ debridement and wound vac placement. salmon colored macular lesions now less erythematous, no purulence, no warmth  - f/u wound care recs

## 2017-03-08 DIAGNOSIS — I16.1 HYPERTENSIVE EMERGENCY: ICD-10-CM

## 2017-03-08 DIAGNOSIS — I63.9 CEREBRAL INFARCTION, UNSPECIFIED: ICD-10-CM

## 2017-03-08 DIAGNOSIS — G92 TOXIC ENCEPHALOPATHY: ICD-10-CM

## 2017-03-08 DIAGNOSIS — J96.90 RESPIRATORY FAILURE, UNSPECIFIED, UNSPECIFIED WHETHER WITH HYPOXIA OR HYPERCAPNIA: ICD-10-CM

## 2017-03-08 LAB
AMPHET UR-MCNC: NEGATIVE — SIGNIFICANT CHANGE UP
ANION GAP SERPL CALC-SCNC: 8 MMOL/L — LOW (ref 9–16)
BARBITURATES, URINE.: NEGATIVE — SIGNIFICANT CHANGE UP
BENZODIAZ UR-MCNC: NEGATIVE — SIGNIFICANT CHANGE UP
BUN SERPL-MCNC: 14 MG/DL — SIGNIFICANT CHANGE UP (ref 7–23)
CALCIUM SERPL-MCNC: 8.8 MG/DL — SIGNIFICANT CHANGE UP (ref 8.5–10.5)
CHLORIDE SERPL-SCNC: 100 MMOL/L — SIGNIFICANT CHANGE UP (ref 96–108)
CO2 SERPL-SCNC: 33 MMOL/L — HIGH (ref 22–31)
COCAINE METAB.OTHER UR-MCNC: NEGATIVE — SIGNIFICANT CHANGE UP
CREAT SERPL-MCNC: 1.23 MG/DL — SIGNIFICANT CHANGE UP (ref 0.5–1.3)
CREATININE, URINE THERAPEUTIC: 37.7 MG/DL — SIGNIFICANT CHANGE UP
GLUCOSE SERPL-MCNC: 142 MG/DL — HIGH (ref 70–99)
HCT VFR BLD CALC: 38.7 % — LOW (ref 39–50)
HGB BLD-MCNC: 13.4 G/DL — SIGNIFICANT CHANGE UP (ref 13–17)
MAGNESIUM SERPL-MCNC: 2.3 MG/DL — SIGNIFICANT CHANGE UP (ref 1.6–2.4)
MCHC RBC-ENTMCNC: 28.6 PG — SIGNIFICANT CHANGE UP (ref 27–34)
MCHC RBC-ENTMCNC: 34.6 G/DL — SIGNIFICANT CHANGE UP (ref 32–36)
MCV RBC AUTO: 82.5 FL — SIGNIFICANT CHANGE UP (ref 80–100)
METHADONE UR-MCNC: NEGATIVE — SIGNIFICANT CHANGE UP
METHAQUALONE UR QL: NEGATIVE — SIGNIFICANT CHANGE UP
METHAQUALONE UR-MCNC: NEGATIVE — SIGNIFICANT CHANGE UP
OPIATES UR-MCNC: NEGATIVE — SIGNIFICANT CHANGE UP
PCP UR-MCNC: NEGATIVE — SIGNIFICANT CHANGE UP
PLATELET # BLD AUTO: 223 K/UL — SIGNIFICANT CHANGE UP (ref 150–400)
POTASSIUM SERPL-MCNC: 3.1 MMOL/L — LOW (ref 3.5–5.3)
POTASSIUM SERPL-SCNC: 3.1 MMOL/L — LOW (ref 3.5–5.3)
PROPOXYPH UR QL: NEGATIVE — SIGNIFICANT CHANGE UP
RBC # BLD: 4.69 M/UL — SIGNIFICANT CHANGE UP (ref 4.2–5.8)
RBC # FLD: 12.5 % — SIGNIFICANT CHANGE UP (ref 10.3–16.9)
SODIUM SERPL-SCNC: 141 MMOL/L — SIGNIFICANT CHANGE UP (ref 135–145)
THC UR QL: NEGATIVE — SIGNIFICANT CHANGE UP
WBC # BLD: 8.7 K/UL — SIGNIFICANT CHANGE UP (ref 3.8–10.5)
WBC # FLD AUTO: 8.7 K/UL — SIGNIFICANT CHANGE UP (ref 3.8–10.5)

## 2017-03-08 PROCEDURE — 99233 SBSQ HOSP IP/OBS HIGH 50: CPT | Mod: GC

## 2017-03-08 PROCEDURE — 96118: CPT

## 2017-03-08 PROCEDURE — 90791 PSYCH DIAGNOSTIC EVALUATION: CPT

## 2017-03-08 PROCEDURE — 93306 TTE W/DOPPLER COMPLETE: CPT | Mod: 26

## 2017-03-08 PROCEDURE — 70544 MR ANGIOGRAPHY HEAD W/O DYE: CPT | Mod: 26,59

## 2017-03-08 PROCEDURE — 70553 MRI BRAIN STEM W/O & W/DYE: CPT | Mod: 26

## 2017-03-08 PROCEDURE — 99232 SBSQ HOSP IP/OBS MODERATE 35: CPT

## 2017-03-08 RX ORDER — POTASSIUM CHLORIDE 20 MEQ
40 PACKET (EA) ORAL EVERY 4 HOURS
Qty: 0 | Refills: 0 | Status: DISCONTINUED | OUTPATIENT
Start: 2017-03-08 | End: 2017-03-08

## 2017-03-08 RX ORDER — POTASSIUM CHLORIDE 20 MEQ
10 PACKET (EA) ORAL
Qty: 0 | Refills: 0 | Status: COMPLETED | OUTPATIENT
Start: 2017-03-08 | End: 2017-03-08

## 2017-03-08 RX ORDER — LEVETIRACETAM 250 MG/1
500 TABLET, FILM COATED ORAL
Qty: 0 | Refills: 0 | Status: DISCONTINUED | OUTPATIENT
Start: 2017-03-08 | End: 2017-03-12

## 2017-03-08 RX ORDER — POTASSIUM CHLORIDE 20 MEQ
40 PACKET (EA) ORAL EVERY 4 HOURS
Qty: 0 | Refills: 0 | Status: COMPLETED | OUTPATIENT
Start: 2017-03-08 | End: 2017-03-08

## 2017-03-08 RX ADMIN — Medication 40 MILLIEQUIVALENT(S): at 23:06

## 2017-03-08 RX ADMIN — Medication 40 MILLIEQUIVALENT(S): at 18:10

## 2017-03-08 RX ADMIN — Medication 4: at 18:12

## 2017-03-08 RX ADMIN — HEPARIN SODIUM 5000 UNIT(S): 5000 INJECTION INTRAVENOUS; SUBCUTANEOUS at 23:06

## 2017-03-08 RX ADMIN — Medication 100 MILLIEQUIVALENT(S): at 12:57

## 2017-03-08 RX ADMIN — AMLODIPINE BESYLATE 10 MILLIGRAM(S): 2.5 TABLET ORAL at 06:43

## 2017-03-08 RX ADMIN — VALSARTAN 320 MILLIGRAM(S): 80 TABLET ORAL at 06:44

## 2017-03-08 RX ADMIN — Medication 81 MILLIGRAM(S): at 18:14

## 2017-03-08 RX ADMIN — Medication 100 MICROGRAM(S): at 06:43

## 2017-03-08 RX ADMIN — Medication 4: at 12:32

## 2017-03-08 RX ADMIN — LEVETIRACETAM 500 MILLIGRAM(S): 250 TABLET, FILM COATED ORAL at 18:14

## 2017-03-08 RX ADMIN — Medication 100 MILLIEQUIVALENT(S): at 16:51

## 2017-03-08 RX ADMIN — INSULIN GLARGINE 32 UNIT(S): 100 INJECTION, SOLUTION SUBCUTANEOUS at 23:07

## 2017-03-08 RX ADMIN — Medication 100 MILLIEQUIVALENT(S): at 12:00

## 2017-03-08 RX ADMIN — ATORVASTATIN CALCIUM 40 MILLIGRAM(S): 80 TABLET, FILM COATED ORAL at 23:07

## 2017-03-08 RX ADMIN — LEVETIRACETAM 420 MILLIGRAM(S): 250 TABLET, FILM COATED ORAL at 07:54

## 2017-03-08 RX ADMIN — CARVEDILOL PHOSPHATE 12.5 MILLIGRAM(S): 80 CAPSULE, EXTENDED RELEASE ORAL at 06:43

## 2017-03-08 RX ADMIN — HEPARIN SODIUM 5000 UNIT(S): 5000 INJECTION INTRAVENOUS; SUBCUTANEOUS at 06:43

## 2017-03-08 RX ADMIN — Medication 6: at 02:02

## 2017-03-08 RX ADMIN — CARVEDILOL PHOSPHATE 12.5 MILLIGRAM(S): 80 CAPSULE, EXTENDED RELEASE ORAL at 18:14

## 2017-03-08 RX ADMIN — Medication 6: at 23:05

## 2017-03-08 NOTE — PROGRESS NOTE ADULT - PROBLEM SELECTOR PLAN 3
HbA1C 11% on admission, with DKA now resolved. Pt required 10 units of coverage, -150 in the AM  - will redose Lantus to 32 units at bedtime, will start premeal depending on coverage HbA1C 11% on admission, with DKA now resolved. Pt required 10 units of coverage, -150 in the AM  - Lantus 32 units at bedtime. Not requiring premeal at this time.   - MISS. MRI read w/ various acute infarcts concerning for septic vs thrombotic emboli. Specifically, punctate areas of acute infarction within the right occipital cortex, left thalamus medially, and just superior to the right splenium within   the periventricular white matter along the medial edge of the posterior body of the right lateral ventricle. TTE neg for vegetations, AMARI done today negative for acute vegetations. Now off of antibiotics.   - Aspirin started.  - Neuro following.

## 2017-03-08 NOTE — PROGRESS NOTE ADULT - PROBLEM SELECTOR PLAN 4
LLE ulceration w/ recent staph infxn w/ debridement and wound vac placement. salmon colored macular lesions now less erythematous, no purulence, no warmth  - f/u wound care recs Resolved. Present on admission secondary to altered mental status, now improved since mental status improving.

## 2017-03-08 NOTE — PROGRESS NOTE ADULT - PROBLEM SELECTOR PLAN 6
DASH/TLC diet  Replete lytes as needed (K~4, Mg~2) NPO for AMARI but will restart DASH/TLC diet after.   Replete lytes as needed (K~4, Mg~2) HbA1C 11% on admission, with DKA now resolved. Pt required 10 units of coverage, -150 in the AM  - Lantus 32 units at bedtime. Not requiring premeal at this time.   - MISS.

## 2017-03-08 NOTE — PROGRESS NOTE ADULT - PROBLEM SELECTOR PLAN 1
resolved w/ no focal deficits on exam, still unclear etiology at this point. Pt on admission in severe sepsis w/ fever and rigors on admission, elevated WBCs, and AMS. CSF high in glucose, elevated protein 74, and w/ little WBCs present; possible encephalitis etiology leading to seizure-like activity vs hypertensive emergency w/ possible acute infarcts seen on MRI. CT head showing diminished attenuation in L woody, CTA head done w/o evidence of critical stenosis, thus low likelihood of stroke. DKA (now resolved) also possibly contributing to AMS, pt more alert this AM; UA and CXR w/o evidence of infection. MRI read w/ various acute infarcts concerning for septic vs thrombotic emboli, also showing mild diffuse ventriculomegaly; CSF Cx/BCx/UCx NGTD, TTE neg for vegetations.  Pt now extubated and mentating well  - Discontinue Vanc/ Ceftriaxone per ID  - BP control, current regimen: HCTZ 50mg daily, valsartan 320mg daily, amlodipine 10mg daily, metoprolol to be switched to Coreg 6.25mg BID  - Repeat MRI brain w/ and w/o contrast for further eval  - f/u Neuro recs resolved w/ no focal deficits on exam, still unclear etiology at this point. Pt on admission in severe sepsis w/ fever and rigors on admission, elevated WBCs, and AMS. CSF high in glucose, elevated protein 74, and w/ little WBCs present; possible encephalitis etiology leading to seizure-like activity vs hypertensive emergency w/ possible acute infarcts seen on MRI. CT head showing diminished attenuation in L woody, CTA head done w/o evidence of critical stenosis, thus low likelihood of stroke. DKA (now resolved) also possibly contributing to AMS, pt more alert this AM; UA and CXR w/o evidence of infection. MRI read w/ various acute infarcts concerning for septic vs thrombotic emboli, also showing mild diffuse ventriculomegaly; CSF Cx/BCx/UCx NGTD, TTE neg for vegetations.  Pt now extubated and mentating well  - Discontinue Vanc/ Ceftriaxone per ID  - BP control, current regimen: HCTZ 50mg daily, valsartan 320mg daily, amlodipine 10mg daily, metoprolol to be switched to Coreg 6.25mg BID  - Repeat MRI brain w/ and w/o contrast for further eval  - Continue keppra, will switch to PO.   - f/u Neuro recs Now resolved w/ no focal deficits on exam, still unclear etiology at this point. Pt on admission in severe sepsis w/ fever and rigors on admission, elevated WBCs, and AMS, concern for meningitis but ruled out after LP.  Still with possible encephalitis leading to seizure-like activity vs hypertensive emergency w/ possible acute infarcts seen on MRI. CT head showing diminished attenuation in L woody, CTA head done w/o evidence of critical stenosis, thus low likelihood of stroke. DKA (now resolved) also possibly contributing to AMS. MRI read w/ various acute infarcts concerning for septic vs thrombotic emboli. TTE neg for vegetations. Now off of antibiotics.   - Will obtain AMARI to further evaluate for septic emboli.   - Repeat MRI brain w/ and w/o contrast for further eval  - Continue keppra 500mg BID, will switch to PO.   - F/u neuro recs Improving w/ no focal deficits on exam but still with frontal lobe dysfunction on neuro testing, still unclear etiology at this point. Pt on admission in severe sepsis w/ fever and rigors on admission, elevated WBCs, and AMS, concern for meningitis but ruled out after LP. Still with possible encephalitis leading to seizure-like activity vs hypertensive emergency w/ possible acute infarcts seen on MRI. CT head showing diminished attenuation in L woody, CTA head done w/o evidence of critical stenosis, thus low likelihood of stroke. DKA (now resolved) also possibly contributing to AMS. MRI read w/ various acute infarcts concerning for septic vs thrombotic emboli. TTE neg for vegetations. Now off of antibiotics.   - Will obtain AMARI to further evaluate for septic emboli.   - Repeat MRI brain w/ and w/o contrast for further eval  - Continue keppra 500mg BID, will switch to PO.   - F/u neuro recs

## 2017-03-08 NOTE — PROGRESS NOTE ADULT - PROBLEM SELECTOR PLAN 2
Pt with persistent elevated BP (-180)  - HCTZ 50mg daily, valsartan 320mg daily, amlodipine 10mg daily, metoprolol to be switched to Coreg 6.25mg BID Pt with persistent elevated BP (-180). Unclear why patient requiring multiple antihypertensives, can workup as outpatient.   - Now on HCTZ 50mg daily, valsartan 320mg daily, amlodipine 10mg daily, Coreg 6.25mg BID. Most of which are home medications. Pt with persistent elevated BP (-180). Unclear why patient requiring multiple antihypertensives, can workup as outpatient.   - Now on HCTZ 50mg daily, valsartan 320mg daily, amlodipine 10mg daily, Coreg 12.5mg BID. Most of which are home medications.

## 2017-03-08 NOTE — PROGRESS NOTE ADULT - PROBLEM SELECTOR PLAN 1
- Pending repeat MRI brain (with and without contrast)  - AMARI to r/o thrombus or valvular vegetation   - C/w Keppra 500mg PO bid - Pending repeat MRI brain (with and without contrast). Would add MRA as well to investigate for vasculitis.   - Check ESR, MERCEDEZ, C3, C4, hemolytic complement, dsDNA, ANCA, RF, serum protein electrophoresis, anti-SSA, anti-SSB, TSH, thyroperoxidase Ab, thyroglobulin.  - C/w Keppra 500mg PO bid  - To be evaluated by neuropsychologist today - Pending repeat MRI brain (with and without contrast). Would add MRA head without contrast as well to investigate for vasculitis.   - Check ESR, MERCEDEZ, C3, C4, hemolytic complement, dsDNA, ANCA, RF, serum protein electrophoresis, anti-SSA, anti-SSB, TSH, thyroperoxidase Ab, thyroglobulin.  - C/w Keppra 500mg PO bid  - To be evaluated by neuropsychologist today

## 2017-03-08 NOTE — PROGRESS NOTE ADULT - SUBJECTIVE AND OBJECTIVE BOX
INTERVAL HPI/OVERNIGHT EVENTS:  Patient seen and examined. No acute events overnight. Patient states that he continues to feel improved and that the mental "fogginess" is dissipating.   Since yesterday, antibiotics were discontinued as per infectious disease service recommendation. He has been started on [id number] for secondary stroke prevention.   Still with mild dry cough that has been present post-extubation.     VITAL SIGNS:  T(F): 98.1  HR: 74  BP: 170/93  RR: 17  SpO2: 95%  Wt(kg): --    PHYSICAL EXAM:    General: Well-developed, well nourished, in no acute distress.  Eyes: No scleral icterus or injection  Cardiovascular: RRR, S1/S2, no murmur.   Neurologic:  - Mental Status:  Alert, awake, oriented to person, place, and time; Intact fluency, naming, repetition, and comprehension; Immediate recall is 3/3 words and delayed recall is also 3/3 words at 5 minutes; able to spell WORLD backwards; Able to perform serial 7 subtraction without difficulty; Good overall fund of knowledge.  - Cranial Nerves II-XII:    II:  Visual fields are full to confrontation; Pupils are equal, round, and reactive to light.  III, IV, VI:  Extraocular movements are intact without nystagmus.  V:  Facial sensation is intact in the V1-V3 distribution bilaterally.  VII:  Face is symmetric with normal eye closure and smile  VIII:  Hearing is intact to finger rub.  IX, X:  Uvula is midline and soft palate rises symmetrically  XI:  Head turning and shoulder shrug are intact.  XII:  Tongue protrudes in the midline.  - Motor:  Strength is 5/5 throughout.  There is no pronator drift.  Normal muscle bulk and tone throughout.  - Reflexes:  Prior left patellar hyperreflexia and left extensor plantar reflex has resolved. Reflexes 2+ and symmetric at the biceps, triceps, brachioradialis, knees, and ankles.  Plantar responses flexor.  - Sensory:  Intact to light touch, and joint-position sense throughout.  - Coordination:  Finger-nose-finger and heel-knee-shin intact without dysmetria.  Rapid alternating hand movements intact.  - Gait:   Normal steps, base, arm swing, and turning.  Romberg testing is negative.      MEDICATIONS  (STANDING):  heparin  Injectable 5000Unit(s) SubCutaneous every 8 hours  insulin lispro (HumaLOG) corrective regimen sliding scale  SubCutaneous every 4 hours  amLODIPine   Tablet 10milliGRAM(s) Oral daily  levETIRAcetam  IVPB 500milliGRAM(s) IV Intermittent every 12 hours  valsartan 320milliGRAM(s) Oral daily  levothyroxine 100MICROGram(s) Oral daily  hydrochlorothiazide   Tablet 50milliGRAM(s) Oral daily  insulin glargine Injectable (LANTUS) 32Unit(s) SubCutaneous at bedtime  atorvastatin 40milliGRAM(s) Oral at bedtime  aspirin  chewable 81milliGRAM(s) Oral daily  carvedilol 12.5milliGRAM(s) Oral every 12 hours    MEDICATIONS  (PRN):      Allergies    No Known Allergies    Intolerances        LABS:                        13.4   8.7   )-----------( 223      ( 08 Mar 2017 06:11 )             38.7     08 Mar 2017 06:13    141    |  100    |  14     ----------------------------<  142    3.1     |  33     |  1.23     Ca    8.8        08 Mar 2017 06:13  Phos  3.2       07 Mar 2017 06:03  Mg     2.3       08 Mar 2017 06:13    TPro  5.7    /  Alb  2.3    /  TBili  0.7    /  DBili  x      /  AST  14     /  ALT  15     /  AlkPhos  30     07 Mar 2017 06:03 INTERVAL HPI/OVERNIGHT EVENTS:  Patient seen and examined. No acute events overnight. Patient states that he continues to feel improved and that the mental "fogginess" is dissipating.   Since yesterday, antibiotics were discontinued as per infectious disease service recommendation. He has been started on [id number] for secondary stroke prevention.   Still with mild dry cough that has been present post-extubation.   Was seen by kunal trying to hold the sharp end of a knife in his hand to cut bread, and was also seen trying to open the tab of a can of soda in the wrong direction.     VITAL SIGNS:  T(F): 98.1  HR: 74  BP: 170/93  RR: 17  SpO2: 95%  Wt(kg): --    PHYSICAL EXAM:    General: Well-developed, well nourished, in no acute distress.  Eyes: No scleral icterus or injection  Cardiovascular: RRR, S1/S2, no murmur.   Neurologic:  - Mental Status:  Alert, awake, oriented to person, place, and time; Intact fluency, naming, repetition, and comprehension; Immediate recall is 3/3 words and delayed recall is also 3/3 words at 5 minutes; able to spell WORLD backwards; Able to perform serial 7 subtraction without difficulty; Good overall fund of knowledge.  - Cranial Nerves II-XII:    II:  Visual fields are full to confrontation; Pupils are equal, round, and reactive to light.  III, IV, VI:  Extraocular movements are intact without nystagmus.  V:  Facial sensation is intact in the V1-V3 distribution bilaterally.  VII:  Face is symmetric with normal eye closure and smile  VIII:  Hearing is intact to finger rub.  IX, X:  Uvula is midline and soft palate rises symmetrically  XI:  Head turning and shoulder shrug are intact.  XII:  Tongue protrudes in the midline.  - Motor:  Strength is 5/5 throughout.  There is no pronator drift.  Normal muscle bulk and tone throughout.  - Reflexes:  Prior left patellar hyperreflexia has resolved. Reflexes 2+ and symmetric at the biceps, triceps, brachioradialis, knees, and ankles.  Plantar responses flexor on right and extensor on left.  - Sensory:  Intact to light touch, and joint-position sense throughout.  - Coordination:  Finger-nose-finger and heel-knee-shin intact without dysmetria.  Rapid alternating hand movements intact.  - Gait:   Normal steps, base, arm swing, and turning.  Romberg testing is negative.      MEDICATIONS  (STANDING):  heparin  Injectable 5000Unit(s) SubCutaneous every 8 hours  insulin lispro (HumaLOG) corrective regimen sliding scale  SubCutaneous every 4 hours  amLODIPine   Tablet 10milliGRAM(s) Oral daily  levETIRAcetam  IVPB 500milliGRAM(s) IV Intermittent every 12 hours  valsartan 320milliGRAM(s) Oral daily  levothyroxine 100MICROGram(s) Oral daily  hydrochlorothiazide   Tablet 50milliGRAM(s) Oral daily  insulin glargine Injectable (LANTUS) 32Unit(s) SubCutaneous at bedtime  atorvastatin 40milliGRAM(s) Oral at bedtime  aspirin  chewable 81milliGRAM(s) Oral daily  carvedilol 12.5milliGRAM(s) Oral every 12 hours    MEDICATIONS  (PRN):      Allergies    No Known Allergies    Intolerances        LABS:                        13.4   8.7   )-----------( 223      ( 08 Mar 2017 06:11 )             38.7     08 Mar 2017 06:13    141    |  100    |  14     ----------------------------<  142    3.1     |  33     |  1.23     Ca    8.8        08 Mar 2017 06:13  Phos  3.2       07 Mar 2017 06:03  Mg     2.3       08 Mar 2017 06:13    TPro  5.7    /  Alb  2.3    /  TBili  0.7    /  DBili  x      /  AST  14     /  ALT  15     /  AlkPhos  30     07 Mar 2017 06:03 INTERVAL HPI/OVERNIGHT EVENTS:  Patient seen and examined. No acute events overnight. Patient states that he continues to feel improved and that the mental "fogginess" is dissipating.   Since yesterday, antibiotics were discontinued as per infectious disease service recommendation. He has been started on [id number] for secondary stroke prevention.   Still with mild dry cough that has been present post-extubation.   Was seen by kunal trying to hold the sharp end of a knife in his hand to cut bread, and was also seen trying to open the tab of a can of soda in the wrong direction.     VITAL SIGNS:  T(F): 98.1  HR: 74  BP: 170/93  RR: 17  SpO2: 95%  Wt(kg): --    PHYSICAL EXAM:    General: Well-developed, well nourished, in no acute distress.  Eyes: No scleral icterus or injection  Cardiovascular: RRR, S1/S2, no murmur.   Neurologic:  - Mental Status:  Alert, awake, oriented to person, place, and time; Intact fluency, naming, repetition, and comprehension; Immediate recall is 5/5 words and delayed recall is also 0/5 words at 5 minutes; able to spell WORLD backwards; Able to perform serial 7 subtraction without difficulty.  MOCA score 23/30.  - Cranial Nerves II-XII:    II:  Visual fields are full to confrontation; Pupils are equal, round, and reactive to light.  III, IV, VI:  Extraocular movements are intact without nystagmus.  V:  Facial sensation is intact in the V1-V3 distribution bilaterally.  VII:  Face is symmetric with normal eye closure and smile  VIII:  Hearing is intact to finger rub.  IX, X:  Uvula is midline and soft palate rises symmetrically  XI:  Head turning and shoulder shrug are intact.  XII:  Tongue protrudes in the midline.  - Motor:  Strength is 5/5 throughout.  There is no pronator drift.  Normal muscle bulk and tone throughout.  - Reflexes:  Prior left patellar hyperreflexia has resolved. Reflexes 2+ and symmetric at the biceps, triceps, brachioradialis, knees, and ankles.  Plantar responses flexor on right and extensor on left.  - Sensory:  Intact to light touch, and joint-position sense throughout.  - Coordination:  Finger-nose-finger and heel-knee-shin intact without dysmetria.  Rapid alternating hand movements intact.  - Gait:   Normal steps, base, arm swing, and turning.  Romberg testing is negative.    MEDICATIONS  (STANDING):  heparin  Injectable 5000Unit(s) SubCutaneous every 8 hours  insulin lispro (HumaLOG) corrective regimen sliding scale  SubCutaneous every 4 hours  amLODIPine   Tablet 10milliGRAM(s) Oral daily  levETIRAcetam  IVPB 500milliGRAM(s) IV Intermittent every 12 hours  valsartan 320milliGRAM(s) Oral daily  levothyroxine 100MICROGram(s) Oral daily  hydrochlorothiazide   Tablet 50milliGRAM(s) Oral daily  insulin glargine Injectable (LANTUS) 32Unit(s) SubCutaneous at bedtime  atorvastatin 40milliGRAM(s) Oral at bedtime  aspirin  chewable 81milliGRAM(s) Oral daily  carvedilol 12.5milliGRAM(s) Oral every 12 hours    ALLERGIES:  No Known Allergies    LABS:                     13.4   8.7   )-----------( 223      ( 08 Mar 2017 06:11 )             38.7     141    |  100    |  14     ----------------------------<  142    ( 08 Mar 2017 06:11 )  3.1     |  33     |  1.23     Ca    8.8       ( 08 Mar 2017 06:13 )  Phos  3.2      ( 07 Mar 2017 06:03 )  Mg     2.3      ( 08 Mar 2017 06:13 )    TPro  5.7    /  Alb  2.3    /  TBili  0.7    /  DBili  x      /  AST  14     /  ALT  15     /  AlkPhos  30    ( 07 Mar 2017 06:03 )

## 2017-03-08 NOTE — PROGRESS NOTE ADULT - PROBLEM SELECTOR PLAN 5
TSH wnl, continue Synthroid 100mcg daily LLE ulceration w/ recent staph infxn w/ debridement and wound vac placement. salmon colored macular lesions now less erythematous, no purulence, no warmth  - f/u wound care recs

## 2017-03-08 NOTE — ADVANCED PRACTICE NURSE CONSULT - ASSESSMENT
52 yo M w/ PMH HTN, HLD, hypothyroidism, and uncontrolled DM admitted to MICU w/ AMS in setting of slurred speech, left sided weakness and fever, intubated for airway protection. Initially worked up for meningitis, now r/o. MRI brain revealing multiple punctate acute infarcts concerning for embolic shower however TTE negative. Pt with previous wound vac to left shin wound. Site measures 5.5x5 cm with violaceous periwound and red wound base, min-mod amount of serosang drainage, good pedal pulse. Pt hasn't followed up with Dr. Wheat since wound vac dc'd.

## 2017-03-08 NOTE — PROGRESS NOTE ADULT - SUBJECTIVE AND OBJECTIVE BOX
OVERNIGHT EVENTS/SUBJECTIVE:    Vital Signs Last 12 Hrs  T(F): 97.6, Max: 97.6 (03-07 @ 20:55)  HR: 71 (71 - 78)  BP: 163/89 (162/94 - 163/89)  RR: 18 (16 - 18)  SpO2: 96% (95% - 96%)    I & Os for 24h ending 07 Mar 2017 07:00  =============================================  IN: 1468.4 ml / OUT: 4870 ml / NET: -3401.6 ml    I & Os for current day (as of 08 Mar 2017 06:11)  =============================================  IN: 250 ml / OUT: 2740 ml / NET: -2490 ml    PHYSICAL EXAM:  Constitutional: comfortable, NAD  HEENT: PERRLA, MMM, no oral lesions  Respiratory: CTA b/l, no wheezes, rhonchi, or rales  Cardiovascular: RRR, normal S1,S2, no R/M/G  Gastrointestinal: non-distended, nontender, normoactive BS, no hepatosplenomegaly  Neurological: AAO x 3, follows all commands  Vascular: peripheral pulses 2+ b/l   Extremities: WWP, RLE with gauze from previous cellulitis, no warmth or tenderness, no purulence, mild erythema. LLE with mild erythematous scabbed wound which pt's wife reports resulted from bumping in to the side of the bed, no purulence or warmth.    LABS:                        12.3   8.7   )-----------( 196      ( 07 Mar 2017 06:03 )             36.0     07 Mar 2017 06:03    142    |  108    |  10     ----------------------------<  153    3.1     |  28     |  1.04     Ca    8.1        07 Mar 2017 06:03  Phos  3.2       07 Mar 2017 06:03  Mg     2.3       07 Mar 2017 06:03    TPro  5.7    /  Alb  2.3    /  TBili  0.7    /  DBili  x      /  AST  14     /  ALT  15     /  AlkPhos  30     07 Mar 2017 06:03    RADIOLOGY & ADDITIONAL TESTS:    MEDICATIONS  (STANDING):  heparin  Injectable 5000Unit(s) SubCutaneous every 8 hours  insulin lispro (HumaLOG) corrective regimen sliding scale  SubCutaneous every 4 hours  amLODIPine   Tablet 10milliGRAM(s) Oral daily  levETIRAcetam  IVPB 500milliGRAM(s) IV Intermittent every 12 hours  valsartan 320milliGRAM(s) Oral daily  levothyroxine 100MICROGram(s) Oral daily  hydrochlorothiazide   Tablet 50milliGRAM(s) Oral daily  insulin glargine Injectable (LANTUS) 32Unit(s) SubCutaneous at bedtime  atorvastatin 40milliGRAM(s) Oral at bedtime  aspirin  chewable 81milliGRAM(s) Oral daily  carvedilol 12.5milliGRAM(s) Oral every 12 hours    MEDICATIONS  (PRN): Hospital Course: 51M PMH HTN, HLD, hypothyroidism, and uncontrolled DM started to have stroke like sx followed by AMS on 3/3 PM. Fiance noted at dinner L facial droop w/ slurred speech, L sided weakness, and unsteady gait followed by AMS then intractable vomiting and urinary/fecal incontinence. Recent hx of LLE wound staph infxn previously debrided w/ wound vac placement, lost to f/u. BIBEMS on 3/4 admitted to MICU w/ initial GCS of 7 and obtunded; pt w/ rigors, temp 102F and /180. Pt intubated for airway protection. Stroke code called CT head showing diffuse attenuation of L woody area but CTA head/neck neg for critical stenosis. Pt also found to be in DKA and started on insulin gtt, was able to be bridged to Lantus few hours after gtt initiation. Initiated empiric meningitis tx w/ cefepime/vanc/ampicillin/acyclovir. LP done w/ high opening pressure of 37, little to know WBCs, CSF glucose 230, CSF protein elevated at 74. Negative for bacterial meningitis, neg HSV. CSF Cx/UCx/BCx neg. RVP neg. HIV and HSV neg. Unable to perform Acid Fast on CSF. Antimicrobials de-escalated given findings but kept on CTX/vanc as per Neuro. MRI brain w/o contrast done as pt initially w/ [facility] (pre-renal, now resolved s/p IVF) found to have various acute infarcts concerning for septic vs thrombotic emboli, also showing mild diffuse ventriculomegaly. No vegetations noted on TTE. Neurology consulted, placed on EEG w/ no seizure activity to date. Plan for continued EEG monitoring and repeat MRI brain. Possible etiology includes encephalitis leading to seizure activity vs hypertensive emergency leading to acute infarcts seen on MRI. Awaiting repeat MRI for further evaluation. Stable for stepdown to [facility].    OVERNIGHT EVENTS/SUBJECTIVE:    Vital Signs Last 12 Hrs  T(F): 97.6, Max: 97.6 (03-07 @ 20:55)  HR: 71 (71 - 78)  BP: 163/89 (162/94 - 163/89)  RR: 18 (16 - 18)  SpO2: 96% (95% - 96%)    I & Os for 24h ending 07 Mar 2017 07:00  =============================================  IN: 1468.4 ml / OUT: 4870 ml / NET: -3401.6 ml    I & Os for current day (as of 08 Mar 2017 06:11)  =============================================  IN: 250 ml / OUT: 2740 ml / NET: -2490 ml    PHYSICAL EXAM:  Constitutional: comfortable, NAD  HEENT: PERRLA, MMM, no oral lesions  Respiratory: CTA b/l, no wheezes, rhonchi, or rales  Cardiovascular: RRR, normal S1,S2, no R/M/G  Gastrointestinal: non-distended, nontender, normoactive BS, no hepatosplenomegaly  Neurological: AAO x 3, follows all commands  Vascular: peripheral pulses 2+ b/l   Extremities: WWP, RLE with gauze from previous cellulitis, no warmth or tenderness, no purulence, mild erythema. LLE with mild erythematous scabbed wound which pt's wife reports resulted from bumping in to the side of the bed, no purulence or warmth.    LABS:                        12.3   8.7   )-----------( 196      ( 07 Mar 2017 06:03 )             36.0     07 Mar 2017 06:03    142    |  108    |  10     ----------------------------<  153    3.1     |  28     |  1.04     Ca    8.1        07 Mar 2017 06:03  Phos  3.2       07 Mar 2017 06:03  Mg     2.3       07 Mar 2017 06:03    TPro  5.7    /  Alb  2.3    /  TBili  0.7    /  DBili  x      /  AST  14     /  ALT  15     /  AlkPhos  30     07 Mar 2017 06:03    RADIOLOGY & ADDITIONAL TESTS:    MEDICATIONS  (STANDING):  heparin  Injectable 5000Unit(s) SubCutaneous every 8 hours  insulin lispro (HumaLOG) corrective regimen sliding scale  SubCutaneous every 4 hours  amLODIPine   Tablet 10milliGRAM(s) Oral daily  levETIRAcetam  IVPB 500milliGRAM(s) IV Intermittent every 12 hours  valsartan 320milliGRAM(s) Oral daily  levothyroxine 100MICROGram(s) Oral daily  hydrochlorothiazide   Tablet 50milliGRAM(s) Oral daily  insulin glargine Injectable (LANTUS) 32Unit(s) SubCutaneous at bedtime  atorvastatin 40milliGRAM(s) Oral at bedtime  aspirin  chewable 81milliGRAM(s) Oral daily  carvedilol 12.5milliGRAM(s) Oral every 12 hours    MEDICATIONS  (PRN): Hospital Course: 51M PMH HTN, HLD, hypothyroidism, and uncontrolled DM started to have stroke like sx followed by AMS on 3/3 PM. Fiance noted at dinner L facial droop w/ slurred speech, L sided weakness, and unsteady gait followed by AMS then intractable vomiting and urinary/fecal incontinence. Recent hx of LLE wound staph infxn previously debrided w/ wound vac placement, lost to f/u. BIBEMS on 3/4 admitted to MICU w/ initial GCS of 7 and obtunded; pt w/ rigors, temp 102F and /180. Pt intubated for airway protection. Stroke code called CT head showing diffuse attenuation of L woody area but CTA head/neck neg for critical stenosis. Pt also found to be in DKA and started on insulin gtt, was able to be bridged to Lantus few hours after gtt initiation. Initiated empiric meningitis tx w/ cefepime/vanc/ampicillin/acyclovir. LP done w/ high opening pressure of 37, little to know WBCs, CSF glucose 230, CSF protein elevated at 74. Negative for bacterial meningitis, neg HSV. CSF Cx/UCx/BCx neg. RVP neg. HIV and HSV neg. Unable to perform Acid Fast on CSF. Antimicrobials de-escalated given findings but kept on CTX/vanc as per Neuro. MRI brain w/o contrast done as pt initially w/ [facility] (pre-renal, now resolved s/p IVF) found to have various acute infarcts concerning for septic vs thrombotic emboli, also showing mild diffuse ventriculomegaly. No vegetations noted on TTE. Neurology consulted, placed on EEG w/ no seizure activity to date. Plan for continued EEG monitoring and repeat MRI brain. Possible etiology includes encephalitis leading to seizure activity vs hypertensive emergency leading to acute infarcts seen on MRI. Awaiting repeat MRI for further evaluation. Stable for stepdown to [facility].    OVERNIGHT EVENTS/SUBJECTIVE: NATALI overnight. Patient slept well and feeling well this morning without any complaints. Specifically no f/c/cp/sob. Eating well without nausea or change in bowel habits. No confusion or new weakness/numbness.     Vital Signs Last 12 Hrs  T(F): 97.6, Max: 97.6 (03-07 @ 20:55)  HR: 71 (71 - 78)  BP: 163/89 (162/94 - 163/89)  RR: 18 (16 - 18)  SpO2: 96% (95% - 96%)    I & Os for 24h ending 07 Mar 2017 07:00  =============================================  IN: 1468.4 ml / OUT: 4870 ml / NET: -3401.6 ml    I & Os for current day (as of 08 Mar 2017 06:11)  =============================================  IN: 250 ml / OUT: 2740 ml / NET: -2490 ml    PHYSICAL EXAM:  Constitutional: NAD, AAOx3, comfortable in bed.   Respiratory: In no respiratory distress, CTAB, no w/r/r.   Cardiovascular: RRR, normal S1,S2, no m/r/g  Gastrointestinal: +BS, NTND.   Neurological: AAOx3, CN intact. Strength and sensation intact throughout.   Vascular: peripheral pulses 2+ b/l   Extremities: WWP, RLE with gauze from previous cellulitis, no warmth or tenderness, no purulence, mild erythema. LLE with mild erythematous scabbed wound which pt's wife reports resulted from bumping in to the side of the bed, no purulence or warmth.    LABS:                        12.3   8.7   )-----------( 196      ( 07 Mar 2017 06:03 )             36.0     07 Mar 2017 06:03    142    |  108    |  10     ----------------------------<  153    3.1     |  28     |  1.04     Ca    8.1        07 Mar 2017 06:03  Phos  3.2       07 Mar 2017 06:03  Mg     2.3       07 Mar 2017 06:03    TPro  5.7    /  Alb  2.3    /  TBili  0.7    /  DBili  x      /  AST  14     /  ALT  15     /  AlkPhos  30     07 Mar 2017 06:03    RADIOLOGY & ADDITIONAL TESTS:    MEDICATIONS  (STANDING):  heparin  Injectable 5000Unit(s) SubCutaneous every 8 hours  insulin lispro (HumaLOG) corrective regimen sliding scale  SubCutaneous every 4 hours  amLODIPine   Tablet 10milliGRAM(s) Oral daily  levETIRAcetam  IVPB 500milliGRAM(s) IV Intermittent every 12 hours  valsartan 320milliGRAM(s) Oral daily  levothyroxine 100MICROGram(s) Oral daily  hydrochlorothiazide   Tablet 50milliGRAM(s) Oral daily  insulin glargine Injectable (LANTUS) 32Unit(s) SubCutaneous at bedtime  atorvastatin 40milliGRAM(s) Oral at bedtime  aspirin  chewable 81milliGRAM(s) Oral daily  carvedilol 12.5milliGRAM(s) Oral every 12 hours    MEDICATIONS  (PRN): OVERNIGHT EVENTS/SUBJECTIVE: NATALI overnight. Patient slept well and feeling well this morning without any complaints. Specifically no f/c/cp/sob. Eating well without nausea or change in bowel habits. No confusion or new weakness/numbness.     Vital Signs Last 12 Hrs  T(F): 97.6, Max: 97.6 (03-07 @ 20:55)  HR: 71 (71 - 78)  BP: 163/89 (162/94 - 163/89)  RR: 18 (16 - 18)  SpO2: 96% (95% - 96%)    I & Os for 24h ending 07 Mar 2017 07:00  =============================================  IN: 1468.4 ml / OUT: 4870 ml / NET: -3401.6 ml    I & Os for current day (as of 08 Mar 2017 06:11)  =============================================  IN: 250 ml / OUT: 2740 ml / NET: -2490 ml    PHYSICAL EXAM:  Constitutional: NAD, AAOx3, comfortable in bed.   Respiratory: In no respiratory distress, CTAB, no w/r/r.   Cardiovascular: RRR, normal S1,S2, no m/r/g  Gastrointestinal: +BS, NTND.   Neurological: AAOx3, CN intact. Strength and sensation intact throughout.   Vascular: peripheral pulses 2+ b/l   Extremities: WWP, RLE with gauze from previous cellulitis, no warmth or tenderness, no purulence, mild erythema. LLE with mild erythematous scabbed wound which pt's wife reports resulted from bumping in to the side of the bed, no purulence or warmth.    LABS:                        12.3   8.7   )-----------( 196      ( 07 Mar 2017 06:03 )             36.0     07 Mar 2017 06:03    142    |  108    |  10     ----------------------------<  153    3.1     |  28     |  1.04     Ca    8.1        07 Mar 2017 06:03  Phos  3.2       07 Mar 2017 06:03  Mg     2.3       07 Mar 2017 06:03    TPro  5.7    /  Alb  2.3    /  TBili  0.7    /  DBili  x      /  AST  14     /  ALT  15     /  AlkPhos  30     07 Mar 2017 06:03    RADIOLOGY & ADDITIONAL TESTS:    MEDICATIONS  (STANDING):  heparin  Injectable 5000Unit(s) SubCutaneous every 8 hours  insulin lispro (HumaLOG) corrective regimen sliding scale  SubCutaneous every 4 hours  amLODIPine   Tablet 10milliGRAM(s) Oral daily  levETIRAcetam  IVPB 500milliGRAM(s) IV Intermittent every 12 hours  valsartan 320milliGRAM(s) Oral daily  levothyroxine 100MICROGram(s) Oral daily  hydrochlorothiazide   Tablet 50milliGRAM(s) Oral daily  insulin glargine Injectable (LANTUS) 32Unit(s) SubCutaneous at bedtime  atorvastatin 40milliGRAM(s) Oral at bedtime  aspirin  chewable 81milliGRAM(s) Oral daily  carvedilol 12.5milliGRAM(s) Oral every 12 hours    MEDICATIONS  (PRN):

## 2017-03-08 NOTE — PROGRESS NOTE ADULT - ASSESSMENT
50 y/o M with PMHx uncontrolled DM, HTN, hypothyroidism who presented on 03/04 with slurred speech, left-sided weakness, and fever. MRI brain shows multiple punctate acute infarcts concerning for embolic shower. This suggests the possibility that he may have been bacteremic, with septic emboli to brain and encephalopathy causing seizures. CSF studies unrevealing. However, elevated CSF protein can be seen with viral or immune-mediated encephalitis. EEG showing moderate diffuse cerebral dysfunction, worse over the left hemisphere. Neurological exam today much improved, now able to easily spell WORLD backwards and perform serial 7s. However still with psychomotor slowing and blunted affect, impaired executive function and visuospatial skills. 52 y/o M with PMHx uncontrolled DM, HTN, hypothyroidism who presented on 03/04 with slurred speech, left-sided weakness, and fever. MRI brain shows multiple punctate acute infarcts concerning for embolic shower. This suggests the possibility that he may have been bacteremic, with septic emboli to brain and encephalopathy causing seizures. CSF studies unrevealing. However, elevated CSF protein can be seen with viral or immune-mediated encephalitis. EEG showing moderate diffuse cerebral dysfunction, worse over the left hemisphere. AMARI without any evidence of thrombi or vegetations. Neurological exam today somewhat improved, now able to easily spell WORLD backwards and perform serial 7s. However still with psychomotor slowing and is abulic. Also still with impaired executive function and visuospatial skills. 52 y/o M with PMHx uncontrolled DM, HTN, hypothyroidism who presented on 03/04 with slurred speech, left-sided weakness, and fever. MRI brain shows multiple punctate acute infarcts concerning for embolic shower. This suggests the possibility that he may have been bacteremic, with septic emboli to brain and encephalopathy causing seizures. CSF studies unrevealing. However, elevated CSF protein can be seen with viral or immune-mediated encephalitis. EEG showing moderate diffuse cerebral dysfunction, worse over the left hemisphere. AMARI without any evidence of thrombi or vegetations. Neurological exam today somewhat improved, now able to easily spell WORLD backwards and perform serial 7s. However still with psychomotor slowing and is abulic. Also still with impaired executive function and visuospatial skills. MOCA score 23/30 (N >= 26/30).

## 2017-03-08 NOTE — PROGRESS NOTE ADULT - ASSESSMENT
51M PMH HTN, HLD, hypothyroidism, and uncontrolled DM admitted to MICU w/ AMS intubated for airway protection. AMS resolved and extubated 3/6 PM. 52 yo M w/ PMH HTN, HLD, hypothyroidism, and uncontrolled DM admitted to MICU w/ AMS in setting of slurred speech, left sided weakness and fever, intubated for airway protection. Initially worked up for meningitis, now r/o. MRI brain revealing multiple punctate acute infarcts concerning for embolic shower however TTE negative. AMS now resolved and extubated 3/6 PM.

## 2017-03-09 DIAGNOSIS — R73.9 HYPERGLYCEMIA, UNSPECIFIED: ICD-10-CM

## 2017-03-09 LAB
ANION GAP SERPL CALC-SCNC: 8 MMOL/L — LOW (ref 9–16)
APTT BLD: 36.4 SEC — SIGNIFICANT CHANGE UP (ref 27.5–37.4)
BUN SERPL-MCNC: 22 MG/DL — SIGNIFICANT CHANGE UP (ref 7–23)
C3 SERPL-MCNC: 131 MG/DL — SIGNIFICANT CHANGE UP (ref 80–180)
C4 SERPL-MCNC: 33 MG/DL — SIGNIFICANT CHANGE UP (ref 10–45)
CALCIUM SERPL-MCNC: 8.7 MG/DL — SIGNIFICANT CHANGE UP (ref 8.5–10.5)
CHLORIDE SERPL-SCNC: 100 MMOL/L — SIGNIFICANT CHANGE UP (ref 96–108)
CO2 SERPL-SCNC: 31 MMOL/L — SIGNIFICANT CHANGE UP (ref 22–31)
CREAT SERPL-MCNC: 1.3 MG/DL — SIGNIFICANT CHANGE UP (ref 0.5–1.3)
CRP SERPL-MCNC: 1.87 MG/DL — HIGH
CRP SERPL-MCNC: 1.96 MG/DL — HIGH
CULTURE RESULTS: SIGNIFICANT CHANGE UP
CULTURE RESULTS: SIGNIFICANT CHANGE UP
DSDNA AB FLD-ACNC: <0.2 AI — SIGNIFICANT CHANGE UP
ENA SS-A AB FLD IA-ACNC: <0.2 AI — SIGNIFICANT CHANGE UP
ERYTHROCYTE [SEDIMENTATION RATE] IN BLOOD: 37 MM/HR — HIGH
ERYTHROCYTE [SEDIMENTATION RATE] IN BLOOD: 40 MM/HR — HIGH
GLUCOSE SERPL-MCNC: 146 MG/DL — HIGH (ref 70–99)
INR BLD: 0.91 — SIGNIFICANT CHANGE UP (ref 0.88–1.16)
MAGNESIUM SERPL-MCNC: 2.5 MG/DL — HIGH (ref 1.6–2.4)
POTASSIUM SERPL-MCNC: 4 MMOL/L — SIGNIFICANT CHANGE UP (ref 3.5–5.3)
POTASSIUM SERPL-SCNC: 4 MMOL/L — SIGNIFICANT CHANGE UP (ref 3.5–5.3)
PROTHROM AB SERPL-ACNC: 10.1 SEC — SIGNIFICANT CHANGE UP (ref 10–13.1)
RHEUMATOID FACT SERPL-ACNC: <7 IU/ML — SIGNIFICANT CHANGE UP (ref 0–13.9)
SODIUM SERPL-SCNC: 139 MMOL/L — SIGNIFICANT CHANGE UP (ref 135–145)
SPECIMEN SOURCE: SIGNIFICANT CHANGE UP
SPECIMEN SOURCE: SIGNIFICANT CHANGE UP
THYROGLOB AB FLD IA-ACNC: <20 IU/ML — SIGNIFICANT CHANGE UP (ref 0–40)
THYROGLOB AB SERPL-ACNC: <20 IU/ML — SIGNIFICANT CHANGE UP (ref 0–40)
THYROGLOB SERPL-MCNC: 13.1 NG/ML — SIGNIFICANT CHANGE UP (ref 1.6–59.9)
TSH SERPL-MCNC: 3.24 UIU/ML — SIGNIFICANT CHANGE UP (ref 0.35–4.94)

## 2017-03-09 PROCEDURE — 99232 SBSQ HOSP IP/OBS MODERATE 35: CPT

## 2017-03-09 PROCEDURE — 99233 SBSQ HOSP IP/OBS HIGH 50: CPT | Mod: GC

## 2017-03-09 PROCEDURE — 83020 HEMOGLOBIN ELECTROPHORESIS: CPT | Mod: 26

## 2017-03-09 PROCEDURE — 99233 SBSQ HOSP IP/OBS HIGH 50: CPT

## 2017-03-09 RX ORDER — SODIUM CHLORIDE 9 MG/ML
1000 INJECTION INTRAMUSCULAR; INTRAVENOUS; SUBCUTANEOUS
Qty: 0 | Refills: 0 | Status: DISCONTINUED | OUTPATIENT
Start: 2017-03-09 | End: 2017-03-12

## 2017-03-09 RX ADMIN — AMLODIPINE BESYLATE 10 MILLIGRAM(S): 2.5 TABLET ORAL at 06:45

## 2017-03-09 RX ADMIN — Medication 81 MILLIGRAM(S): at 12:04

## 2017-03-09 RX ADMIN — Medication 100 MICROGRAM(S): at 06:44

## 2017-03-09 RX ADMIN — VALSARTAN 320 MILLIGRAM(S): 80 TABLET ORAL at 06:44

## 2017-03-09 RX ADMIN — CARVEDILOL PHOSPHATE 12.5 MILLIGRAM(S): 80 CAPSULE, EXTENDED RELEASE ORAL at 06:44

## 2017-03-09 RX ADMIN — Medication 4: at 02:37

## 2017-03-09 RX ADMIN — CARVEDILOL PHOSPHATE 12.5 MILLIGRAM(S): 80 CAPSULE, EXTENDED RELEASE ORAL at 18:09

## 2017-03-09 RX ADMIN — HEPARIN SODIUM 5000 UNIT(S): 5000 INJECTION INTRAVENOUS; SUBCUTANEOUS at 06:43

## 2017-03-09 RX ADMIN — LEVETIRACETAM 500 MILLIGRAM(S): 250 TABLET, FILM COATED ORAL at 06:44

## 2017-03-09 RX ADMIN — Medication 4: at 13:39

## 2017-03-09 RX ADMIN — Medication 6: at 18:57

## 2017-03-09 RX ADMIN — HEPARIN SODIUM 5000 UNIT(S): 5000 INJECTION INTRAVENOUS; SUBCUTANEOUS at 14:54

## 2017-03-09 RX ADMIN — LEVETIRACETAM 500 MILLIGRAM(S): 250 TABLET, FILM COATED ORAL at 18:09

## 2017-03-09 RX ADMIN — SODIUM CHLORIDE 100 MILLILITER(S): 9 INJECTION INTRAMUSCULAR; INTRAVENOUS; SUBCUTANEOUS at 14:54

## 2017-03-09 NOTE — PROGRESS NOTE ADULT - PROBLEM SELECTOR PLAN 5
LLE ulceration w/ recent staph infxn w/ debridement and wound vac placement. salmon colored macular lesions now less erythematous, no purulence, no warmth  - wound care saw patient, recommends Medihoney to site daily.

## 2017-03-09 NOTE — PROGRESS NOTE ADULT - PROBLEM SELECTOR PLAN 3
BP last 24 hours 133/85 - 164/89. Unclear why patient requiring multiple antihypertensives, will workup secondary hypertension as an outpatient.   - Now on HCTZ 50mg daily, valsartan 320mg daily, amlodipine 10mg daily, Coreg 12.5mg BID. Most of which are home medications.   - Patient HTN better controlled last 24 hours, will continue to monitor closely.

## 2017-03-09 NOTE — PROGRESS NOTE ADULT - ASSESSMENT
Patient is a 50 yo M w/ PMH HTN, HLD, hypothyroidism, and uncontrolled DM admitted to MICU w/ AMS in setting of slurred speech, left sided weakness and fever, intubated for airway protection. Initially worked up for meningitis, now r/o. MRI brain revealing multiple punctate acute infarcts concerning for embolic shower however TTE, AMARI negative. AMS now resolved and extubated 3/6 PM; active issues include HTN, DM control, and workup of septic emboli vs vasculitis vs HTN sequelae

## 2017-03-09 NOTE — PROGRESS NOTE ADULT - PROBLEM SELECTOR PLAN 1
Improving w/ no focal deficits on exam but still with frontal lobe dysfunction on neuro testing, still unclear etiology at this point. Pt on admission in severe sepsis w/ fever and rigors on admission, elevated WBCs, and AMS, concern for meningitis but ruled out after LP. Still with possible encephalitis leading to seizure-like activity vs hypertensive emergency w/ possible acute infarcts seen on MRI vs autoimmune encephalitis given increased protein on CSF. CT head showing diminished attenuation in L woody, CTA head done w/o evidence of critical stenosis, thus low likelihood of stroke. DKA (now resolved) also possibly contributing to AMS. MRI read w/ various acute infarcts concerning for septic vs thrombotic emboli, repeat MRI 3/9 showing inteval new infarct of left putamen. TTE, AMARI neg for vegetations. Now off of antibiotics.   - Continue keppra 500mg PO BID  - Neuro following, recs appreciated

## 2017-03-09 NOTE — PROGRESS NOTE ADULT - ASSESSMENT
52 y/o M with PMHx uncontrolled DM, HTN, hypothyroidism who presented on 03/04 with slurred speech, left-sided weakness, and fever. MRI brain shows multiple punctate acute infarcts concerning for embolic shower. EEG showing moderate diffuse cerebral dysfunction, worse over the left hemisphere. Elevated CSF protein can be seen with viral or immune-mediated encephalitis. AMARI without any evidence of thrombi or vegetations. Neurological exam somewhat improved, however still with psychomotor slowing, abulic, and overall mild cognitive impairment (MOCA score 23/30). 52 y/o M with PMHx uncontrolled DM, HTN, hypothyroidism who presented on 03/04 with slurred speech, left-sided weakness, and fever. Initial MRI brain shows multiple punctate acute infarcts concerning for embolic shower. Repeat with additional acute lacunar infarct. MRA without evidence of thrombosis or vasculitis. EEG showing moderate diffuse cerebral dysfunction, worse over the left hemisphere. Elevated CSF protein can be seen with viral or immune-mediated encephalitis. AMARI without any evidence of thrombi or vegetations. Neurological exam somewhat improved, however still with psychomotor slowing, abulic, and overall mild cognitive impairment (MOCA score 23/30).

## 2017-03-09 NOTE — PROGRESS NOTE ADULT - SUBJECTIVE AND OBJECTIVE BOX
INTERVAL HPI/OVERNIGHT EVENTS:  No acute events overnight. MRI/MRA completed yesterday evening.   He continues to fell that he is back to 100%.     VITAL SIGNS:  T(F): 97.3  HR: 66  BP: 133/85  RR: 16  SpO2: 96%  Wt(kg): --    PHYSICAL EXAM:    General: Well-developed, well nourished, in no acute distress.  Eyes: No scleral icterus or injection  Neurologic:  - Mental Status:  Alert, awake, oriented to person, place, and time; Intact fluency, naming, repetition, and comprehension.  - Cranial Nerves II-XII:    II:  Visual fields are full to confrontation; Pupils are equal, round, and reactive to light.  III, IV, VI:  Extraocular movements are intact without nystagmus.  V:  Facial sensation is intact in the V1-V3 distribution bilaterally.  VII:  Face is symmetric with normal eye closure and smile  VIII:  Hearing is intact to finger rub.  IX, X:  Uvula is midline and soft palate rises symmetrically  XI:  Head turning and shoulder shrug are intact.  XII:  Tongue protrudes in the midline.  - Motor:  Strength is 5/5 throughout.  There is no pronator drift.  Normal muscle bulk and tone throughout.  - Reflexes: Reflexes 2+ and symmetric at the biceps, triceps, brachioradialis, knees, and ankles.  Plantar responses flexor on right and extensor on left.  - Sensory:  Intact to light touch, and joint-position sense throughout.  - Coordination:  Finger-nose-finger and heel-knee-shin intact without dysmetria.  Rapid alternating hand movements intact.  - Gait:  Deferred     MEDICATIONS  (STANDING):  heparin  Injectable 5000Unit(s) SubCutaneous every 8 hours  insulin lispro (HumaLOG) corrective regimen sliding scale  SubCutaneous every 4 hours  amLODIPine   Tablet 10milliGRAM(s) Oral daily  valsartan 320milliGRAM(s) Oral daily  levothyroxine 100MICROGram(s) Oral daily  hydrochlorothiazide   Tablet 50milliGRAM(s) Oral daily  insulin glargine Injectable (LANTUS) 32Unit(s) SubCutaneous at bedtime  atorvastatin 40milliGRAM(s) Oral at bedtime  aspirin  chewable 81milliGRAM(s) Oral daily  carvedilol 12.5milliGRAM(s) Oral every 12 hours  levETIRAcetam 500milliGRAM(s) Oral two times a day    MEDICATIONS  (PRN):      Allergies    No Known Allergies    Intolerances        LABS:                        13.4   8.7   )-----------( 223      ( 08 Mar 2017 06:11 )             38.7     09 Mar 2017 06:34    139    |  100    |  22     ----------------------------<  146    4.0     |  31     |  1.30     Ca    8.7        09 Mar 2017 06:34  Mg     2.5       09 Mar 2017 06:34 NEUROLOGY FOLLOW-UP CONSULT NOTE    CHIEF COMPLAINT:  AMS    INTERVAL HISTORY:  No acute events overnight. MRI/MRA completed yesterday evening.  He continues to feel that he is back to 100%.     REVIEW OF SYSTEMS:  As per HPI, otherwise negative for Constitutional, Eyes, Ears/Nose/Mouth/Throat, Neck, Cardiovascular, Respiratory, Gastrointestinal, Genitourinary, Skin, Endocrine, Musculoskeletal, Psychiatric, and Hematologic/Lymphatic.    MEDICATIONS:  heparin  Injectable 5000Unit(s) SubCutaneous every 8 hours  insulin lispro (HumaLOG) corrective regimen sliding scale  SubCutaneous every 4 hours  amLODIPine   Tablet 10milliGRAM(s) Oral daily  valsartan 320milliGRAM(s) Oral daily  levothyroxine 100MICROGram(s) Oral daily  hydrochlorothiazide   Tablet 50milliGRAM(s) Oral daily  insulin glargine Injectable (LANTUS) 32Unit(s) SubCutaneous at bedtime  atorvastatin 40milliGRAM(s) Oral at bedtime  aspirin  chewable 81milliGRAM(s) Oral daily  carvedilol 12.5milliGRAM(s) Oral every 12 hours  levETIRAcetam 500milliGRAM(s) Oral two times a day  sodium chloride 0.9%. 1000milliLiter(s) IV Continuous <Continuous>    VITAL SIGNS:  Vital Signs Last 24 Hrs  T(C): 36.8, Max: 36.8 (03-09 @ 16:28)  T(F): 98.2, Max: 98.2 (03-09 @ 16:28)  HR: 71 (66 - 72)  BP: 136/75 (133/85 - 148/79)  RR: 17 (16 - 17)  SpO2: 95% (95% - 96%)    PHYSICAL EXAMINATION:  General: Well-developed, well nourished, in no acute distress.  Eyes: Conjunctiva and sclera clear.  Cardiovascular: 2+ distal pulses.  Neurologic:  - Mental Status:  Alert, awake, oriented to person, place, and time; Intact fluency, naming, repetition, and comprehension.  - Cranial Nerves II-XII:    II:  Visual fields are full to confrontation; Pupils are equal, round, and reactive to light.  III, IV, VI:  Extraocular movements are intact without nystagmus.  V:  Facial sensation is intact in the V1-V3 distribution bilaterally.  VII:  Face is symmetric with normal eye closure and smile  VIII:  Hearing is intact to finger rub.  IX, X:  Uvula is midline and soft palate rises symmetrically  XI:  Head turning and shoulder shrug are intact.  XII:  Tongue protrudes in the midline.  - Motor:  Strength is 5/5 throughout.  There is no pronator drift.  Normal muscle bulk and tone throughout.  - Reflexes: Reflexes 2+ and symmetric at the biceps, triceps, brachioradialis, knees, and ankles.  Plantar responses flexor on right and extensor on left.  - Sensory:  Intact to light touch, and joint-position sense throughout.  - Coordination:  Finger-nose-finger and heel-knee-shin intact without dysmetria.  Rapid alternating hand movements intact.  - Gait:  Deferred     LABS:                        13.4   8.7   )-----------( 223      ( 08 Mar 2017 06:11 )             38.7     09 Mar 2017 06:34    139    |  100    |  22     ----------------------------<  146    4.0     |  31     |  1.30     Ca    8.7        09 Mar 2017 06:34  Mg     2.5       09 Mar 2017 06:34    3/4/17  HbA1c 11.9 (H) [4.8-5.6]  Lactate 5.8 (H) [0.5-2 mmol/L]  Urine toxicology screen Negative  ESR 12 [<=20 mm/hr]  CRP 1.19 (H) [<=0.30 mg/dL]  Lactate 3.1 (H) [0.5-2 mmol/L]      RADIOLOGY & ADDITIONAL STUDIES:      3/4/17 NCHCT:  1. Concern for an area of diminished attenuation within the left woody respecting midline which may represent acute infarction. This is seen in the setting of increased attenuation within the basilar artery concerning   for potential thrombus. CTA could be obtained for further characterization of the basilar artery, and brain MR with diffusion-weighted imaging may be helpful for further characterization of the left woody.  2. 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   3. No CT evidence of acute hemorrhage.    3/4/17 CTA Head w/wo contrast:  No significant intracranial stenosis of the [place].    3/4/17 CTA Neck w/wo contrast:  1. No significant left carotid bifurcation stenosis.  2. 35% stenosis at origin of right ICA including calcified and noncalcified atheromatous plaque along posterior wall of proximal right ICA.  3. Left vertebral artery arises directly from aortic arch, normal variant, and more distally at the skull base, concentric narrowing of left vertebral artery.    3/4/2017 MRI Brain w/o contrast:  1. Punctate areas of acute infarction within the right occipital cortex, left thalamus medially, and just superior to the right splenium within the periventricular white matter along the medial edge of the posterior   body of the right lateral ventricle. Given chronic periventricular and subcortical microangiopathic disease with evidence of remote lacunar infarcts in the thalami bilaterally and anterior limb of left internal capsule, these areas of infarction may be on that basis. Other etiologies are not excluded.  2. Absence of CSF suppression in the frontoparietal convexity sulci bilaterally, raises the possibility of meningitis especially given the presence of mild diffuse ventriculomegaly, which is slightly out of proportion to the degree of parenchymal volume loss. Brain MR with contrast may be helpful for further evaluation, as failure of CSF suppression may occur due to technical factors. NEUROLOGY FOLLOW-UP CONSULT NOTE    CHIEF COMPLAINT:  AMS    INTERVAL HISTORY:  No acute events overnight. MRI/MRA completed yesterday evening.  He continues to feel that he is back to 100%.     REVIEW OF SYSTEMS:  As per HPI, otherwise negative for Constitutional, Eyes, Ears/Nose/Mouth/Throat, Neck, Cardiovascular, Respiratory, Gastrointestinal, Genitourinary, Skin, Endocrine, Musculoskeletal, Psychiatric, and Hematologic/Lymphatic.    MEDICATIONS:  heparin  Injectable 5000Unit(s) SubCutaneous every 8 hours  insulin lispro (HumaLOG) corrective regimen sliding scale  SubCutaneous every 4 hours  amLODIPine   Tablet 10milliGRAM(s) Oral daily  valsartan 320milliGRAM(s) Oral daily  levothyroxine 100MICROGram(s) Oral daily  hydrochlorothiazide   Tablet 50milliGRAM(s) Oral daily  insulin glargine Injectable (LANTUS) 32Unit(s) SubCutaneous at bedtime  atorvastatin 40milliGRAM(s) Oral at bedtime  aspirin  chewable 81milliGRAM(s) Oral daily  carvedilol 12.5milliGRAM(s) Oral every 12 hours  levETIRAcetam 500milliGRAM(s) Oral two times a day  sodium chloride 0.9%. 1000milliLiter(s) IV Continuous <Continuous>    VITAL SIGNS:  Vital Signs Last 24 Hrs  T(C): 36.8, Max: 36.8 (03-09 @ 16:28)  T(F): 98.2, Max: 98.2 (03-09 @ 16:28)  HR: 71 (66 - 72)  BP: 136/75 (133/85 - 148/79)  RR: 17 (16 - 17)  SpO2: 95% (95% - 96%)    PHYSICAL EXAMINATION:  General: Well-developed, well nourished, in no acute distress.  Eyes: Conjunctiva and sclera clear.  Cardiovascular: 2+ distal pulses.  Neurologic:  - Mental Status:  Alert, awake, oriented to person, place, and time; Intact fluency, naming, repetition, and comprehension. Recall is 3/3 immediately and 3/3 at 5 minutes. Continues to have difficulty drawing 3-D cube.   - Cranial Nerves II-XII:    II:  Visual fields are full to confrontation; Pupils are equal, round, and reactive to light.  III, IV, VI:  Extraocular movements are intact without nystagmus.  V:  Facial sensation is intact in the V1-V3 distribution bilaterally.  VII:  Face is symmetric with normal eye closure and smile  VIII:  Hearing is intact to finger rub.  IX, X:  Uvula is midline and soft palate rises symmetrically  XI:  Head turning and shoulder shrug are intact.  XII:  Tongue protrudes in the midline.  - Motor:  Strength is 5/5 throughout.  There is no pronator drift.  Normal muscle bulk and tone throughout.  - Reflexes: Reflexes 2+ and symmetric at the biceps, triceps, brachioradialis, knees, and ankles.  Plantar responses flexor on right and extensor on left.  - Sensory:  Intact to light touch, and joint-position sense throughout.  - Coordination:  Finger-nose-finger and heel-knee-shin intact without dysmetria.  Rapid alternating hand movements intact.  - Gait:  Deferred     LABS:                        13.4   8.7   )-----------( 223      ( 08 Mar 2017 06:11 )             38.7     09 Mar 2017 06:34    139    |  100    |  22     ----------------------------<  146    4.0     |  31     |  1.30     Ca    8.7        09 Mar 2017 06:34  Mg     2.5       09 Mar 2017 06:34    3/4/17  HbA1c 11.9 (H) [4.8-5.6]  Lactate 5.8 (H) [0.5-2 mmol/L]  Urine toxicology screen Negative  ESR 12 [<=20 mm/hr]  CRP 1.19 (H) [<=0.30 mg/dL]  Lactate 3.1 (H) [0.5-2 mmol/L]      RADIOLOGY & ADDITIONAL STUDIES:      3/4/17 NCHCT:  1. Concern for an area of diminished attenuation within the left woody respecting midline which may represent acute infarction. This is seen in the setting of increased attenuation within the basilar artery concerning   for potential thrombus. CTA could be obtained for further characterization of the basilar artery, and brain MR with diffusion-weighted imaging may be helpful for further characterization of the left woody.  2. 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   3. No CT evidence of acute hemorrhage.    3/4/17 CTA Head w/wo contrast:  No significant intracranial stenosis of the [place].    3/4/17 CTA Neck w/wo contrast:  1. No significant left carotid bifurcation stenosis.  2. 35% stenosis at origin of right ICA including calcified and noncalcified atheromatous plaque along posterior wall of proximal right ICA.  3. Left vertebral artery arises directly from aortic arch, normal variant, and more distally at the skull base, concentric narrowing of left vertebral artery.    3/4/2017 MRI Brain w/o contrast:  1. Punctate areas of acute infarction within the right occipital cortex, left thalamus medially, and just superior to the right splenium within the periventricular white matter along the medial edge of the posterior   body of the right lateral ventricle. Given chronic periventricular and subcortical microangiopathic disease with evidence of remote lacunar infarcts in the thalami bilaterally and anterior limb of left internal capsule, these areas of infarction may be on that basis. Other etiologies are not excluded.  2. Absence of CSF suppression in the frontoparietal convexity sulci bilaterally, raises the possibility of meningitis especially given the presence of mild diffuse ventriculomegaly, which is slightly out of proportion to the degree of parenchymal volume loss. Brain MR with contrast may be helpful for further evaluation, as failure of CSF suppression may occur due to technical factors. NEUROLOGY FOLLOW-UP CONSULT NOTE    CHIEF COMPLAINT:  AMS    INTERVAL HISTORY:  No acute events overnight. MRI/MRA completed yesterday evening.  He continues to feel that he is back to 100%.     REVIEW OF SYSTEMS:  As per HPI, otherwise negative for Constitutional, Eyes, Ears/Nose/Mouth/Throat, Neck, Cardiovascular, Respiratory, Gastrointestinal, Genitourinary, Skin, Endocrine, Musculoskeletal, Psychiatric, and Hematologic/Lymphatic.    MEDICATIONS:  heparin  Injectable 5000Unit(s) SubCutaneous every 8 hours  insulin lispro (HumaLOG) corrective regimen sliding scale  SubCutaneous every 4 hours  amLODIPine   Tablet 10milliGRAM(s) Oral daily  valsartan 320milliGRAM(s) Oral daily  levothyroxine 100MICROGram(s) Oral daily  hydrochlorothiazide   Tablet 50milliGRAM(s) Oral daily  insulin glargine Injectable (LANTUS) 32Unit(s) SubCutaneous at bedtime  atorvastatin 40milliGRAM(s) Oral at bedtime  aspirin  chewable 81milliGRAM(s) Oral daily  carvedilol 12.5milliGRAM(s) Oral every 12 hours  levETIRAcetam 500milliGRAM(s) Oral two times a day  sodium chloride 0.9%. 1000milliLiter(s) IV Continuous <Continuous>    VITAL SIGNS:  Vital Signs Last 24 Hrs  T(C): 36.8, Max: 36.8 (03-09 @ 16:28)  T(F): 98.2, Max: 98.2 (03-09 @ 16:28)  HR: 71 (66 - 72)  BP: 136/75 (133/85 - 148/79)  RR: 17 (16 - 17)  SpO2: 95% (95% - 96%)    PHYSICAL EXAMINATION:  General: Well-developed, well nourished, in no acute distress.  Eyes: Conjunctiva and sclera clear.  Cardiovascular: 2+ distal pulses.  Neurologic:  - Mental Status:  Alert, awake, oriented to person, place, and time; Intact fluency, naming, repetition, and comprehension. Recall is 3/3 immediately and 3/3 at 5 minutes. Continues to have difficulty drawing 3-D cube.   - Cranial Nerves II-XII:    II:  Visual fields are full to confrontation; Pupils are equal, round, and reactive to light.  III, IV, VI:  Extraocular movements are intact without nystagmus.  V:  Facial sensation is intact in the V1-V3 distribution bilaterally.  VII:  Face is symmetric with normal eye closure and smile  VIII:  Hearing is intact to finger rub.  IX, X:  Uvula is midline and soft palate rises symmetrically  XI:  Head turning and shoulder shrug are intact.  XII:  Tongue protrudes in the midline.  - Motor:  Strength is 5/5 throughout.  There is no pronator drift.  Normal muscle bulk and tone throughout.  - Reflexes: Reflexes 2+ and symmetric at the biceps, triceps, brachioradialis, knees, and ankles.  Plantar responses flexor on right and extensor on left.  - Sensory:  Intact to light touch, and joint-position sense throughout.  - Coordination:  Finger-nose-finger and heel-knee-shin intact without dysmetria.  Rapid alternating hand movements intact.  - Gait:  Deferred     LABS:                        13.4   8.7   )-----------( 223      ( 08 Mar 2017 06:11 )             38.7     09 Mar 2017 06:34    139    |  100    |  22     ----------------------------<  146    4.0     |  31     |  1.30     Ca    8.7        09 Mar 2017 06:34  Mg     2.5       09 Mar 2017 06:34    3/4/17  HbA1c 11.9 (H) [4.8-5.6]  Urine toxicology screen Negative  ESR 12 [<=20 mm/hr]  CRP 1.19 (H) [<=0.30 mg/dL]  TSH 1.130 [0.350-4.940 uIU/mL]  HIV 1/2 Non-reactive    3/4/17  CSF WBC 2/uL  CSF RBC 1/uL  CSF Protein 74 (H) [14-45 mg/dL]  CSF Glucose 230 (H) [40-70 mg/dL]  CSF Culture No growth to date  CSF Gram Stain no organisms or WBCs seen  CSF HSV 1/2 PCR Not Detected  CSF VZV PCR Not Detected  CSF Lactate dehydrogenase 19 U/L  CSF Lyme PCR Negative  CSF VDRL Negative    3/4/17  Rapid RVP Not Detected    3/7/17  LDL 88 [<=100 mg/dL]  HDL 32 (L) [>=40 mg/dL]  Triglycerides 195 (H) [<=150 mg/dL]  Cholesterol 159 [<=200 mg/dL]    3/9/17  TSH 3.244 [0.350-4.940 uIU/mL]  CRP 1.87 (H) [<=0.30 mg/dL] --> 1.96  ESR 37 (H) [<=20 mm/hr] --> 40   Anti SS-A Ab <0.2  Anti SS-B Ab <0.2  RF < 7.0  C3 Complement 131 [ mg/dL]  C4 Complement 33 [10-45 mg/dL]    RADIOLOGY & ADDITIONAL STUDIES:      3/4/17 NCHCT:  1. Concern for an area of diminished attenuation within the left woody respecting midline which may represent acute infarction. This is seen in the setting of increased attenuation within the basilar artery concerning   for potential thrombus. CTA could be obtained for further characterization of the basilar artery, and brain MR with diffusion-weighted imaging may be helpful for further characterization of the left woody.  2. 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   3. No CT evidence of acute hemorrhage.    3/4/17 CTA Head w/wo contrast:  No significant intracranial stenosis of the [place].    3/4/17 CTA Neck w/wo contrast:  1. No significant left carotid bifurcation stenosis.  2. 35% stenosis at origin of right ICA including calcified and noncalcified atheromatous plaque along posterior wall of proximal right ICA.  3. Left vertebral artery arises directly from aortic arch, normal variant, and more distally at the skull base, concentric narrowing of left vertebral artery.    3/5/17 MRI Brain w/o contrast:  1. Punctate areas of acute infarction within the right occipital cortex, left thalamus medially, and just superior to the right splenium within the periventricular white matter along the medial edge of the posterior   body of the right lateral ventricle. Given chronic periventricular and subcortical microangiopathic disease with evidence of remote lacunar infarcts in the thalami bilaterally and anterior limb of left internal capsule, these areas of infarction may be on that basis. Other etiologies are not excluded.  2. Absence of CSF suppression in the frontoparietal convexity sulci bilaterally, raises the possibility of meningitis especially given the presence of mild diffuse ventriculomegaly, which is slightly out of proportion to the degree of parenchymal volume loss. Brain MR with contrast may be helpful for further evaluation, as failure of CSF suppression may occur due to technical factors.    3/6/17 TTE:  There is moderate concentric left ventricular hypertrophy. EF 65-70%. The mitral inflow pattern is consistent with impaired left ventricular relaxation with mildly elevated left atrial pressure 8-14mmHg). No evidence for any hemodynamically significant valvular  disease. There is no pericardial effusion.    3/8/17 AMARI:  There is moderate concentric left ventricular hypertrophy. EF is normal. No clot seen in the left atrium or in the left atrial appendage. Injection of agitated saline contrast documented no interatrial shunt. There is trace to mild aortic regurgitation. There is mild mitral regurgitation. Mild atherosclerotic plaque(s) in the aortic arch. Mild atherosclerotic plaque(s) in the descending aorta. There is no pericardial effusion.    3/8/17 MRI Brain w/wo contrast:   Interval appearance of a left putamen acute lacunar infarction.  Subacute lacunar infarcts in the left thalamus, right posterior splenium of the corpus callosum and right parietal occipital cortex as seen on the   prior study. Nodular enhancement of the right posterior splenium of the corpus callosum still suspicious for a subacute lacunar infarction.  Other etiologies include infection and/or septic embolism.  Nonspecific small patchy and scattered punctate foci of T2 and Flair signal hyperintensities within the white matter.    3/8/17 MRA Head w/o contrast:  Unremarkable MRA examination of the brain. No evidence of large vessel steno-occlusive disease or luminal irregularity to imply medium-large vessel vasculitis.    IMPRESSION:  Mr. Avitia is a 53 y/o M with a PMHx of uncontrolled DM, HTN, and hypothyroidism who presented on 3/4/17 with slurred speech, left-sided weakness, right gaze deviation, and fever.  Initial MRI brain shows multiple punctate acute infarcts concerning for embolic shower.  Repeat brain MRI showing additional acute lacunar infarct.  MRA head without evidence of thrombosis or vasculitis.  EEG showing moderate diffuse cerebral dysfunction, worse over the left hemisphere.  Elevated CSF protein can be seen with viral or immune-mediated encephalitis.  Persistent elevation of serum CRP.  AMARI without any evidence of thrombi or vegetations suggestive of septic emboli.  Neurological exam somewhat improved, however still with psychomotor slowing, abulia, and overall mild cognitive impairment (MOCA score 23/30).  Concern for CNS vasculitis.    PLAN:  1) Recommend cerebral angiogram for workup of possible vasculitis.  2) Follow-up autoimmune and hypercoagulable blood work: MERCEDEZ, SPEP, CH50, Homocysteine, Lipoprotein A, Protein C, Protein S, Factor V assay, Anticardiolipin Ab, ANCA panel, TPO Ab, Thyroglobulin Ab  3) Continue ASA 81 mg and Lipitor 40 mg for secondary stroke prevention.  4) Continue Keppra 500 mg PO BID for seizure prophylaxis.  5) Will follow.

## 2017-03-09 NOTE — PROGRESS NOTE ADULT - SUBJECTIVE AND OBJECTIVE BOX
Surgery/Procedure: Cerebral Angiogram  Consent: Signed by patient                    NAME/NUMBER of HCP:     No Known Allergies      OVERNIGHT EVENTS:    T(C): 35.7, Max: 36.3 (03-09 @ 04:40)  HR: 72 (66 - 72)  BP: 148/79 (133/85 - 164/89)  RR: 16 (16 - 18)  SpO2: 95% (95% - 96%)  Wt(kg): --    EXAM: A&Ox3 speech clear and fluent             CN 2-12 grossly intact             Motor/sensory: No deficits      09 Mar 2017 06:34    139    |  100    |  22     ----------------------------<  146    4.0     |  31     |  1.30     Ca    8.7        09 Mar 2017 06:34  Mg     2.5       09 Mar 2017 06:34      CBC Full  -  ( 08 Mar 2017 06:11 )  WBC Count : 8.7 K/uL  Hemoglobin : 13.4 g/dL  Hematocrit : 38.7 %  Platelet Count - Automated : 223 K/uL  Mean Cell Volume : 82.5 fL  Mean Cell Hemoglobin : 28.6 pg  Mean Cell Hemoglobin Concentration : 34.6 g/dL            CXR: No acute ifiltrates  EKG: NSR  ECHO:  Medical Clearances:  Other Clearances:     Last dose of antiplatelet/anticoagulation drug:    Implanted Devices (pacemaker, drug pump...etc):  []YES   [] NO                  If yes --> EPS consulted to interrogate/adjust device:                 Assessment: 52 M with h/o DM,HTN,HLD, admitted with altered mental status, dysarthria and left sided weakness, now resolved. MRI: new small lacunar infarct, now being worked up for possible CNS vasculitis    Plan: Diagnostic Cerebral Angiogram 3/10/17  NPO after midnight 3/9/17  Pre procedure IV hydration in the setting of DM and slightly increased Creatinine level. Surgery/Procedure: Cerebral Angiogram  Consent: Signed by patient                    NAME/NUMBER of HCP:     No Known Allergies      OVERNIGHT EVENTS: None    T(C): 35.7, Max: 36.3 (03-09 @ 04:40)  HR: 72 (66 - 72)  BP: 148/79 (133/85 - 164/89)  RR: 16 (16 - 18)  SpO2: 95% (95% - 96%)  Wt(kg): -95.2    EXAM: A&Ox3 speech clear and fluent             CN 2-12 grossly intact             Motor/sensory: No deficits      09 Mar 2017 06:34    139    |  100    |  22     ----------------------------<  146    4.0     |  31     |  1.30     Ca    8.7        09 Mar 2017 06:34  Mg     2.5       09 Mar 2017 06:34      CBC Full  -  ( 08 Mar 2017 06:11 )  WBC Count : 8.7 K/uL  Hemoglobin : 13.4 g/dL  Hematocrit : 38.7 %  Platelet Count - Automated : 223 K/uL  Mean Cell Volume : 82.5 fL  Mean Cell Hemoglobin : 28.6 pg  Mean Cell Hemoglobin Concentration : 34.6 g/dL            CXR: No acute ifiltrates  EKG: NSR  ECHO:  Medical Clearances:  Other Clearances:     Last dose of antiplatelet/anticoagulation drug:    Implanted Devices (pacemaker, drug pump...etc):  []YES   [] NO                  If yes --> EPS consulted to interrogate/adjust device:                 Assessment: 52 M with h/o DM,HTN,HLD, admitted with altered mental status, dysarthria and left sided weakness, now resolved. MRI: new small lacunar infarct, now being worked up for possible CNS vasculitis    Plan: Diagnostic Cerebral Angiogram 3/10/17  NPO after midnight 3/9/17  Pre procedure IV hydration in the setting of DM and slightly increased Creatinine level.  Repeat coags/BMP

## 2017-03-09 NOTE — PROGRESS NOTE ADULT - PROBLEM SELECTOR PLAN 2
MRI read w/various acute infarcts concerning for septic vs thrombotic emboli. Specifically, punctate areas of acute infarction within the right occipital cortex, left thalamus medially, and just superior to the right splenium within   the periventricular white matter along the medial edge of the posterior body of the right lateral ventricle. Repeat MRI as above with new left putamen lacunar infarct. TTE/AMARI neg for vegetations. Now off of antibiotics.   - Aspirin 81mg PO QD  - Atorvastatin 40mg PO QHS  - Neuro following, recs appreciated

## 2017-03-09 NOTE — PROGRESS NOTE ADULT - SUBJECTIVE AND OBJECTIVE BOX
INTERVAL HPI/OVERNIGHT EVENTS:  Patient was seen and examined at bedside. As per nurse and patient, no o/n events, patient resting comfortably. Patient went to MRI overnight. No complaints at this time. Patient denies: fever, chills, dizziness, weakness, HA, Changes in vision, CP, palpitations, SOB, cough, N/V/D/C, dysuria, changes in bowel movements, LE edema. ROS otherwise negative.     VITAL SIGNS:  T(F): 96.2  HR: 72  BP: 148/79  RR: 16  SpO2: 95%  Wt(kg): --    PHYSICAL EXAM:    Constitutional: WDWN, NAD  Eyes: PERRL, EOMI, sclera non-icteric, nondeviated uvula, no tongue deviation.  Neck: supple, trachea midline, no masses, no JVD  Respiratory: CTA b/l, good air entry b/l, no wheezing, no rhonchi, no rales, without accessory muscle use and no intercostal retractions  Cardiovascular: RRR, normal S1S2, no M/R/G  Gastrointestinal: soft, NTND, no masses palpable, BS normal  Extremities: Warm, well perfused, pulses equal bilateral upper and lower extremities, no edema, no clubbing. RLE wound covered, clean bandage no strike through.   Neurological: AAOx3, CN Grossly intact  Skin: Normal temperature, warm, dry    MEDICATIONS  (STANDING):  heparin  Injectable 5000Unit(s) SubCutaneous every 8 hours  insulin lispro (HumaLOG) corrective regimen sliding scale  SubCutaneous every 4 hours  amLODIPine   Tablet 10milliGRAM(s) Oral daily  valsartan 320milliGRAM(s) Oral daily  levothyroxine 100MICROGram(s) Oral daily  hydrochlorothiazide   Tablet 50milliGRAM(s) Oral daily  insulin glargine Injectable (LANTUS) 32Unit(s) SubCutaneous at bedtime  atorvastatin 40milliGRAM(s) Oral at bedtime  aspirin  chewable 81milliGRAM(s) Oral daily  carvedilol 12.5milliGRAM(s) Oral every 12 hours  levETIRAcetam 500milliGRAM(s) Oral two times a day    MEDICATIONS  (PRN):      Allergies    No Known Allergies    Intolerances        LABS:                        13.4   8.7   )-----------( 223      ( 08 Mar 2017 06:11 )             38.7     09 Mar 2017 06:34    139    |  100    |  22     ----------------------------<  146    4.0     |  31     |  1.30     Ca    8.7        09 Mar 2017 06:34  Mg     2.5       09 Mar 2017 06:34            RADIOLOGY & ADDITIONAL TESTS:    EXAM:  MR BRAIN WO - W CONTRAST                          *** ADDENDUM 03/09/2017  ***    Other etiologies include infection and/or septic embolism.      *** END OF ADDENDUM 03/09/2017  ***      PROCEDURE DATE:  03/08/2017    Quantity of Contrast inVial in ml: 7.5 Contrast Used: Gadovist  Quantity of Contrast Wasted in ml: 0           INTERPRETATION:    Clinical history:p/w AMS and temp 102F w/ rigors. possible encephalitis   leading to seizure    Technique: MRI of the brain was performed utilizing sagittal and axial   T1, axial T2, axial gradient echo, axial and coronal FLAIR and diffusion   imaging. Following the administration of intravenous gadolinium, sagittal   axial and coronal T1-weighted images were obtained.    Comparison is made to a prior MRI performed on 3/5/2017.    Findings: There has been interval appearance of a small 3 mm punctate   focus of diffusion hyperintensity seen in the left putamen consistent   with acute infarction lacunar. There are acute/subacute infarcts in the   left thalamus, right posterior splenium of the corpus callosum and right   parietal occipital cortex (axial diffusion image #24) seen on the old   study. The 4 mm focus of diffusion hyperintensity in the right splenium   of the corpus callosumdemonstrates nodular enhancement.    There are punctate bilateral thalamic, external capsule and left basal   ganglia/internal capsule chronic lacunar infarcts. There is a right   pontine chronic lacunar infarct. There are small patchy and scattered   punctate foci of T2 and Flair signal hyperintensities within the white   matter that are nonspecific. There is no evidence of mass-effect or   midline shift. There is no evidence of intra or extra-axial fluid   collection. The ventricles are of normal size and caliber.  Evaluation of   the intracranial vascular flow-voids appear within normal limits.    There is a left maxillary polyp versus retention cysts. There is also   left maxillary mucosal thickening. There is mild bilateral ethmoid   mucosal thickening. There are a few scattered opacified mastoid air   cells. The remaining Visualized paranasal sinuses  cells are clear.    Impression: Interval appearance of a left putamen acute lacunar   infarction.     Subacute lacunar infarcts in the left thalamus, right posterior splenium   of the corpus callosum and right parietal occipital cortex as seen on the   prior study. Nodular enhancement of the right posterior splenium of the   corpus callosum still suspicious for a subacute lacunar infarction.     Nonspecific small patchy and scattered punctate foci of T2 and Flair   signal hyperintensities within the white matter; findings may be seen in   patient with migraine headaches, vasculitis, microvascular disease,   demyelinating disease, Lyme disease and viral illness. No abnormal   enhancement to suggest meningitis.          ***Please see the addendum at the top of this report. It may contain   additional important information or changes.****      "Thank you for the opportunity toparticipate in the care of this   patient."        JOANA FABIAN M.D., ATTENDING RADIOLOGIST  This document has been electronically signed. Mar  9 2017  9:47AM  Addend:  JOANA FABIAN M.D., ATTENDING RADIOLOGIST  This addendum was electronically signed on: Mar 9 2017 10:28AM.

## 2017-03-10 ENCOUNTER — TRANSCRIPTION ENCOUNTER (OUTPATIENT)
Age: 52
End: 2017-03-10

## 2017-03-10 LAB
ANA TITR SER: NEGATIVE — SIGNIFICANT CHANGE UP
ANION GAP SERPL CALC-SCNC: 7 MMOL/L — LOW (ref 9–16)
APTT BLD: 37.6 SEC — HIGH (ref 27.5–37.4)
AUTO DIFF PNL BLD: NEGATIVE — SIGNIFICANT CHANGE UP
BLD GP AB SCN SERPL QL: NEGATIVE — SIGNIFICANT CHANGE UP
BUN SERPL-MCNC: 23 MG/DL — SIGNIFICANT CHANGE UP (ref 7–23)
C-ANCA SER-ACNC: NEGATIVE — SIGNIFICANT CHANGE UP
CALCIUM SERPL-MCNC: 8.6 MG/DL — SIGNIFICANT CHANGE UP (ref 8.5–10.5)
CHLORIDE SERPL-SCNC: 103 MMOL/L — SIGNIFICANT CHANGE UP (ref 96–108)
CO2 SERPL-SCNC: 29 MMOL/L — SIGNIFICANT CHANGE UP (ref 22–31)
CREAT SERPL-MCNC: 1.34 MG/DL — HIGH (ref 0.5–1.3)
DRVVT SCREEN TO CONFIRM RATIO: SIGNIFICANT CHANGE UP
DSDNA AB SER-ACNC: <12 IU/ML — SIGNIFICANT CHANGE UP
FACT V ACT/NOR PPP: 222 % — HIGH (ref 70–143)
FACT VIII ACT/NOR PPP: 194 % — HIGH (ref 51–138)
GLUCOSE SERPL-MCNC: 148 MG/DL — HIGH (ref 70–99)
HCT VFR BLD CALC: 38.9 % — LOW (ref 39–50)
HCYS SERPL-MCNC: 19.9 UMOL/L — HIGH (ref 5–15)
HGB BLD-MCNC: 13.3 G/DL — SIGNIFICANT CHANGE UP (ref 13–17)
INR BLD: 0.93 — SIGNIFICANT CHANGE UP (ref 0.88–1.16)
LA NT DPL PPP QL: 20.9 SEC — SIGNIFICANT CHANGE UP
MAGNESIUM SERPL-MCNC: 2.2 MG/DL — SIGNIFICANT CHANGE UP (ref 1.6–2.4)
MCHC RBC-ENTMCNC: 28.5 PG — SIGNIFICANT CHANGE UP (ref 27–34)
MCHC RBC-ENTMCNC: 34.2 G/DL — SIGNIFICANT CHANGE UP (ref 32–36)
MCV RBC AUTO: 83.3 FL — SIGNIFICANT CHANGE UP (ref 80–100)
P-ANCA SER-ACNC: NEGATIVE — SIGNIFICANT CHANGE UP
PLATELET # BLD AUTO: 279 K/UL — SIGNIFICANT CHANGE UP (ref 150–400)
POTASSIUM SERPL-MCNC: 3.7 MMOL/L — SIGNIFICANT CHANGE UP (ref 3.5–5.3)
POTASSIUM SERPL-SCNC: 3.7 MMOL/L — SIGNIFICANT CHANGE UP (ref 3.5–5.3)
PROTHROM AB SERPL-ACNC: 10.3 SEC — SIGNIFICANT CHANGE UP (ref 10–13.1)
RBC # BLD: 4.67 M/UL — SIGNIFICANT CHANGE UP (ref 4.2–5.8)
RBC # FLD: 11.9 % — SIGNIFICANT CHANGE UP (ref 10.3–16.9)
RH IG SCN BLD-IMP: POSITIVE — SIGNIFICANT CHANGE UP
SODIUM SERPL-SCNC: 139 MMOL/L — SIGNIFICANT CHANGE UP (ref 135–145)
THYROPEROXIDASE AB SERPL-ACNC: <10 IU/ML — SIGNIFICANT CHANGE UP (ref 0–34)
TOTAL HEM COMP BLD-ACNC: >62 U/ML — HIGH (ref 42–60)
WBC # BLD: 9.4 K/UL — SIGNIFICANT CHANGE UP (ref 3.8–10.5)
WBC # FLD AUTO: 9.4 K/UL — SIGNIFICANT CHANGE UP (ref 3.8–10.5)

## 2017-03-10 PROCEDURE — 36227 PLACE CATH XTRNL CAROTID: CPT | Mod: 50

## 2017-03-10 PROCEDURE — 99232 SBSQ HOSP IP/OBS MODERATE 35: CPT

## 2017-03-10 PROCEDURE — 99233 SBSQ HOSP IP/OBS HIGH 50: CPT | Mod: GC

## 2017-03-10 PROCEDURE — 36226 PLACE CATH VERTEBRAL ART: CPT | Mod: LT

## 2017-03-10 PROCEDURE — 36224 PLACE CATH CAROTD ART: CPT | Mod: 50

## 2017-03-10 RX ORDER — INSULIN GLARGINE 100 [IU]/ML
39 INJECTION, SOLUTION SUBCUTANEOUS AT BEDTIME
Qty: 0 | Refills: 0 | Status: DISCONTINUED | OUTPATIENT
Start: 2017-03-10 | End: 2017-03-12

## 2017-03-10 RX ORDER — INSULIN LISPRO 100/ML
3 VIAL (ML) SUBCUTANEOUS
Qty: 0 | Refills: 0 | Status: DISCONTINUED | OUTPATIENT
Start: 2017-03-10 | End: 2017-03-12

## 2017-03-10 RX ORDER — POTASSIUM CHLORIDE 20 MEQ
40 PACKET (EA) ORAL ONCE
Qty: 0 | Refills: 0 | Status: COMPLETED | OUTPATIENT
Start: 2017-03-10 | End: 2017-03-10

## 2017-03-10 RX ADMIN — Medication 40 MILLIEQUIVALENT(S): at 07:58

## 2017-03-10 RX ADMIN — LEVETIRACETAM 500 MILLIGRAM(S): 250 TABLET, FILM COATED ORAL at 06:10

## 2017-03-10 RX ADMIN — INSULIN GLARGINE 32 UNIT(S): 100 INJECTION, SOLUTION SUBCUTANEOUS at 00:23

## 2017-03-10 RX ADMIN — VALSARTAN 320 MILLIGRAM(S): 80 TABLET ORAL at 06:10

## 2017-03-10 RX ADMIN — HEPARIN SODIUM 5000 UNIT(S): 5000 INJECTION INTRAVENOUS; SUBCUTANEOUS at 06:11

## 2017-03-10 RX ADMIN — Medication 4: at 00:23

## 2017-03-10 RX ADMIN — HEPARIN SODIUM 5000 UNIT(S): 5000 INJECTION INTRAVENOUS; SUBCUTANEOUS at 00:24

## 2017-03-10 RX ADMIN — Medication 4: at 02:30

## 2017-03-10 RX ADMIN — CARVEDILOL PHOSPHATE 12.5 MILLIGRAM(S): 80 CAPSULE, EXTENDED RELEASE ORAL at 06:11

## 2017-03-10 RX ADMIN — ATORVASTATIN CALCIUM 40 MILLIGRAM(S): 80 TABLET, FILM COATED ORAL at 00:24

## 2017-03-10 RX ADMIN — Medication 100 MICROGRAM(S): at 06:10

## 2017-03-10 RX ADMIN — AMLODIPINE BESYLATE 10 MILLIGRAM(S): 2.5 TABLET ORAL at 06:11

## 2017-03-10 NOTE — PROGRESS NOTE ADULT - SUBJECTIVE AND OBJECTIVE BOX
INTERVAL HPI/OVERNIGHT EVENTS:  Patient was seen and examined at bedside. As per nurse and patient, no o/n events, patient resting comfortably. Lab called overnight to state that [address] PL function and PS free could not be performed. Patient states he slept well and had no issues overnight, did not eat after midnight in preparation for procedure. No complaints at this time. Patient denies: fever, chills, dizziness, weakness, HA, Changes in vision, CP, palpitations, SOB, cough, N/V/D/C, dysuria, changes in bowel movements, LE edema. ROS otherwise negative.    VITAL SIGNS:  T(F): 98.3  HR: 7  BP: 135/70  RR: 18  SpO2: 96%  Wt(kg): --    PHYSICAL EXAM:    Constitutional: WDWN, NAD  Eyes: PERRL, EOMI, sclera non-icteric, nondeviated uvula, no tongue deviation.  Neck: supple, trachea midline, no masses, no JVD  Respiratory: CTA b/l, good air entry b/l, no wheezing, no rhonchi, no rales, without accessory muscle use and no intercostal retractions  Cardiovascular: RRR, normal S1S2, no M/R/G  Gastrointestinal: soft, NTND, no masses palpable, BS normal  Extremities: Warm, well perfused, pulses equal bilateral upper and lower extremities, no edema, no clubbing. RLE wound covered, clean bandage no strike through.   Neurological: AAOx3, CN Grossly intact  Skin: Normal temperature, warm, dry    MEDICATIONS  (STANDING):  heparin  Injectable 5000Unit(s) SubCutaneous every 8 hours  insulin lispro (HumaLOG) corrective regimen sliding scale  SubCutaneous every 4 hours  amLODIPine   Tablet 10milliGRAM(s) Oral daily  valsartan 320milliGRAM(s) Oral daily  levothyroxine 100MICROGram(s) Oral daily  hydrochlorothiazide   Tablet 50milliGRAM(s) Oral daily  insulin glargine Injectable (LANTUS) 32Unit(s) SubCutaneous at bedtime  atorvastatin 40milliGRAM(s) Oral at bedtime  aspirin  chewable 81milliGRAM(s) Oral daily  carvedilol 12.5milliGRAM(s) Oral every 12 hours  levETIRAcetam 500milliGRAM(s) Oral two times a day  sodium chloride 0.9%. 1000milliLiter(s) IV Continuous <Continuous>    MEDICATIONS  (PRN):      Allergies    No Known Allergies    Intolerances        LABS:                        13.3   9.4   )-----------( 279      ( 10 Mar 2017 07:04 )             38.9     09 Mar 2017 06:34    139    |  100    |  22     ----------------------------<  146    4.0     |  31     |  1.30     Ca    8.7        09 Mar 2017 06:34  Mg     2.5       09 Mar 2017 06:34      PT/INR - ( 09 Mar 2017 15:34 )   PT: 10.1 sec;   INR: 0.91          PTT - ( 09 Mar 2017 15:34 )  PTT:36.4 sec      RADIOLOGY & ADDITIONAL TESTS:  Reviewed

## 2017-03-10 NOTE — DISCHARGE NOTE ADULT - MEDICATION SUMMARY - MEDICATIONS TO CHANGE
I will SWITCH the dose or number of times a day I take the medications listed below when I get home from the hospital:    metoprolol succinate 50 mg oral tablet, extended release  -- 1 tab(s) by mouth once a day    Lantus Solostar Pen 100 units/mL subcutaneous solution  -- 30 unit(s) subcutaneous once a day (at bedtime)

## 2017-03-10 NOTE — PROGRESS NOTE ADULT - PROBLEM SELECTOR PLAN 4
HbA1C 11% on admission, with DKA now resolved. Pt required 16 units of coverage, will adjust dose as below  - Lantus 39 units at bedtime, 3 units premeal coverage. Will continue to monitor fingersticks closely  - MISS.

## 2017-03-10 NOTE — PROGRESS NOTE ADULT - ASSESSMENT
Patient is a 50 yo M w/ PMH HTN, HLD, hypothyroidism, and uncontrolled DM admitted to MICU w/ AMS in setting of slurred speech, left sided weakness and fever, intubated for airway protection. Initially worked up for meningitis, now r/o. MRI brain revealing multiple punctate acute infarcts concerning for embolic shower however TTE, AMARI negative. AMS now resolved and extubated 3/6 PM; active issues include HTN, DM control, and workup of septic emboli vs vasculitis vs HTN sequelae. Pending Cerebral angiogram today.

## 2017-03-10 NOTE — DISCHARGE NOTE ADULT - MEDICATION SUMMARY - MEDICATIONS TO TAKE
I will START or STAY ON the medications listed below when I get home from the hospital:    alcohol swabs    -- 1 application to site of injection of lantus  -- Indication: For Diabetes    glucometer  -- 1 glucometer to measure fingersticks once a day  -- Indication: For Diabetes    test strips  -- 1 test strip to measure glucose  -- Indication: For Diabetes    lancet  -- 1 lancet per day to measure blood sugar  -- Indication: For Diabetes    aspirin 81 mg oral tablet, chewable  -- 1 tab(s) by mouth once a day  -- Indication: For Secondary Stroke Prevention    levETIRAcetam 500 mg oral tablet  -- 1 tab(s) by mouth 2 times a day  -- Indication: For Seizure Prophylaxis    insulin glargine 100 units/mL subcutaneous solution  -- 39 unit(s) subcutaneous once (at bedtime) x 1 days  -- Do not drink alcoholic beverages when taking this medication.  It is very important that you take or use this exactly as directed.  Do not skip doses or discontinue unless directed by your doctor.  Keep in refrigerator.  Do not freeze.    -- Indication: For Diabetes    HumaLOG 100 units/mL subcutaneous solution  -- 3 unit(s) subcutaneous 3 times a day (before meals)  -- Indication: For Diabetes    metFORMIN 1000 mg oral tablet, extended release  -- 1 tab(s) by mouth 2 times a day  -- Indication: For Diabetes    atorvastatin 40 mg oral tablet  -- 1 tab(s) by mouth once a day (at bedtime)  -- Indication: For Secondary Stroke Prevention    metoprolol succinate 50 mg oral tablet, extended release  -- 1 tab(s) by mouth once a day  -- Indication: For Hypertension    Norvasc 10 mg oral tablet  -- 1 tab(s) by mouth once a day  -- Indication: For Hypertension    levothyroxine 100 mcg (0.1 mg) oral capsule  -- 1 cap(s) by mouth once a day  -- Indication: For Hypothyroid I will START or STAY ON the medications listed below when I get home from the hospital:    alcohol swabs    -- 1 application to site of injection of lantus  -- Indication: For Diabetes    glucometer  -- 1 glucometer to measure fingersticks once a day  -- Indication: For Diabetes    test strips  -- 1 test strip to measure glucose  -- Indication: For Diabetes    lancet  -- 1 lancet per day to measure blood sugar  -- Indication: For Diabetes    aspirin 81 mg oral tablet, chewable  -- 1 tab(s) by mouth once a day  -- Indication: For Secondary Stroke Prevention    levETIRAcetam 500 mg oral tablet  -- 1 tab(s) by mouth 2 times a day  -- Indication: For Seizure Prophylaxis    insulin glargine 100 units/mL subcutaneous solution  -- 39 unit(s) subcutaneous once (at bedtime) x 1 days  -- Do not drink alcoholic beverages when taking this medication.  It is very important that you take or use this exactly as directed.  Do not skip doses or discontinue unless directed by your doctor.  Keep in refrigerator.  Do not freeze.    -- Indication: For Diabetes    HumaLOG 100 units/mL subcutaneous solution  -- 3 unit(s) subcutaneous 3 times a day (before meals)  -- Indication: For Diabetes    metFORMIN 1000 mg oral tablet, extended release  -- 1 tab(s) by mouth 2 times a day  -- Indication: For Diabetes    atorvastatin 40 mg oral tablet  -- 1 tab(s) by mouth once a day (at bedtime)  -- Indication: For Secondary Stroke Prevention    metoprolol succinate 50 mg oral tablet, extended release  -- 1 tab(s) by mouth once a day  -- Indication: For Hypertension    Coreg 12.5 mg oral tablet  -- 1 tab(s) by mouth every 12 hours  -- Indication: For Hypertension    Norvasc 10 mg oral tablet  -- 1 tab(s) by mouth once a day  -- Indication: For Hypertension    levothyroxine 100 mcg (0.1 mg) oral capsule  -- 1 cap(s) by mouth once a day  -- Indication: For Hypothyroid

## 2017-03-10 NOTE — DISCHARGE NOTE ADULT - NS AS DC STROKE ED MATERIALS
Risk Factors for Stroke/Need for Followup After Discharge/Stroke Warning Signs and Symptoms/Stroke Education Booklet/Call 911 for Stroke/Prescribed Medications

## 2017-03-10 NOTE — PROGRESS NOTE ADULT - PROBLEM SELECTOR PLAN 2
MRI read w/various acute infarcts concerning for septic vs thrombotic emboli. Specifically, punctate areas of acute infarction within the right occipital cortex, left thalamus medially, and just superior to the right splenium within   the periventricular white matter along the medial edge of the posterior body of the right lateral ventricle. Repeat MRI as above with new left putamen lacunar infarct. TTE/AMARI neg for vegetations. Now off of antibiotics.   - Aspirin 81mg PO QD  - Atorvastatin 40mg PO QHS  - Neuro following, recs appreciated  - Cerebral angiogram as above

## 2017-03-10 NOTE — PROGRESS NOTE ADULT - ASSESSMENT
Mr. Avitia is a 51 y/o M with a PMHx of uncontrolled DM, HTN, and hypothyroidism who presented on 3/4/17 with slurred speech, left-sided weakness, right gaze deviation, and fever.  Initial MRI brain shows multiple punctate acute infarcts concerning for embolic shower.  Repeat brain MRI showing additional acute lacunar infarct.  MRA head without evidence of thrombosis or vasculitis.  EEG showing moderate diffuse cerebral dysfunction, worse over the left hemisphere.  Elevated CSF protein can be seen with viral or immune-mediated encephalitis.  Persistent elevation of serum CRP.  AMARI without any evidence of thrombi or vegetations suggestive of septic emboli. Cerebral angiogram without obstructed flow. Neurological exam somewhat improved, however still with psychomotor slowing, abulia, and overall mild cognitive impairment (MOCA score 23/30).  Concern for CNS vasculitis.

## 2017-03-10 NOTE — CONSULT NOTE ADULT - CONSULT REASON
antibiotic recommendations
AMS
Altered mental status
Assessment of cognitive and behavioral functioning
Lt anterior shin healing wound
intracranial hypertension
Stroke code

## 2017-03-10 NOTE — DISCHARGE NOTE ADULT - PATIENT PORTAL LINK FT
“You can access the [facility] Patient Portal, offered by [facility], by registering with the following website: [email or web address]”

## 2017-03-10 NOTE — DISCHARGE NOTE ADULT - CARE PLAN
Principal Discharge DX:	Toxic metabolic encephalopathy  Goal:	Take Keppra as prescribed and follow up with neurologist Dr. Marin  Instructions for follow-up, activity and diet:	You were admitted for altered mental status which may be due to encephalitis, vasculitis, or stroke. Take Keppra 500mg twice daily for seizure prevention. Follow up with neurologist Dr. Marin.  Secondary Diagnosis:	Cerebral infarction  Instructions for follow-up, activity and diet:	Take aspirin 81mg daily and Lipitor 40mg nightly for secondary stroke prevention.  Secondary Diagnosis:	Hypertensive emergency  Instructions for follow-up, activity and diet:	Take hydrochlorothiazide 50mg daily, valsartan 320mg daily, amlodipine 10mg daily, and carvedilol 12.5mg twice daily. Because you require multiple medications for blood pressure control, you should follow up with your primary care physician to figure out if there is an underlying cause for your hypertension.  Secondary Diagnosis:	DKA (diabetic ketoacidoses)  Instructions for follow-up, activity and diet:	Continue the regimen you were stabilized on in the hospital: Lantus 39 units at bedtime and pre-meal Humalog 3 units three times daily prior to meals.  Secondary Diagnosis:	Ulcer of left lower extremity  Instructions for follow-up, activity and diet:	Every day, wash the ulcer with saline, apply over-the-counter Medihoney, and cover with a dressing like Telfa.  Secondary Diagnosis:	Hypothyroid  Instructions for follow-up, activity and diet:	Continue levothyroxine 100 mcg daily. Principal Discharge DX:	Toxic metabolic encephalopathy  Goal:	Take Keppra as prescribed and follow up with neurologist Dr. Marin  Instructions for follow-up, activity and diet:	You were admitted for altered mental status which may be due to encephalitis, vasculitis, or stroke. Take Keppra 500mg twice daily for seizure prevention. Follow up with neurologist Dr. Marin in two weeks.  Secondary Diagnosis:	Cerebral infarction  Instructions for follow-up, activity and diet:	Take aspirin 81mg daily and Lipitor 40mg nightly for secondary stroke prevention.  Secondary Diagnosis:	Hypertensive emergency  Instructions for follow-up, activity and diet:	Take amlodipine 10mg daily, and carvedilol 12.5mg twice daily. Because you require multiple medications for blood pressure control, you should follow up with your primary care physician to figure out if there is an underlying cause for your hypertension.  Secondary Diagnosis:	DKA (diabetic ketoacidoses)  Instructions for follow-up, activity and diet:	Continue the regimen you were stabilized on in the hospital: Lantus 39 units at bedtime and pre-meal Humalog 3 units three times daily prior to meals.  Secondary Diagnosis:	Ulcer of left lower extremity  Instructions for follow-up, activity and diet:	Every day, wash the ulcer with saline, apply over-the-counter Medihoney, and cover with a dressing like Telfa.  Secondary Diagnosis:	Hypothyroid  Instructions for follow-up, activity and diet:	Continue levothyroxine 100 mcg daily.  Secondary Diagnosis:	Acute kidney injury  Instructions for follow-up, activity and diet:	You had a rise in your creatinine, and so two of your hypertension medications were held - hydrochlorothiazide 50mg daily, valsartan 320mg daily. The creatinine was improving on discharge. Please follow up with your PMD in one week to check your kidney function and restart these two medications. Principal Discharge DX:	Toxic metabolic encephalopathy  Goal:	Take Keppra as prescribed and follow up with neurologist Dr. Marin  Instructions for follow-up, activity and diet:	You were admitted for altered mental status which may be due to encephalitis, vasculitis, or stroke. Take Keppra 500mg twice daily for seizure prevention. Follow up with neurologist Dr. Marin in two weeks.  Secondary Diagnosis:	Cerebral infarction  Instructions for follow-up, activity and diet:	Take aspirin 81mg daily and Lipitor 40mg nightly for secondary stroke prevention.  You also had incontinence, it is unclear if this is a urology issue or an issue with the lesions in your brain. After you have a PMD, please follow up with a urologist for the incontinence and a GI doctor for a screening colonoscopy.  Secondary Diagnosis:	Hypertensive emergency  Instructions for follow-up, activity and diet:	Take amlodipine 10mg daily, and carvedilol 12.5mg twice daily. Because you require multiple medications for blood pressure control, you should follow up with your primary care physician to figure out if there is an underlying cause for your hypertension.  Secondary Diagnosis:	DKA (diabetic ketoacidoses)  Instructions for follow-up, activity and diet:	Continue the regimen you were stabilized on in the hospital: Lantus 39 units at bedtime and pre-meal Humalog 3 units three times daily prior to meals.  Secondary Diagnosis:	Ulcer of left lower extremity  Instructions for follow-up, activity and diet:	Every day, wash the ulcer with saline, apply over-the-counter Medihoney, and cover with a dressing like Telfa.  Secondary Diagnosis:	Hypothyroid  Instructions for follow-up, activity and diet:	Continue levothyroxine 100 mcg daily.  Secondary Diagnosis:	Acute kidney injury  Instructions for follow-up, activity and diet:	You had a rise in your creatinine, and so two of your hypertension medications were held - hydrochlorothiazide 50mg daily, valsartan 320mg daily. The creatinine was improving on discharge. Please follow up with your PMD in one week to check your kidney function and restart these two medications.

## 2017-03-10 NOTE — PROGRESS NOTE ADULT - PROBLEM SELECTOR PLAN 3
BP last 24 hours 135/70 - 148/79. Unclear why patient requiring multiple antihypertensives, will workup secondary hypertension as an outpatient.   - Now on HCTZ 50mg daily, valsartan 320mg daily, amlodipine 10mg daily, Coreg 12.5mg BID. Most of which are home medications.   - Patient HTN better controlled last 24 hours, will continue to monitor closely.

## 2017-03-10 NOTE — PROGRESS NOTE ADULT - PROBLEM SELECTOR PLAN 5
LLE ulceration w/ recent staph infxn w/ debridement and wound vac placement. salmon colored macular lesions now less erythematous, no purulence, no warmth  - wound care saw patient, recommends Medihoney to site daily, no increased erythema or signs of infection at this time.

## 2017-03-10 NOTE — PROGRESS NOTE ADULT - PROBLEM SELECTOR PLAN 1
At hospital baseline w/ no focal deficits on exam but still with frontal lobe dysfunction on neuro testing, still unclear etiology at this point. Pt on admission in severe sepsis w/ fever and rigors on admission, elevated WBCs, and AMS, concern for meningitis but ruled out after LP. Still with possible encephalitis leading to seizure-like activity vs hypertensive emergency w/ possible acute infarcts seen on MRI vs autoimmune encephalitis given increased protein on CSF. CT head showing diminished attenuation in L woody, CTA head done w/o evidence of critical stenosis, thus low likelihood of stroke. DKA (now resolved) also possibly contributing to AMS. MRI read w/ various acute infarcts concerning for septic vs thrombotic emboli, repeat MRI 3/9 showing interval new infarct of left putamen. TTE, AMARI neg for vegetations. Now off of antibiotics.   - Continue keppra 500mg PO BID  - Neuro following, recs appreciated  - Pending cerebral angiogram today, f/u neruosurgery recs

## 2017-03-10 NOTE — DISCHARGE NOTE ADULT - SECONDARY DIAGNOSIS.
Cerebral infarction Hypertensive emergency DKA (diabetic ketoacidoses) Ulcer of left lower extremity Hypothyroid Acute kidney injury

## 2017-03-10 NOTE — PROGRESS NOTE ADULT - SUBJECTIVE AND OBJECTIVE BOX
Subjective: Patient seen at bedside, no complaints    T(C): 37.1, Max: 37.1 (03-10 @ 10:46)  HR: 64 (64 - 64)  BP: 147/81 (147/81 - 147/81)  RR: 16 (16 - 16)  SpO2: 93% (93% - 93%)  Wt(kg): --    Exam: NAD, AAOx3.  PERRL. EOMI.  Right groin C/D/I w/ dressing. Pulses 2+ throughout. Left LE w/ leg dressing.  CNs grossly intact. BORDEN x4 w/ good str. Following commands.    CBC Full  -  ( 10 Mar 2017 07:04 )  WBC Count : 9.4 K/uL  Hemoglobin : 13.3 g/dL  Hematocrit : 38.9 %  Platelet Count - Automated : 279 K/uL  Mean Cell Volume : 83.3 fL  Mean Cell Hemoglobin : 28.5 pg  Mean Cell Hemoglobin Concentration : 34.2 g/dL      10 Mar 2017 07:04    139    |  103    |  23     ----------------------------<  148    3.7     |  29     |  1.34     Ca    8.6        10 Mar 2017 07:04  Mg     2.2       10 Mar 2017 07:04      PT/INR - ( 10 Mar 2017 11:24 )   PT: 10.3 sec;   INR: 0.93          PTT - ( 10 Mar 2017 11:24 )  PTT:37.6 sec        Assessment/Plan: 52 male w/ multiple medical problems, stroke like symptoms on admission undergoing work-up now s/p diagnostic cerebral angiogram.    -Post-angio care as ordered,  -D/w groin dressing in AM,  -f/u full angio report, no intervention performed.  -Continue medical care as per primary team/Neurology

## 2017-03-10 NOTE — DISCHARGE NOTE ADULT - HOSPITAL COURSE
51M PMH HTN, HLD, hypothyroidism, and uncontrolled DM started to have stroke like sx followed by AMS on 3/3 PM. Fiance noted at dinner L facial droop w/ slurred speech, L sided weakness, and unsteady gait followed by AMS then intractable vomiting and urinary/fecal incontinence. Recent hx of LLE wound staph infxn previously debrided w/ wound vac placement, lost to f/u. BIBEMS on 3/4 admitted to MICU w/ initial GCS of 7 and obtunded; pt w/ rigors, temp 102F and /180. Pt intubated for airway protection. Stroke code called CT head showing diffuse attenuation of L woody area but CTA head/neck neg for critical stenosis. Pt also found to be in DKA and started on insulin gtt, was able to be bridged to Lantus few hours after gtt initiation. Initiated empiric meningitis tx w/ cefepime/vanc/ampicillin/acyclovir. LP done w/ high opening pressure of 37, little to no WBCs, CSF glucose 230, CSF protein elevated at 74. Negative for bacterial meningitis, neg HSV. CSF Cx/UCx/BCx neg. RVP neg. HIV and HSV neg. Unable to perform Acid Fast on CSF. Antimicrobials de-escalated given findings but kept on CTX/vanc as per Neuro. MRI brain w/o contrast done as pt initially w/ BRINA (pre-renal, now resolved s/p IVF) found to have various acute infarcts concerning for septic vs thrombotic emboli, also showing mild diffuse ventriculomegaly. No vegetations noted on TTE. EEG negative but patient will continue on Keppra prophylactically. AMARI also negative for vegetations. Repeat MRI 3/9 showing interval new infarct of left putamen, so pt underwent cerebral angiogram to assess vessels on 3/10. Cerebral angiogram showed _____ and patient was advised to have follow up and continue with _____. 51M PMH HTN, HLD, hypothyroidism, and uncontrolled DM started to have stroke like sx followed by AMS on 3/3 PM. Fiance noted at dinner L facial droop w/ slurred speech, L sided weakness, and unsteady gait followed by AMS then intractable vomiting and urinary/fecal incontinence. Recent hx of LLE wound staph infxn previously debrided w/ wound vac placement, lost to f/u. BIBEMS on 3/4 admitted to MICU w/ initial GCS of 7 and obtunded; pt w/ rigors, temp 102F and /180. Pt intubated for airway protection. Stroke code called CT head showing diffuse attenuation of L woody area but CTA head/neck neg for critical stenosis. Pt also found to be in DKA and started on insulin gtt, was able to be bridged to Lantus few hours after gtt initiation. Initiated empiric meningitis tx w/ cefepime/vanc/ampicillin/acyclovir. LP done w/ high opening pressure of 37, little to no WBCs, CSF glucose 230, CSF protein elevated at 74. Negative for bacterial meningitis, neg HSV. CSF Cx/UCx/BCx neg. RVP neg. HIV and HSV neg. Unable to perform Acid Fast on CSF. Antimicrobials de-escalated given findings but kept on CTX/vanc as per Neuro. MRI brain w/o contrast done as pt initially w/ BRINA (pre-renal, now resolved s/p IVF) found to have various acute infarcts concerning for septic vs thrombotic emboli, also showing mild diffuse ventriculomegaly. No vegetations noted on TTE. EEG negative but patient will continue on Keppra prophylactically. AMARI also negative for vegetations. Repeat MRI 3/9 showing interval new infarct of left putamen, so pt underwent cerebral angiogram to assess vessels on 3/10. Cerebral angiogram within normal limits, and patient was advised to have follow up with Dr. Marin, neurologist as an outpatient, and continue on Keppra and secondary stroke prevention medications. Patient had BRINA on 3/11 and so valsartan and HCTZ held, creatinine improving and so patient will continue to hold those medications and restart as an outpatient.

## 2017-03-10 NOTE — DISCHARGE NOTE ADULT - MEDICATION SUMMARY - MEDICATIONS TO STOP TAKING
I will STOP taking the medications listed below when I get home from the hospital:    chlorproMAZINE 25 mg oral tablet  -- 3 tab(s) by mouth once a day as needed  -- Avoid prolonged or excessive exposure to direct and/or artificial sunlight while taking this medication.  Do not take dairy products, antacids, or iron preparations within one hour of this medication.  It is very important that you take or use this exactly as directed.  Do not skip doses or discontinue unless directed by your doctor.  May cause drowsiness.  Alcohol may intensify this effect.  Use care when operating dangerous machinery.  Obtain medical advice before taking any non-prescription drugs as some may affect the action of this medication.    pravastatin 10 mg oral tablet  -- 1 tab(s) by mouth once a day  -- Do not take this drug if you are pregnant.  It is very important that you take or use this exactly as directed.  Do not skip doses or discontinue unless directed by your doctor.  Obtain medical advice before taking any non-prescription drugs as some may affect the action of this medication.  Take with food or milk.    valsartan-hydroCHLOROthiazide 320mg-25mg oral tablet  -- 1 tab(s) by mouth once a day    zolpidem 5 mg oral tablet  -- 1 tab(s) by mouth once a day (at bedtime), As needed, Insomnia    cephalexin 500 mg oral capsule  -- 1 cap(s) by mouth 4 times a day

## 2017-03-10 NOTE — DISCHARGE NOTE ADULT - PLAN OF CARE
You were admitted for altered mental status which may be due to encephalitis, vasculitis, or stroke. Take Keppra 500mg twice daily for seizure prevention. Follow up with neurologist Dr. Marin. Take aspirin 81mg daily and Lipitor 40mg nightly for secondary stroke prevention. Take hydrochlorothiazide 50mg daily, valsartan 320mg daily, amlodipine 10mg daily, and carvedilol 12.5mg twice daily. Because you require multiple medications for blood pressure control, you should follow up with your primary care physician to figure out if there is an underlying cause for your hypertension. Continue the regimen you were stabilized on in the hospital: Lantus 39 units at bedtime and pre-meal Humalog 3 units three times daily prior to meals. Continue levothyroxine 100 mcg daily. Every day, wash the ulcer with saline, apply over-the-counter Medihoney, and cover with a dressing like Telfa. Take Keppra as prescribed and follow up with neurologist Dr. Marin You had a rise in your creatinine, and so two of your hypertension medications were held - hydrochlorothiazide 50mg daily, valsartan 320mg daily. The creatinine was improving on discharge. Please follow up with your PMD in one week to check your kidney function and restart these two medications. You were admitted for altered mental status which may be due to encephalitis, vasculitis, or stroke. Take Keppra 500mg twice daily for seizure prevention. Follow up with neurologist Dr. Marin in two weeks. Take amlodipine 10mg daily, and carvedilol 12.5mg twice daily. Because you require multiple medications for blood pressure control, you should follow up with your primary care physician to figure out if there is an underlying cause for your hypertension. Take aspirin 81mg daily and Lipitor 40mg nightly for secondary stroke prevention.  You also had incontinence, it is unclear if this is a urology issue or an issue with the lesions in your brain. After you have a PMD, please follow up with a urologist for the incontinence and a GI doctor for a screening colonoscopy.

## 2017-03-10 NOTE — DISCHARGE NOTE ADULT - CARE PROVIDER_API CALL
Arlene Marin (MD), Neurology  [address] [address]  Phone: [phone]  Fax: [phone] rAlene Marin (MD), Neurology  [address] [address]  Phone: [phone]  Fax: [phone]

## 2017-03-10 NOTE — CONSULT NOTE ADULT - CONSULT REQUESTED DATE/TIME
04-Mar-2017
04-Mar-2017 14:45
04-Mar-2017 17:00
05-Mar-2017
07-Mar-2017 18:53
08-Mar-2017
06-Mar-2017

## 2017-03-10 NOTE — PROGRESS NOTE ADULT - PROBLEM SELECTOR PLAN 1
Cerebral angiogram performed today, with no abnormalities found. At this point, the reason for his presentation is unclear. The areas infarction are not likely to be responsible for any seizure activity. He does have the left leg wound and presented febrile, but blood cultures were negative. Thus septic emboli are less likely. Furthermore, there was no evidence of thrombi or vegetations on AMARI. At this point, would pursue further outpatient workup that would include CSF studies not included in initial lumbar puncture workup (ie. autommmune studies). Would also be useful to obtain further collateral information as to when he was last performing at his usual functional status, and what exactly that was.

## 2017-03-10 NOTE — PROGRESS NOTE ADULT - SUBJECTIVE AND OBJECTIVE BOX
INTERVAL HPI/OVERNIGHT EVENTS:  No acute events. To go for cerebral angiogram today.     REVIEW OF SYSTEMS:    As per HPI, otherwise negative for Constitutional, Eyes, Ears/Nose/Mouth/Throat, Neck, Cardiovascular, Respiratory, Gastrointestinal, Genitourinary, Skin, Endocrine, Musculoskeletal, Psychiatric, and Hematologic/Lymphatic.    VITAL SIGNS:  T(F): 98.7  HR: 64  BP: 147/81  RR: 16  SpO2: 93%  Wt(kg): --    PHYSICAL EXAM:    General: Well-developed, well nourished, in no acute distress.  Eyes: Conjunctiva and sclera clear.  Cardiovascular: 2+ distal pulses.  Neurologic:  - Mental Status:  Alert, awake, oriented to person, place, and time; Intact fluency, naming, repetition, and comprehension. Recall is 3/3 immediately and 3/3 at 5 minutes. Continues to have difficulty drawing 3-D cube.   - Cranial Nerves II-XII:    II:  Visual fields are full to confrontation; Pupils are equal, round, and reactive to light.  III, IV, VI:  Extraocular movements are intact without nystagmus.  V:  Facial sensation is intact in the V1-V3 distribution bilaterally.  VII:  Face is symmetric with normal eye closure and smile  VIII:  Hearing is intact to finger rub.  IX, X:  Uvula is midline and soft palate rises symmetrically  XI:  Head turning and shoulder shrug are intact.  XII:  Tongue protrudes in the midline.  - Motor:  Strength is 5/5 throughout.  There is no pronator drift.  Normal muscle bulk and tone throughout.  - Reflexes: Reflexes 2+ and symmetric at the biceps, triceps, brachioradialis, knees, and ankles.  Plantar responses flexor on right and extensor on left.  - Sensory:  Intact to light touch, and joint-position sense throughout.  - Coordination:  Finger-nose-finger and heel-knee-shin intact without dysmetria.  Rapid alternating hand movements intact.  - Gait:  Deferred       MEDICATIONS  (STANDING):  heparin  Injectable 5000Unit(s) SubCutaneous every 8 hours  insulin lispro (HumaLOG) corrective regimen sliding scale  SubCutaneous every 4 hours  amLODIPine   Tablet 10milliGRAM(s) Oral daily  valsartan 320milliGRAM(s) Oral daily  levothyroxine 100MICROGram(s) Oral daily  hydrochlorothiazide   Tablet 50milliGRAM(s) Oral daily  atorvastatin 40milliGRAM(s) Oral at bedtime  aspirin  chewable 81milliGRAM(s) Oral daily  carvedilol 12.5milliGRAM(s) Oral every 12 hours  levETIRAcetam 500milliGRAM(s) Oral two times a day  sodium chloride 0.9%. 1000milliLiter(s) IV Continuous <Continuous>  insulin glargine Injectable (LANTUS) 39Unit(s) SubCutaneous at bedtime  insulin lispro Injectable (HumaLOG) 3Unit(s) SubCutaneous three times a day before meals    MEDICATIONS  (PRN):      Allergies    No Known Allergies    Intolerances        LABS:                        13.3   9.4   )-----------( 279      ( 10 Mar 2017 07:04 )             38.9     10 Mar 2017 07:04    139    |  103    |  23     ----------------------------<  148    3.7     |  29     |  1.34     Ca    8.6        10 Mar 2017 07:04  Mg     2.2       10 Mar 2017 07:04      PT/INR - ( 10 Mar 2017 11:24 )   PT: 10.3 sec;   INR: 0.93          PTT - ( 10 Mar 2017 11:24 )  PTT:37.6 sec      RADIOLOGY & ADDITIONAL TESTS:    3/4/17 NCHCT:  1. Concern for an area of diminished attenuation within the left woody respecting midline which may represent acute infarction. This is seen in the setting of increased attenuation within the basilar artery concerning   for potential thrombus. CTA could be obtained for further characterization of the basilar artery, and brain MR with diffusion-weighted imaging may be helpful for further characterization of the left woody.  2. 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   3. No CT evidence of acute hemorrhage.    3/4/17 CTA Head w/wo contrast:  No significant intracranial stenosis of the [place].    3/4/17 CTA Neck w/wo contrast:  1. No significant left carotid bifurcation stenosis.  2. 35% stenosis at origin of right ICA including calcified and noncalcified atheromatous plaque along posterior wall of proximal right ICA.  3. Left vertebral artery arises directly from aortic arch, normal variant, and more distally at the skull base, concentric narrowing of left vertebral artery.    3/5/17 MRI Brain w/o contrast:  1. Punctate areas of acute infarction within the right occipital cortex, left thalamus medially, and just superior to the right splenium within the periventricular white matter along the medial edge of the posterior   body of the right lateral ventricle. Given chronic periventricular and subcortical microangiopathic disease with evidence of remote lacunar infarcts in the thalami bilaterally and anterior limb of left internal capsule, these areas of infarction may be on that basis. Other etiologies are not excluded.  2. Absence of CSF suppression in the frontoparietal convexity sulci bilaterally, raises the possibility of meningitis especially given the presence of mild diffuse ventriculomegaly, which is slightly out of proportion to the degree of parenchymal volume loss. Brain MR with contrast may be helpful for further evaluation, as failure of CSF suppression may occur due to technical factors.    3/6/17 TTE:  There is moderate concentric left ventricular hypertrophy. EF 65-70%. The mitral inflow pattern is consistent with impaired left ventricular relaxation with mildly elevated left atrial pressure 8-14mmHg). No evidence for any hemodynamically significant valvular  disease. There is no pericardial effusion.    3/8/17 AMARI:  There is moderate concentric left ventricular hypertrophy. EF is normal. No clot seen in the left atrium or in the left atrial appendage. Injection of agitated saline contrast documented no interatrial shunt. There is trace to mild aortic regurgitation. There is mild mitral regurgitation. Mild atherosclerotic plaque(s) in the aortic arch. Mild atherosclerotic plaque(s) in the descending aorta. There is no pericardial effusion.    3/8/17 MRI Brain w/wo contrast:   Interval appearance of a left putamen acute lacunar infarction.  Subacute lacunar infarcts in the left thalamus, right posterior splenium of the corpus callosum and right parietal occipital cortex as seen on the   prior study. Nodular enhancement of the right posterior splenium of the corpus callosum still suspicious for a subacute lacunar infarction.  Other etiologies include infection and/or septic embolism.  Nonspecific small patchy and scattered punctate foci of T2 and Flair signal hyperintensities within the white matter.    3/8/17 MRA Head w/o contrast:  Unremarkable MRA examination of the brain. No evidence of large vessel steno-occlusive disease or luminal irregularity to imply medium-large vessel vasculitis. NEUROLOGY FOLLOW-UP CONSULT NOTE    CHIEF COMPLAINT:  Altered mental status    INTERVAL HISTORY:  No acute events. To go for cerebral angiogram today.     REVIEW OF SYSTEMS:  As per HPI, otherwise negative for Constitutional, Eyes, Ears/Nose/Mouth/Throat, Neck, Cardiovascular, Respiratory, Gastrointestinal, Genitourinary, Skin, Endocrine, Musculoskeletal, Psychiatric, and Hematologic/Lymphatic.    MEDICATIONS:  heparin  Injectable 5000Unit(s) SubCutaneous every 8 hours  insulin lispro (HumaLOG) corrective regimen sliding scale  SubCutaneous every 4 hours  amLODIPine   Tablet 10milliGRAM(s) Oral daily  valsartan 320milliGRAM(s) Oral daily  levothyroxine 100MICROGram(s) Oral daily  hydrochlorothiazide   Tablet 50milliGRAM(s) Oral daily  atorvastatin 40milliGRAM(s) Oral at bedtime  aspirin  chewable 81milliGRAM(s) Oral daily  carvedilol 12.5milliGRAM(s) Oral every 12 hours  levETIRAcetam 500milliGRAM(s) Oral two times a day  sodium chloride 0.9%. 1000milliLiter(s) IV Continuous <Continuous>  insulin glargine Injectable (LANTUS) 39Unit(s) SubCutaneous at bedtime  insulin lispro Injectable (HumaLOG) 3Unit(s) SubCutaneous three times a day before meals    VITAL SIGNS:  Vital Signs Last 24 Hrs  T(C): 37.1, Max: 37.1 (03-10 @ 10:46)  T(F): 98.7, Max: 98.7 (03-10 @ 10:46)  HR: 64 (7 - 64)  BP: 147/81 (135/70 - 147/81)  RR: 16 (16 - 18)  SpO2: 93% (93% - 96%)    PHYSICAL EXAMINATION:  General: Well-developed, well nourished, in no acute distress.  Eyes: Conjunctiva and sclera clear.  Cardiovascular: 2+ distal pulses.  Neurologic:  - Mental Status:  Alert, awake, oriented to person, place, and time; Intact fluency, naming, repetition, and comprehension. Recall is 3/3 immediately and 3/3 at 5 minutes. Continues to have difficulty drawing 3-D cube.   - Cranial Nerves II-XII:    II:  Visual fields are full to confrontation; Pupils are equal, round, and reactive to light.  III, IV, VI:  Extraocular movements are intact without nystagmus.  V:  Facial sensation is intact in the V1-V3 distribution bilaterally.  VII:  Face is symmetric with normal eye closure and smile  VIII:  Hearing is intact to finger rub.  IX, X:  Uvula is midline and soft palate rises symmetrically  XI:  Head turning and shoulder shrug are intact.  XII:  Tongue protrudes in the midline.  - Motor:  Strength is 5/5 throughout.  There is no pronator drift.  Normal muscle bulk and tone throughout.  - Reflexes: Reflexes 2+ and symmetric at the biceps, triceps, brachioradialis, knees, and ankles.  Plantar responses flexor on right and extensor on left.  - Sensory:  Intact to light touch, and joint-position sense throughout.  - Coordination:  Finger-nose-finger and heel-knee-shin intact without dysmetria.  Rapid alternating hand movements intact.  - Gait:  Deferred     LABS:                        13.3   9.4   )-----------( 279      ( 10 Mar 2017 07:04 )             38.9     139    |  103    |  23     ----------------------------<  148     ( 10 Mar 2017 07:04 )  3.7     |  29     |  1.34     Ca    8.6       ( 10 Mar 2017 07:04 )  Mg     2.2      ( 10 Mar 2017 07:04 )    PT: 10.3 sec;   INR: 0.93;    PTT:37.6 sec    3/4/17  HbA1c 11.9 (H) [4.8-5.6]  Urine toxicology screen Negative  ESR 12 [<=20 mm/hr]  CRP 1.19 (H) [<=0.30 mg/dL]  TSH 1.130 [0.350-4.940 uIU/mL]  HIV 1/2 Non-reactive    3/4/17  CSF WBC 2/uL  CSF RBC 1/uL  CSF Protein 74 (H) [14-45 mg/dL]  CSF Glucose 230 (H) [40-70 mg/dL]  CSF Culture No growth to date  CSF Gram Stain no organisms or WBCs seen  CSF HSV 1/2 PCR Not Detected  CSF VZV PCR Not Detected  CSF Lactate dehydrogenase 19 U/L  CSF Lyme PCR Negative  CSF VDRL Negative    3/4/17  Rapid RVP Not Detected    3/7/17  LDL 88 [<=100 mg/dL]  HDL 32 (L) [>=40 mg/dL]  Triglycerides 195 (H) [<=150 mg/dL]  Cholesterol 159 [<=200 mg/dL]    3/9/17  CRP 1.87 (H) [<=0.30 mg/dL] --> 1.96  ESR 37 (H) [<=20 mm/hr] --> 40   TSH 3.244 [0.350-4.940 uIU/mL]  Thyroglobulin antibodies <20.0  Anti SS-A Ab <0.2  Anti SS-B Ab <0.2  RF < 7.0  c-ANCA Negative  p-ANCA Negative  Atypical ANCA Negative  C3 Complement 131 [ mg/dL]  C4 Complement 33 [10-45 mg/dL]  CH50 >62 (H) [42-60 U/mL]  Factor V assay 222 (H) [%]  Factor VIII Assay 194 (H) [%]  Hexagonal Phase Negative  Lupus anticoagulant Negative    3/10/17  Homocysteine 19.9 (H) [5-15 umol/L]    RADIOLOGY & ADDITIONAL TESTS:    3/4/17 NCHCT:  1. Concern for an area of diminished attenuation within the left woody respecting midline which may represent acute infarction. This is seen in the setting of increased attenuation within the basilar artery concerning   for potential thrombus. CTA could be obtained for further characterization of the basilar artery, and brain MR with diffusion-weighted imaging may be helpful for further characterization of the left woody.  2. 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   3. No CT evidence of acute hemorrhage.    3/4/17 CTA Head w/wo contrast:  No significant intracranial stenosis of the [place].    3/4/17 CTA Neck w/wo contrast:  1. No significant left carotid bifurcation stenosis.  2. 35% stenosis at origin of right ICA including calcified and noncalcified atheromatous plaque along posterior wall of proximal right ICA.  3. Left vertebral artery arises directly from aortic arch, normal variant, and more distally at the skull base, concentric narrowing of left vertebral artery.    3/5/17 MRI Brain w/o contrast:  1. Punctate areas of acute infarction within the right occipital cortex, left thalamus medially, and just superior to the right splenium within the periventricular white matter along the medial edge of the posterior   body of the right lateral ventricle. Given chronic periventricular and subcortical microangiopathic disease with evidence of remote lacunar infarcts in the thalami bilaterally and anterior limb of left internal capsule, these areas of infarction may be on that basis. Other etiologies are not excluded.  2. Absence of CSF suppression in the frontoparietal convexity sulci bilaterally, raises the possibility of meningitis especially given the presence of mild diffuse ventriculomegaly, which is slightly out of proportion to the degree of parenchymal volume loss. Brain MR with contrast may be helpful for further evaluation, as failure of CSF suppression may occur due to technical factors.    3/6/17 TTE:  There is moderate concentric left ventricular hypertrophy. EF 65-70%. The mitral inflow pattern is consistent with impaired left ventricular relaxation with mildly elevated left atrial pressure 8-14mmHg). No evidence for any hemodynamically significant valvular  disease. There is no pericardial effusion.    3/8/17 AMARI:  There is moderate concentric left ventricular hypertrophy. EF is normal. No clot seen in the left atrium or in the left atrial appendage. Injection of agitated saline contrast documented no interatrial shunt. There is trace to mild aortic regurgitation. There is mild mitral regurgitation. Mild atherosclerotic plaque(s) in the aortic arch. Mild atherosclerotic plaque(s) in the descending aorta. There is no pericardial effusion.    3/8/17 MRI Brain w/wo contrast:   Interval appearance of a left putamen acute lacunar infarction.  Subacute lacunar infarcts in the left thalamus, right posterior splenium of the corpus callosum and right parietal occipital cortex as seen on the   prior study. Nodular enhancement of the right posterior splenium of the corpus callosum still suspicious for a subacute lacunar infarction.  Other etiologies include infection and/or septic embolism.  Nonspecific small patchy and scattered punctate foci of T2 and Flair signal hyperintensities within the white matter.    3/8/17 MRA Head w/o contrast:  Unremarkable MRA examination of the brain. No evidence of large vessel steno-occlusive disease or luminal irregularity to imply medium-large vessel vasculitis.    IMPRESSION:  Mr. Avitia is a 53 y/o M with a PMHx of uncontrolled DM, HTN, and hypothyroidism who presented on 3/4/17 with slurred speech, left-sided weakness, right gaze deviation, and fever.  Initial MRI brain shows multiple punctate acute infarcts concerning for embolic shower.  Repeat brain MRI showing additional acute lacunar infarct.  MRA head without evidence of thrombosis or vasculitis.  EEG showing moderate diffuse cerebral dysfunction, worse over the left hemisphere.  Elevated CSF protein can be seen with viral or immune-mediated encephalitis.  Persistent elevation of serum CRP.  AMARI without any evidence of thrombi or vegetations suggestive of septic emboli.  Neurological exam somewhat improved, however still with psychomotor slowing, abulia, and short-term memory impairment.  Concerned for CNS vasculitis; however, cerebral angiogram without obstructed flow.    At this point, would pursue further outpatient work-up that would include CSF studies not included in initial lumbar puncture (ie. autommmune studies).  Would also be useful to obtain further collateral information as to when he was last performing at his usual functional status, and what exactly that was.    PLAN:  1) Consider hematology consult regarding elevated homocysteine, Factor V assay, and Factor VIII assay  2) Follow-up autoimmune and hypercoagulable blood work: MERCEDEZ, SPEP, Lipoprotein A, Protein C, Protein S, Anticardiolipin Ab, TPO Ab  3) Continue ASA 81 mg and Lipitor 40 mg for secondary stroke prevention.  4) Continue Keppra 500 mg PO BID for seizure prophylaxis.  5) Will follow.

## 2017-03-11 DIAGNOSIS — N17.9 ACUTE KIDNEY FAILURE, UNSPECIFIED: ICD-10-CM

## 2017-03-11 LAB
% ALBUMIN: 53.8 % — SIGNIFICANT CHANGE UP
% ALPHA 1: 5.2 % — SIGNIFICANT CHANGE UP
% ALPHA 2: 15.1 % — SIGNIFICANT CHANGE UP
% BETA: 16.1 % — SIGNIFICANT CHANGE UP
% GAMMA: 9.8 % — SIGNIFICANT CHANGE UP
ALBUMIN SERPL ELPH-MCNC: 3 G/DL — LOW (ref 3.6–5.5)
ALBUMIN/GLOB SERPL ELPH: 1.2 RATIO — SIGNIFICANT CHANGE UP
ALPHA1 GLOB SERPL ELPH-MCNC: 0.3 G/DL — SIGNIFICANT CHANGE UP (ref 0.1–0.4)
ALPHA2 GLOB SERPL ELPH-MCNC: 0.8 G/DL — SIGNIFICANT CHANGE UP (ref 0.5–1)
ANA TITR SER: NEGATIVE — SIGNIFICANT CHANGE UP
ANION GAP SERPL CALC-SCNC: 9 MMOL/L — SIGNIFICANT CHANGE UP (ref 9–16)
APPEARANCE UR: CLEAR — SIGNIFICANT CHANGE UP
B-GLOBULIN SERPL ELPH-MCNC: 0.9 G/DL — SIGNIFICANT CHANGE UP (ref 0.5–1)
BACTERIA # UR AUTO: PRESENT /HPF
BASOPHILS NFR BLD AUTO: 0.6 % — SIGNIFICANT CHANGE UP (ref 0–2)
BILIRUB UR-MCNC: NEGATIVE — SIGNIFICANT CHANGE UP
BUN SERPL-MCNC: 22 MG/DL — SIGNIFICANT CHANGE UP (ref 7–23)
CALCIUM SERPL-MCNC: 8.2 MG/DL — LOW (ref 8.5–10.5)
CHLORIDE SERPL-SCNC: 101 MMOL/L — SIGNIFICANT CHANGE UP (ref 96–108)
CHLORIDE UR-SCNC: 87 MMOL/L — SIGNIFICANT CHANGE UP
CO2 SERPL-SCNC: 28 MMOL/L — SIGNIFICANT CHANGE UP (ref 22–31)
COLOR SPEC: YELLOW — SIGNIFICANT CHANGE UP
COMMENT - URINE: SIGNIFICANT CHANGE UP
CREAT ?TM UR-MCNC: 29.6 MG/DL — SIGNIFICANT CHANGE UP
CREAT SERPL-MCNC: 1.41 MG/DL — HIGH (ref 0.5–1.3)
DIFF PNL FLD: (no result)
EOSINOPHIL NFR BLD AUTO: 4.6 % — SIGNIFICANT CHANGE UP (ref 0–6)
EPI CELLS # UR: SIGNIFICANT CHANGE UP /HPF
GAMMA GLOBULIN: 0.5 G/DL — LOW (ref 0.6–1.6)
GLUCOSE SERPL-MCNC: 129 MG/DL — HIGH (ref 70–99)
GLUCOSE UR QL: 500
HCT VFR BLD CALC: 38.4 % — LOW (ref 39–50)
HGB BLD-MCNC: 13.2 G/DL — SIGNIFICANT CHANGE UP (ref 13–17)
INTERPRETATION SERPL IFE-IMP: SIGNIFICANT CHANGE UP
KETONES UR-MCNC: NEGATIVE — SIGNIFICANT CHANGE UP
LEUKOCYTE ESTERASE UR-ACNC: NEGATIVE — SIGNIFICANT CHANGE UP
LYMPHOCYTES # BLD AUTO: 28.4 % — SIGNIFICANT CHANGE UP (ref 13–44)
MAGNESIUM SERPL-MCNC: 2.2 MG/DL — SIGNIFICANT CHANGE UP (ref 1.6–2.4)
MCHC RBC-ENTMCNC: 28.6 PG — SIGNIFICANT CHANGE UP (ref 27–34)
MCHC RBC-ENTMCNC: 34.4 G/DL — SIGNIFICANT CHANGE UP (ref 32–36)
MCV RBC AUTO: 83.3 FL — SIGNIFICANT CHANGE UP (ref 80–100)
MONOCYTES NFR BLD AUTO: 9.3 % — SIGNIFICANT CHANGE UP (ref 2–14)
NEUTROPHILS NFR BLD AUTO: 57.1 % — SIGNIFICANT CHANGE UP (ref 43–77)
NITRITE UR-MCNC: NEGATIVE — SIGNIFICANT CHANGE UP
OSMOLALITY UR: 327 MOSMOL/KG — SIGNIFICANT CHANGE UP (ref 100–650)
PH UR: 6 — SIGNIFICANT CHANGE UP (ref 4–8)
PLATELET # BLD AUTO: 296 K/UL — SIGNIFICANT CHANGE UP (ref 150–400)
POTASSIUM SERPL-MCNC: 3.5 MMOL/L — SIGNIFICANT CHANGE UP (ref 3.5–5.3)
POTASSIUM SERPL-SCNC: 3.5 MMOL/L — SIGNIFICANT CHANGE UP (ref 3.5–5.3)
PROT PATTERN SERPL ELPH-IMP: SIGNIFICANT CHANGE UP
PROT SERPL-MCNC: 5.5 G/DL — LOW (ref 6–8.3)
PROT SERPL-MCNC: 5.5 G/DL — LOW (ref 6–8.3)
PROT UR-MCNC: 30 MG/DL
RBC # BLD: 4.61 M/UL — SIGNIFICANT CHANGE UP (ref 4.2–5.8)
RBC # FLD: 12.2 % — SIGNIFICANT CHANGE UP (ref 10.3–16.9)
RBC CASTS # UR COMP ASSIST: < 5 /HPF — SIGNIFICANT CHANGE UP
SODIUM SERPL-SCNC: 138 MMOL/L — SIGNIFICANT CHANGE UP (ref 135–145)
SODIUM UR-SCNC: 71 MMOL/L — SIGNIFICANT CHANGE UP
SP GR SPEC: 1.01 — SIGNIFICANT CHANGE UP (ref 1–1.03)
UROBILINOGEN FLD QL: 0.2 E.U./DL — SIGNIFICANT CHANGE UP
UUN UR-MCNC: 243 MG/DL — SIGNIFICANT CHANGE UP
WBC # BLD: 8.9 K/UL — SIGNIFICANT CHANGE UP (ref 3.8–10.5)
WBC # FLD AUTO: 8.9 K/UL — SIGNIFICANT CHANGE UP (ref 3.8–10.5)
WBC UR QL: < 5 /HPF — SIGNIFICANT CHANGE UP

## 2017-03-11 PROCEDURE — 99232 SBSQ HOSP IP/OBS MODERATE 35: CPT

## 2017-03-11 RX ORDER — CARVEDILOL PHOSPHATE 80 MG/1
25 CAPSULE, EXTENDED RELEASE ORAL EVERY 12 HOURS
Qty: 0 | Refills: 0 | Status: DISCONTINUED | OUTPATIENT
Start: 2017-03-11 | End: 2017-03-11

## 2017-03-11 RX ORDER — CARVEDILOL PHOSPHATE 80 MG/1
25 CAPSULE, EXTENDED RELEASE ORAL
Qty: 0 | Refills: 0 | Status: DISCONTINUED | OUTPATIENT
Start: 2017-03-11 | End: 2017-03-11

## 2017-03-11 RX ORDER — CARVEDILOL PHOSPHATE 80 MG/1
12.5 CAPSULE, EXTENDED RELEASE ORAL EVERY 12 HOURS
Qty: 0 | Refills: 0 | Status: DISCONTINUED | OUTPATIENT
Start: 2017-03-12 | End: 2017-03-12

## 2017-03-11 RX ORDER — POTASSIUM CHLORIDE 20 MEQ
40 PACKET (EA) ORAL ONCE
Qty: 0 | Refills: 0 | Status: COMPLETED | OUTPATIENT
Start: 2017-03-11 | End: 2017-03-11

## 2017-03-11 RX ADMIN — Medication 3 UNIT(S): at 18:18

## 2017-03-11 RX ADMIN — Medication 3 UNIT(S): at 12:19

## 2017-03-11 RX ADMIN — Medication 3 UNIT(S): at 08:29

## 2017-03-11 RX ADMIN — HEPARIN SODIUM 5000 UNIT(S): 5000 INJECTION INTRAVENOUS; SUBCUTANEOUS at 22:35

## 2017-03-11 RX ADMIN — Medication 4: at 12:19

## 2017-03-11 RX ADMIN — Medication 40 MILLIEQUIVALENT(S): at 12:18

## 2017-03-11 RX ADMIN — INSULIN GLARGINE 39 UNIT(S): 100 INJECTION, SOLUTION SUBCUTANEOUS at 22:35

## 2017-03-11 RX ADMIN — INSULIN GLARGINE 39 UNIT(S): 100 INJECTION, SOLUTION SUBCUTANEOUS at 00:16

## 2017-03-11 RX ADMIN — HEPARIN SODIUM 5000 UNIT(S): 5000 INJECTION INTRAVENOUS; SUBCUTANEOUS at 14:49

## 2017-03-11 RX ADMIN — CARVEDILOL PHOSPHATE 12.5 MILLIGRAM(S): 80 CAPSULE, EXTENDED RELEASE ORAL at 07:35

## 2017-03-11 RX ADMIN — LEVETIRACETAM 500 MILLIGRAM(S): 250 TABLET, FILM COATED ORAL at 07:35

## 2017-03-11 RX ADMIN — CARVEDILOL PHOSPHATE 12.5 MILLIGRAM(S): 80 CAPSULE, EXTENDED RELEASE ORAL at 00:15

## 2017-03-11 RX ADMIN — Medication 4: at 18:18

## 2017-03-11 RX ADMIN — LEVETIRACETAM 500 MILLIGRAM(S): 250 TABLET, FILM COATED ORAL at 18:17

## 2017-03-11 RX ADMIN — ATORVASTATIN CALCIUM 40 MILLIGRAM(S): 80 TABLET, FILM COATED ORAL at 22:33

## 2017-03-11 RX ADMIN — CARVEDILOL PHOSPHATE 25 MILLIGRAM(S): 80 CAPSULE, EXTENDED RELEASE ORAL at 18:17

## 2017-03-11 RX ADMIN — Medication 81 MILLIGRAM(S): at 12:18

## 2017-03-11 RX ADMIN — HEPARIN SODIUM 5000 UNIT(S): 5000 INJECTION INTRAVENOUS; SUBCUTANEOUS at 00:16

## 2017-03-11 RX ADMIN — Medication 2: at 00:17

## 2017-03-11 RX ADMIN — Medication 2: at 22:33

## 2017-03-11 RX ADMIN — AMLODIPINE BESYLATE 10 MILLIGRAM(S): 2.5 TABLET ORAL at 07:35

## 2017-03-11 RX ADMIN — Medication 100 MICROGRAM(S): at 07:35

## 2017-03-11 RX ADMIN — ATORVASTATIN CALCIUM 40 MILLIGRAM(S): 80 TABLET, FILM COATED ORAL at 00:16

## 2017-03-11 RX ADMIN — VALSARTAN 320 MILLIGRAM(S): 80 TABLET ORAL at 07:35

## 2017-03-11 RX ADMIN — HEPARIN SODIUM 5000 UNIT(S): 5000 INJECTION INTRAVENOUS; SUBCUTANEOUS at 07:35

## 2017-03-11 RX ADMIN — LEVETIRACETAM 500 MILLIGRAM(S): 250 TABLET, FILM COATED ORAL at 00:16

## 2017-03-11 NOTE — PROGRESS NOTE ADULT - ASSESSMENT
Patient is a 50 yo M w/ PMH HTN, HLD, hypothyroidism, uncontrolled DM admitted to MICU w/ AMS w/ slurred speech, left sided weakness and fever MRI brain revealing multiple punctate acute infarcts concerning for embolic shower however AMARI negative, now AMS resolved. workup of septic emboli including Cerebral angiogram negative. Renal function worsening.

## 2017-03-11 NOTE — PROGRESS NOTE ADULT - PROBLEM SELECTOR PLAN 9
- likely due to HCTZ, ARB and angiogram  - UA, ulytes, calc FeNa  - d/c all nephrotoxins  - IV hydration  - renal consult if no improvement tmrw
HSQ    FULL CODE

## 2017-03-11 NOTE — PROGRESS NOTE ADULT - PROBLEM SELECTOR PLAN 8
Hep SQ 5000 units Q8hrs  No indication for stress ulcer prophylaxis at this time    FULL CODE  Dispo: Pending clinical improvement, continue care and management on [facility]
Hep SQ 5000 units Q8hrs  No indication for stress ulcer prophylaxis at this time    FULL CODE  Dispo: Pending clinical improvement, continue care and management on [facility]
Hep SQ 5000 units Q8hrs  No indication for stress ulcer prophylaxis at this time    FULL CODE  Dispo: Pending clinical improvement, continue care and management on [facility]
NPO for AMARI but will restart DASH/TLC diet after.   Replete lytes as needed (K~4, Mg~2)

## 2017-03-11 NOTE — PROGRESS NOTE ADULT - PROBLEM SELECTOR PLAN 3
- BP better controlled systolic 140-150s  - in light of worsening renal function, stopping HCTZ and ARB  - will monitor and start hydralazine as needed

## 2017-03-11 NOTE — PROGRESS NOTE ADULT - SUBJECTIVE AND OBJECTIVE BOX
INTERVAL HPI/OVERNIGHT EVENTS:  Pt feels well no new complaints.    Patient denies: fever, chills, dizziness, weakness, HA, Changes in vision, CP, palpitations, SOB, cough, N/V/D/C, dysuria, changes in bowel movements, LE edema. ROS otherwise negative.    VITAL SIGNS stable; afebrile.  PHYSICAL EXAM:    Constitutional: middle age  M NAD  Eyes: PERRL, EOMI, sclera non-icteric.  Neck: supple, trachea midline, no masses, no JVD  Respiratory: CTA b/l, good air entry b/l, no wheezing, no rhonchi, no rales, without accessory muscle use and no intercostal retractions  Cardiovascular: RRR, normal S1S2, no M/R/G  Gastrointestinal: soft, NTND, no masses palpable, BS normal  Extremities: Warm, well perfused, pulses equal bilateral upper and lower extremities, no edema, no clubbing. RLE wound covered, clean bandages b/l no strike through.   Neurological: AAOx3, CN Grossly intact  Skin: Normal temperature, warm, dry    MEDICATIONS  (STANDING):  heparin  Injectable 5000Unit(s) SubCutaneous every 8 hours  insulin lispro (HumaLOG) corrective regimen sliding scale  SubCutaneous every 4 hours  amLODIPine   Tablet 10milliGRAM(s) Oral daily  valsartan 320milliGRAM(s) Oral daily  levothyroxine 100MICROGram(s) Oral daily  hydrochlorothiazide   Tablet 50milliGRAM(s) Oral daily  insulin glargine Injectable (LANTUS) 32Unit(s) SubCutaneous at bedtime  atorvastatin 40milliGRAM(s) Oral at bedtime  aspirin  chewable 81milliGRAM(s) Oral daily  carvedilol 12.5milliGRAM(s) Oral every 12 hours  levETIRAcetam 500milliGRAM(s) Oral two times a day  sodium chloride 0.9%. 1000milliLiter(s) IV Continuous <Continuous>    MEDICATIONS  (PRN):      Allergies    No Known Allergies    Intolerances      Labs & radiology reviewed

## 2017-03-11 NOTE — PROGRESS NOTE ADULT - SUBJECTIVE AND OBJECTIVE BOX
NEUROLOGY FOLLOW-UP CONSULT NOTE    CHIEF COMPLAINT:  Altered mental status    INTERVAL HISTORY:  Patient was seen and examined at the bedside. Went for cerebral angiogram yesterday with no complications. No overnight events. No complaints today.    REVIEW OF SYSTEMS:  As per HPI, otherwise negative for Constitutional, Eyes, Ears/Nose/Mouth/Throat, Neck, Cardiovascular, Respiratory, Gastrointestinal, Genitourinary, Skin, Endocrine, Musculoskeletal, Psychiatric, and Hematologic/Lymphatic.    MEDICATIONS:  heparin  Injectable 5000Unit(s) SubCutaneous every 8 hours  insulin lispro (HumaLOG) corrective regimen sliding scale  SubCutaneous every 4 hours  amLODIPine   Tablet 10milliGRAM(s) Oral daily  valsartan 320milliGRAM(s) Oral daily  levothyroxine 100MICROGram(s) Oral daily  atorvastatin 40milliGRAM(s) Oral at bedtime  aspirin  chewable 81milliGRAM(s) Oral daily  levETIRAcetam 500milliGRAM(s) Oral two times a day  sodium chloride 0.9%. 1000milliLiter(s) IV Continuous <Continuous>  insulin glargine Injectable (LANTUS) 39Unit(s) SubCutaneous at bedtime  insulin lispro Injectable (HumaLOG) 3Unit(s) SubCutaneous three times a day before meals  carvedilol 25milliGRAM(s) Oral <User Schedule>    VITAL SIGNS:  T(C): 36.6, Max: 36.9 (03-10 @ 21:48)  T(F): 97.8, Max: 98.4 (03-10 @ 21:48)  HR: 71 (71 - 73)  BP: 151/81 (151/81 - 152/85)  RR: 16 (16 - 17)  SpO2: 95% (95% - 95%)    PHYSICAL EXAMINATION:  General: Well-developed, well nourished, man lying in bed and in no acute distress.  Eyes: Conjunctiva and sclera clear.  Cardiovascular: 2+ distal pulses.  Neurologic:  - Mental Status:    Mental Status:  On Brandt Cognitive Assessment (MOCA) testing, Score = 25/30 (N >= 26/30)  Visuospatial/Executive:  Able to perform trail making.  Mild difficulty in copying a 3D rectangle.  Able to draw clock with proper contour and numbers, but not hands. (3/5)  Naming: Able to name lion, rhino, and camel. (3/3)  Attention: Able to repeat list of digits forwards and backwards. (2/2)  Able to correctly tap hand at each letter "A" from a list of letters. (1/1)  Able to perform serial 7 subtraction. (3/3)  Naming:  Able to repeat two sentences. (2/2)  Able to name 12 words (N >= 11) that begin with the letter "S" in one minute. (/1)  Abstraction: Able to correctly state how mode/brandi and pro/rifle are similar. (2/2)  Memory: Able to recall 2/5 words (banana/desk) with no cue. One word (green) with category clue. One word (violin) with MC cue. ()  Orientation: Able to state month, date, year, day, place, and city. ()    - Cranial Nerves II-XII:    II:  Pupils are equal, round, and reactive to light.  III, IV, VI:  Extraocular movements are intact without nystagmus.  V:  Facial sensation is intact in the V1-V3 distribution bilaterally.  VII:  Face is symmetric with normal eye closure and smile  VIII:  Hearing is intact to finger rub.  IX, X:  Uvula is midline and soft palate rises symmetrically  XI:  Head turning and shoulder shrug are intact.  XII:  Tongue protrudes in the midline.  - Motor:  Strength is 5/5 throughout.  There is no pronator drift.  Normal muscle bulk and tone throughout.  - Reflexes:  2+ and symmetric at the biceps, triceps, brachioradialis, knees, and ankles.  Plantar responses flexor.  - Sensory:  Intact to light touch, pin prick, vibration, and joint-position sense throughout.  - Coordination:  Finger-nose-finger and heel-knee-shin intact without dysmetria.  Rapid alternating hand movements intact.  - Gait:   Normal steps, base, arm swing, and turning.     LABS:                          13.2   8.9   )-----------( 296      ( 11 Mar 2017 06:16 )             38.4     11 Mar 2017 06:16    138    |  101    |  22     ----------------------------<  129    3.5     |  28     |  1.41     Ca    8.2        11 Mar 2017 06:16  Mg     2.2       11 Mar 2017 06:16    Urinalysis Basic - ( 11 Mar 2017 13:15 )  Color: Yellow / Appearance: Clear / S.010 / pH: x  Gluc: x / Ketone: NEGATIVE  / Bili: NEGATIVE / Urobili: 0.2 E.U./dL   Blood: x / Protein: 30 mg/dL / Nitrite: NEGATIVE   Leuk Esterase: NEGATIVE / RBC: < 5 /HPF / WBC < 5 /HPF   Sq Epi: x / Non Sq Epi: Few /HPF / Bacteria: Present /HPF    3/4/17  HbA1c 11.9 (H) [4.8-5.6]  Urine toxicology screen Negative  ESR 12 [<=20 mm/hr]  CRP 1.19 (H) [<=0.30 mg/dL]  TSH 1.130 [0.350-4.940 uIU/mL]  HIV 1/2 Non-reactive    3/4/17  CSF WBC 2/uL  CSF RBC 1/uL  CSF Protein 74 (H) [14-45 mg/dL]  CSF Glucose 230 (H) [40-70 mg/dL]  CSF Culture No growth to date  CSF Gram Stain no organisms or WBCs seen  CSF HSV 1/2 PCR Not Detected  CSF VZV PCR Not Detected  CSF Lactate dehydrogenase 19 U/L  CSF Lyme PCR Negative  CSF VDRL Negative    3/4/17  Rapid RVP Not Detected    3/7/17  LDL 88 [<=100 mg/dL]  HDL 32 (L) [>=40 mg/dL]  Triglycerides 195 (H) [<=150 mg/dL]  Cholesterol 159 [<=200 mg/dL]    3/9/17  CRP 1.87 (H) [<=0.30 mg/dL] --> 1.96  ESR 37 (H) [<=20 mm/hr] --> 40   TSH 3.244 [0.350-4.940 uIU/mL]  Thyroglobulin antibodies <20.0  MERCEDEZ Negative  Anti SS-A Ab <0.2  Anti SS-B Ab <0.2  RF < 7.0  c-ANCA Negative  p-ANCA Negative  Atypical ANCA Negative  SPEP Normal  Immunofixation No Monoclonal Band identified  C3 Complement 131 [ mg/dL]  C4 Complement 33 [10-45 mg/dL]  CH50 >62 (H) [42-60 U/mL]  Factor V assay 222 (H) [%]  Factor VIII Assay 194 (H) [%]  Hexagonal Phase Negative  Lupus anticoagulant Negative    3/10/17  Homocysteine 19.9 (H) [5-15 umol/L]    RADIOLOGY & ADDITIONAL TESTS:    3/4/17 NCHCT:  1. Concern for an area of diminished attenuation within the left woody respecting midline which may represent acute infarction. This is seen in the setting of increased attenuation within the basilar artery concerning   for potential thrombus. CTA could be obtained for further characterization of the basilar artery, and brain MR with diffusion-weighted imaging may be helpful for further characterization of the left woody.  2. 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   3. No CT evidence of acute hemorrhage.    3/4/17 CTA Head w/wo contrast:  No significant intracranial stenosis of the [place].    3/4/17 CTA Neck w/wo contrast:  1. No significant left carotid bifurcation stenosis.  2. 35% stenosis at origin of right ICA including calcified and noncalcified atheromatous plaque along posterior wall of proximal right ICA.  3. Left vertebral artery arises directly from aortic arch, normal variant, and more distally at the skull base, concentric narrowing of left vertebral artery.    3/5/17 MRI Brain w/o contrast:  1. Punctate areas of acute infarction within the right occipital cortex, left thalamus medially, and just superior to the right splenium within the periventricular white matter along the medial edge of the posterior   body of the right lateral ventricle. Given chronic periventricular and subcortical microangiopathic disease with evidence of remote lacunar infarcts in the thalami bilaterally and anterior limb of left internal capsule, these areas of infarction may be on that basis. Other etiologies are not excluded.  2. Absence of CSF suppression in the frontoparietal convexity sulci bilaterally, raises the possibility of meningitis especially given the presence of mild diffuse ventriculomegaly, which is slightly out of proportion to the degree of parenchymal volume loss. Brain MR with contrast may be helpful for further evaluation, as failure of CSF suppression may occur due to technical factors.    3/6/17 TTE:  There is moderate concentric left ventricular hypertrophy. EF 65-70%. The mitral inflow pattern is consistent with impaired left ventricular relaxation with mildly elevated left atrial pressure 8-14mmHg). No evidence for any hemodynamically significant valvular  disease. There is no pericardial effusion.    3/8/17 AMARI:  There is moderate concentric left ventricular hypertrophy. EF is normal. No clot seen in the left atrium or in the left atrial appendage. Injection of agitated saline contrast documented no interatrial shunt. There is trace to mild aortic regurgitation. There is mild mitral regurgitation. Mild atherosclerotic plaque(s) in the aortic arch. Mild atherosclerotic plaque(s) in the descending aorta. There is no pericardial effusion.    3/8/17 MRI Brain w/wo contrast:   Interval appearance of a left putamen acute lacunar infarction.  Subacute lacunar infarcts in the left thalamus, right posterior splenium of the corpus callosum and right parietal occipital cortex as seen on the   prior study. Nodular enhancement of the right posterior splenium of the corpus callosum still suspicious for a subacute lacunar infarction.  Other etiologies include infection and/or septic embolism.  Nonspecific small patchy and scattered punctate foci of T2 and Flair signal hyperintensities within the white matter.    3/8/17 MRA Head w/o contrast:  Unremarkable MRA examination of the brain. No evidence of large vessel steno-occlusive disease or luminal irregularity to imply medium-large vessel vasculitis.    3/10/17 Cerebral angiogram:  No vessel occlusion.    IMPRESSION:  Mr. Avitia is a 51 y/o M with a PMHx of uncontrolled DM, HTN, and hypothyroidism who presented on 3/4/17 with slurred speech, left-sided weakness, right gaze deviation, and fever.  Initial MRI brain shows multiple punctate acute infarcts concerning for embolic shower.  Repeat brain MRI showing additional acute lacunar infarct.  MRA head without evidence of thrombosis or vasculitis.  EEG showing moderate diffuse cerebral dysfunction, worse over the left hemisphere.  Elevated CSF protein can be seen with viral or immune-mediated encephalitis.  Persistent elevation of serum CRP.  AMARI without any evidence of thrombi or vegetations suggestive of septic emboli.  Neurological exam somewhat improved, however still with psychomotor slowing, abulia, and short-term memory impairment.  Concerned for CNS vasculitis; however, cerebral angiogram without obstructed flow.    At this point, would pursue further outpatient work-up that would include CSF studies not included in initial lumbar puncture (ie. autommmune studies).  Would also be useful to obtain further collateral information as to when he was last performing at his usual functional status, and what exactly that was.    PLAN:  1) Consider hematology consult regarding elevated homocysteine, Factor V assay, and Factor VIII assay  2) Follow-up pending blood work:  Lipoprotein A, Protein C, Protein S, Anticardiolipin Ab, TPO Ab, JAK2, MTHFR gene, Prothrombin gene  3) Continue ASA 81 mg and Lipitor 40 mg for secondary stroke prevention.  4) Continue Keppra 500 mg PO BID for seizure prophylaxis.  5) Follow-up with Dr. Marin in 1-2 weeks post-discharge.  6) Please re-consult if additional questions

## 2017-03-11 NOTE — PROGRESS NOTE ADULT - PROBLEM SELECTOR PLAN 2
- neuro consult appreciated  - 1) Consider hematology consult regarding elevated homocysteine, Factor V assay, and Factor VIII assay  2) Follow-up pending blood work:  Lipoprotein A, Protein C, Protein S, Anticardiolipin Ab, TPO Ab, JAK2, MTHFR gene, Prothrombin gene  3) Continue ASA 81 mg and Lipitor 40 mg for secondary stroke prevention.  4) Continue Keppra 500 mg PO BID for seizure prophylaxis.  5) Follow-up with Dr. Marin in 1-2 weeks post-discharge.

## 2017-03-11 NOTE — PROGRESS NOTE ADULT - PROVIDER SPECIALTY LIST ADULT
Infectious Disease
Internal Medicine
MICU
Neurology
Neurosurgery
Neurosurgery
Orthopedics
Vascular Surgery
Internal Medicine

## 2017-03-11 NOTE — PROGRESS NOTE ADULT - PROBLEM SELECTOR PROBLEM 8
Need for prophylactic measure
Nutrition, metabolism, and development symptoms

## 2017-03-12 VITALS
OXYGEN SATURATION: 97 % | HEART RATE: 74 BPM | SYSTOLIC BLOOD PRESSURE: 136 MMHG | DIASTOLIC BLOOD PRESSURE: 84 MMHG | TEMPERATURE: 99 F | RESPIRATION RATE: 17 BRPM

## 2017-03-12 LAB
ANION GAP SERPL CALC-SCNC: 8 MMOL/L — LOW (ref 9–16)
BUN SERPL-MCNC: 19 MG/DL — SIGNIFICANT CHANGE UP (ref 7–23)
CALCIUM SERPL-MCNC: 8.6 MG/DL — SIGNIFICANT CHANGE UP (ref 8.5–10.5)
CHLORIDE SERPL-SCNC: 105 MMOL/L — SIGNIFICANT CHANGE UP (ref 96–108)
CO2 SERPL-SCNC: 29 MMOL/L — SIGNIFICANT CHANGE UP (ref 22–31)
CREAT SERPL-MCNC: 1.37 MG/DL — HIGH (ref 0.5–1.3)
GLUCOSE SERPL-MCNC: 89 MG/DL — SIGNIFICANT CHANGE UP (ref 70–99)
LIDOCAIN SERPL-MCNC: 63 NMOL/L — SIGNIFICANT CHANGE UP
MAGNESIUM SERPL-MCNC: 2.3 MG/DL — SIGNIFICANT CHANGE UP (ref 1.6–2.4)
POTASSIUM SERPL-MCNC: 3.7 MMOL/L — SIGNIFICANT CHANGE UP (ref 3.5–5.3)
POTASSIUM SERPL-SCNC: 3.7 MMOL/L — SIGNIFICANT CHANGE UP (ref 3.5–5.3)
SODIUM SERPL-SCNC: 142 MMOL/L — SIGNIFICANT CHANGE UP (ref 135–145)

## 2017-03-12 PROCEDURE — 83605 ASSAY OF LACTIC ACID: CPT

## 2017-03-12 PROCEDURE — 84157 ASSAY OF PROTEIN OTHER: CPT

## 2017-03-12 PROCEDURE — 80202 ASSAY OF VANCOMYCIN: CPT

## 2017-03-12 PROCEDURE — 87581 M.PNEUMON DNA AMP PROBE: CPT

## 2017-03-12 PROCEDURE — 80061 LIPID PANEL: CPT

## 2017-03-12 PROCEDURE — 85025 COMPLETE CBC W/AUTO DIFF WBC: CPT

## 2017-03-12 PROCEDURE — 84484 ASSAY OF TROPONIN QUANT: CPT

## 2017-03-12 PROCEDURE — 97161 PT EVAL LOW COMPLEX 20 MIN: CPT

## 2017-03-12 PROCEDURE — 87070 CULTURE OTHR SPECIMN AEROBIC: CPT

## 2017-03-12 PROCEDURE — 82550 ASSAY OF CK (CPK): CPT

## 2017-03-12 PROCEDURE — 80307 DRUG TEST PRSMV CHEM ANLYZR: CPT

## 2017-03-12 PROCEDURE — 82570 ASSAY OF URINE CREATININE: CPT

## 2017-03-12 PROCEDURE — 85306 CLOT INHIBIT PROT S FREE: CPT

## 2017-03-12 PROCEDURE — 86038 ANTINUCLEAR ANTIBODIES: CPT

## 2017-03-12 PROCEDURE — 89051 BODY FLUID CELL COUNT: CPT

## 2017-03-12 PROCEDURE — 87799 DETECT AGENT NOS DNA QUANT: CPT

## 2017-03-12 PROCEDURE — 86800 THYROGLOBULIN ANTIBODY: CPT

## 2017-03-12 PROCEDURE — 83090 ASSAY OF HOMOCYSTEINE: CPT

## 2017-03-12 PROCEDURE — 96376 TX/PRO/DX INJ SAME DRUG ADON: CPT

## 2017-03-12 PROCEDURE — 80048 BASIC METABOLIC PNL TOTAL CA: CPT

## 2017-03-12 PROCEDURE — 84540 ASSAY OF URINE/UREA-N: CPT

## 2017-03-12 PROCEDURE — 70551 MRI BRAIN STEM W/O DYE: CPT

## 2017-03-12 PROCEDURE — 70553 MRI BRAIN STEM W/O & W/DYE: CPT

## 2017-03-12 PROCEDURE — 82945 GLUCOSE OTHER FLUID: CPT

## 2017-03-12 PROCEDURE — 70498 CT ANGIOGRAPHY NECK: CPT

## 2017-03-12 PROCEDURE — 96375 TX/PRO/DX INJ NEW DRUG ADDON: CPT | Mod: XU

## 2017-03-12 PROCEDURE — 85652 RBC SED RATE AUTOMATED: CPT

## 2017-03-12 PROCEDURE — 70496 CT ANGIOGRAPHY HEAD: CPT

## 2017-03-12 PROCEDURE — 82010 KETONE BODYS QUAN: CPT

## 2017-03-12 PROCEDURE — 86334 IMMUNOFIX E-PHORESIS SERUM: CPT

## 2017-03-12 PROCEDURE — 87116 MYCOBACTERIA CULTURE: CPT

## 2017-03-12 PROCEDURE — 83036 HEMOGLOBIN GLYCOSYLATED A1C: CPT

## 2017-03-12 PROCEDURE — 82436 ASSAY OF URINE CHLORIDE: CPT

## 2017-03-12 PROCEDURE — 82330 ASSAY OF CALCIUM: CPT

## 2017-03-12 PROCEDURE — 87633 RESP VIRUS 12-25 TARGETS: CPT

## 2017-03-12 PROCEDURE — 99291 CRITICAL CARE FIRST HOUR: CPT | Mod: 25

## 2017-03-12 PROCEDURE — 71045 X-RAY EXAM CHEST 1 VIEW: CPT

## 2017-03-12 PROCEDURE — 83935 ASSAY OF URINE OSMOLALITY: CPT

## 2017-03-12 PROCEDURE — 86162 COMPLEMENT TOTAL (CH50): CPT

## 2017-03-12 PROCEDURE — 93306 TTE W/DOPPLER COMPLETE: CPT

## 2017-03-12 PROCEDURE — 85220 BLOOC CLOT FACTOR V TEST: CPT

## 2017-03-12 PROCEDURE — 87086 URINE CULTURE/COLONY COUNT: CPT

## 2017-03-12 PROCEDURE — C1894: CPT

## 2017-03-12 PROCEDURE — 36415 COLL VENOUS BLD VENIPUNCTURE: CPT

## 2017-03-12 PROCEDURE — 99239 HOSP IP/OBS DSCHRG MGMT >30: CPT

## 2017-03-12 PROCEDURE — 87205 SMEAR GRAM STAIN: CPT

## 2017-03-12 PROCEDURE — A9585: CPT

## 2017-03-12 PROCEDURE — 84443 ASSAY THYROID STIM HORMONE: CPT

## 2017-03-12 PROCEDURE — 85598 HEXAGNAL PHOSPH PLTLT NEUTRL: CPT

## 2017-03-12 PROCEDURE — 87476 LYME DIS DNA AMP PROBE: CPT

## 2017-03-12 PROCEDURE — 81001 URINALYSIS AUTO W/SCOPE: CPT

## 2017-03-12 PROCEDURE — 86160 COMPLEMENT ANTIGEN: CPT

## 2017-03-12 PROCEDURE — 85730 THROMBOPLASTIN TIME PARTIAL: CPT

## 2017-03-12 PROCEDURE — 86900 BLOOD TYPING SEROLOGIC ABO: CPT

## 2017-03-12 PROCEDURE — 85027 COMPLETE CBC AUTOMATED: CPT

## 2017-03-12 PROCEDURE — 86225 DNA ANTIBODY NATIVE: CPT

## 2017-03-12 PROCEDURE — 80053 COMPREHEN METABOLIC PANEL: CPT

## 2017-03-12 PROCEDURE — 82553 CREATINE MB FRACTION: CPT

## 2017-03-12 PROCEDURE — 83020 HEMOGLOBIN ELECTROPHORESIS: CPT

## 2017-03-12 PROCEDURE — 81003 URINALYSIS AUTO W/O SCOPE: CPT

## 2017-03-12 PROCEDURE — 70544 MR ANGIOGRAPHY HEAD W/O DYE: CPT

## 2017-03-12 PROCEDURE — C8925: CPT

## 2017-03-12 PROCEDURE — 86431 RHEUMATOID FACTOR QUANT: CPT

## 2017-03-12 PROCEDURE — 86146 BETA-2 GLYCOPROTEIN ANTIBODY: CPT

## 2017-03-12 PROCEDURE — 70450 CT HEAD/BRAIN W/O DYE: CPT

## 2017-03-12 PROCEDURE — 85610 PROTHROMBIN TIME: CPT

## 2017-03-12 PROCEDURE — 87389 HIV-1 AG W/HIV-1&-2 AB AG IA: CPT

## 2017-03-12 PROCEDURE — 84432 ASSAY OF THYROGLOBULIN: CPT

## 2017-03-12 PROCEDURE — 86376 MICROSOMAL ANTIBODY EACH: CPT

## 2017-03-12 PROCEDURE — 84100 ASSAY OF PHOSPHORUS: CPT

## 2017-03-12 PROCEDURE — 87486 CHLMYD PNEUM DNA AMP PROBE: CPT

## 2017-03-12 PROCEDURE — 86235 NUCLEAR ANTIGEN ANTIBODY: CPT

## 2017-03-12 PROCEDURE — 96374 THER/PROPH/DIAG INJ IV PUSH: CPT | Mod: XU

## 2017-03-12 PROCEDURE — 85240 CLOT FACTOR VIII AHG 1 STAGE: CPT

## 2017-03-12 PROCEDURE — 84155 ASSAY OF PROTEIN SERUM: CPT

## 2017-03-12 PROCEDURE — 97116 GAIT TRAINING THERAPY: CPT

## 2017-03-12 PROCEDURE — 86901 BLOOD TYPING SEROLOGIC RH(D): CPT

## 2017-03-12 PROCEDURE — 83735 ASSAY OF MAGNESIUM: CPT

## 2017-03-12 PROCEDURE — 86147 CARDIOLIPIN ANTIBODY EA IG: CPT

## 2017-03-12 PROCEDURE — C1889: CPT

## 2017-03-12 PROCEDURE — 87798 DETECT AGENT NOS DNA AMP: CPT

## 2017-03-12 PROCEDURE — 93005 ELECTROCARDIOGRAM TRACING: CPT | Mod: 76

## 2017-03-12 PROCEDURE — 86850 RBC ANTIBODY SCREEN: CPT

## 2017-03-12 PROCEDURE — C1769: CPT

## 2017-03-12 PROCEDURE — 83615 LACTATE (LD) (LDH) ENZYME: CPT

## 2017-03-12 PROCEDURE — 86592 SYPHILIS TEST NON-TREP QUAL: CPT

## 2017-03-12 PROCEDURE — 95951: CPT

## 2017-03-12 PROCEDURE — 83695 ASSAY OF LIPOPROTEIN(A): CPT

## 2017-03-12 PROCEDURE — 85303 CLOT INHIBIT PROT C ACTIVITY: CPT

## 2017-03-12 PROCEDURE — 86140 C-REACTIVE PROTEIN: CPT

## 2017-03-12 PROCEDURE — 84132 ASSAY OF SERUM POTASSIUM: CPT

## 2017-03-12 PROCEDURE — 84295 ASSAY OF SERUM SODIUM: CPT

## 2017-03-12 PROCEDURE — 84300 ASSAY OF URINE SODIUM: CPT

## 2017-03-12 PROCEDURE — 82803 BLOOD GASES ANY COMBINATION: CPT

## 2017-03-12 PROCEDURE — 87040 BLOOD CULTURE FOR BACTERIA: CPT

## 2017-03-12 PROCEDURE — 87529 HSV DNA AMP PROBE: CPT

## 2017-03-12 PROCEDURE — 84165 PROTEIN E-PHORESIS SERUM: CPT

## 2017-03-12 RX ORDER — LEVETIRACETAM 250 MG/1
1 TABLET, FILM COATED ORAL
Qty: 60 | Refills: 0 | OUTPATIENT
Start: 2017-03-12 | End: 2017-04-11

## 2017-03-12 RX ORDER — CARVEDILOL PHOSPHATE 80 MG/1
1 CAPSULE, EXTENDED RELEASE ORAL
Qty: 60 | Refills: 0 | OUTPATIENT
Start: 2017-03-12 | End: 2017-04-11

## 2017-03-12 RX ORDER — INSULIN GLARGINE 100 [IU]/ML
39 INJECTION, SOLUTION SUBCUTANEOUS
Qty: 1170 | Refills: 0 | OUTPATIENT
Start: 2017-03-12 | End: 2017-03-13

## 2017-03-12 RX ORDER — ASPIRIN/CALCIUM CARB/MAGNESIUM 324 MG
1 TABLET ORAL
Qty: 30 | Refills: 0 | OUTPATIENT
Start: 2017-03-12 | End: 2017-04-11

## 2017-03-12 RX ORDER — METFORMIN HYDROCHLORIDE 850 MG/1
1 TABLET ORAL
Qty: 60 | Refills: 0 | OUTPATIENT
Start: 2017-03-12 | End: 2017-04-11

## 2017-03-12 RX ORDER — POTASSIUM CHLORIDE 20 MEQ
40 PACKET (EA) ORAL ONCE
Qty: 0 | Refills: 0 | Status: COMPLETED | OUTPATIENT
Start: 2017-03-12 | End: 2017-03-12

## 2017-03-12 RX ORDER — METOPROLOL TARTRATE 50 MG
1 TABLET ORAL
Qty: 30 | Refills: 0 | OUTPATIENT
Start: 2017-03-12 | End: 2017-04-11

## 2017-03-12 RX ORDER — ATORVASTATIN CALCIUM 80 MG/1
1 TABLET, FILM COATED ORAL
Qty: 30 | Refills: 0 | OUTPATIENT
Start: 2017-03-12 | End: 2017-04-11

## 2017-03-12 RX ORDER — AMLODIPINE BESYLATE 2.5 MG/1
1 TABLET ORAL
Qty: 30 | Refills: 0 | OUTPATIENT
Start: 2017-03-12 | End: 2017-04-11

## 2017-03-12 RX ORDER — INSULIN LISPRO 100/ML
3 VIAL (ML) SUBCUTANEOUS
Qty: 90 | Refills: 0 | OUTPATIENT
Start: 2017-03-12 | End: 2017-04-11

## 2017-03-12 RX ADMIN — Medication 40 MILLIEQUIVALENT(S): at 07:57

## 2017-03-12 RX ADMIN — CARVEDILOL PHOSPHATE 12.5 MILLIGRAM(S): 80 CAPSULE, EXTENDED RELEASE ORAL at 06:10

## 2017-03-12 RX ADMIN — Medication 81 MILLIGRAM(S): at 11:07

## 2017-03-12 RX ADMIN — Medication 3 UNIT(S): at 12:52

## 2017-03-12 RX ADMIN — LEVETIRACETAM 500 MILLIGRAM(S): 250 TABLET, FILM COATED ORAL at 06:11

## 2017-03-12 RX ADMIN — AMLODIPINE BESYLATE 10 MILLIGRAM(S): 2.5 TABLET ORAL at 06:11

## 2017-03-12 RX ADMIN — Medication 100 MICROGRAM(S): at 06:10

## 2017-03-12 RX ADMIN — Medication 2: at 11:08

## 2017-03-12 RX ADMIN — HEPARIN SODIUM 5000 UNIT(S): 5000 INJECTION INTRAVENOUS; SUBCUTANEOUS at 06:11

## 2017-03-13 LAB
PROT C ACT/NOR PPP: 101 % — SIGNIFICANT CHANGE UP (ref 74–150)
PROT S FREE AG PPP IA-ACNC: 85 % — SIGNIFICANT CHANGE UP (ref 67–141)

## 2017-03-14 DIAGNOSIS — E78.5 HYPERLIPIDEMIA, UNSPECIFIED: ICD-10-CM

## 2017-03-14 DIAGNOSIS — E03.9 HYPOTHYROIDISM, UNSPECIFIED: ICD-10-CM

## 2017-03-14 DIAGNOSIS — I63.9 CEREBRAL INFARCTION, UNSPECIFIED: ICD-10-CM

## 2017-03-14 DIAGNOSIS — A41.9 SEPSIS, UNSPECIFIED ORGANISM: ICD-10-CM

## 2017-03-14 DIAGNOSIS — N17.9 ACUTE KIDNEY FAILURE, UNSPECIFIED: ICD-10-CM

## 2017-03-14 DIAGNOSIS — I16.1 HYPERTENSIVE EMERGENCY: ICD-10-CM

## 2017-03-14 DIAGNOSIS — Z79.4 LONG TERM (CURRENT) USE OF INSULIN: ICD-10-CM

## 2017-03-14 DIAGNOSIS — L97.901 NON-PRESSURE CHRONIC ULCER OF UNSPECIFIED PART OF UNSPECIFIED LOWER LEG LIMITED TO BREAKDOWN OF SKIN: ICD-10-CM

## 2017-03-14 DIAGNOSIS — E11.65 TYPE 2 DIABETES MELLITUS WITH HYPERGLYCEMIA: ICD-10-CM

## 2017-03-14 DIAGNOSIS — E13.10 OTHER SPECIFIED DIABETES MELLITUS WITH KETOACIDOSIS WITHOUT COMA: ICD-10-CM

## 2017-03-14 DIAGNOSIS — G92 TOXIC ENCEPHALOPATHY: ICD-10-CM

## 2017-03-14 DIAGNOSIS — Z28.21 IMMUNIZATION NOT CARRIED OUT BECAUSE OF PATIENT REFUSAL: ICD-10-CM

## 2017-03-14 DIAGNOSIS — R47.81 SLURRED SPEECH: ICD-10-CM

## 2017-03-14 DIAGNOSIS — R29.810 FACIAL WEAKNESS: ICD-10-CM

## 2017-03-14 DIAGNOSIS — R26.81 UNSTEADINESS ON FEET: ICD-10-CM

## 2017-03-14 DIAGNOSIS — Z78.1 PHYSICAL RESTRAINT STATUS: ICD-10-CM

## 2017-03-14 DIAGNOSIS — G04.90 ENCEPHALITIS AND ENCEPHALOMYELITIS, UNSPECIFIED: ICD-10-CM

## 2017-03-14 DIAGNOSIS — I76 SEPTIC ARTERIAL EMBOLISM: ICD-10-CM

## 2017-03-14 DIAGNOSIS — R65.20 SEVERE SEPSIS WITHOUT SEPTIC SHOCK: ICD-10-CM

## 2017-03-14 DIAGNOSIS — G81.94 HEMIPLEGIA, UNSPECIFIED AFFECTING LEFT NONDOMINANT SIDE: ICD-10-CM

## 2017-03-14 DIAGNOSIS — R32 UNSPECIFIED URINARY INCONTINENCE: ICD-10-CM

## 2017-03-14 DIAGNOSIS — R41.82 ALTERED MENTAL STATUS, UNSPECIFIED: ICD-10-CM

## 2017-03-14 LAB
CARDIOLIPIN AB SER-ACNC: POSITIVE
HEMOGLOBIN INTERPRETATION: SIGNIFICANT CHANGE UP
HGB A MFR BLD: 97.7 % — SIGNIFICANT CHANGE UP (ref 95.8–98)
HGB A2 MFR BLD: 2.3 % — SIGNIFICANT CHANGE UP (ref 2–3.2)

## 2017-03-16 LAB
DNA PLOIDY SPEC FC-IMP: SIGNIFICANT CHANGE UP
MTHFR GENE INTERPRETATION: SIGNIFICANT CHANGE UP
PTR INTERPRETATION: SIGNIFICANT CHANGE UP

## 2017-03-17 ENCOUNTER — APPOINTMENT (OUTPATIENT)
Dept: INTERNAL MEDICINE | Facility: CLINIC | Age: 52
End: 2017-03-17

## 2017-03-17 VITALS
OXYGEN SATURATION: 98 % | HEART RATE: 74 BPM | DIASTOLIC BLOOD PRESSURE: 98 MMHG | BODY MASS INDEX: 29.97 KG/M2 | HEIGHT: 69.5 IN | WEIGHT: 207 LBS | SYSTOLIC BLOOD PRESSURE: 145 MMHG | TEMPERATURE: 97.4 F

## 2017-03-17 DIAGNOSIS — H53.8 OTHER VISUAL DISTURBANCES: ICD-10-CM

## 2017-03-17 DIAGNOSIS — R19.7 DIARRHEA, UNSPECIFIED: ICD-10-CM

## 2017-03-17 LAB — JAK2 P.V617F BLD/T QL: SIGNIFICANT CHANGE UP

## 2017-03-18 LAB
CULTURE RESULTS: NO GROWTH — SIGNIFICANT CHANGE UP
SPECIMEN SOURCE: SIGNIFICANT CHANGE UP

## 2017-03-20 LAB
ANION GAP SERPL CALC-SCNC: 17 MMOL/L
BUN SERPL-MCNC: 19 MG/DL
CALCIUM SERPL-MCNC: 9.1 MG/DL
CHLORIDE SERPL-SCNC: 102 MMOL/L
CO2 SERPL-SCNC: 23 MMOL/L
CREAT SERPL-MCNC: 1.28 MG/DL
GLUCOSE SERPL-MCNC: 239 MG/DL
POTASSIUM SERPL-SCNC: 4.6 MMOL/L
SODIUM SERPL-SCNC: 142 MMOL/L
WNV RNA SPEC QL NAA+PROBE: NEGATIVE — SIGNIFICANT CHANGE UP
WNV RNA SPEC QL NAA+PROBE: SIGNIFICANT CHANGE UP

## 2017-03-23 ENCOUNTER — APPOINTMENT (OUTPATIENT)
Dept: INTERNAL MEDICINE | Facility: CLINIC | Age: 52
End: 2017-03-23

## 2017-03-27 ENCOUNTER — APPOINTMENT (OUTPATIENT)
Dept: INTERNAL MEDICINE | Facility: CLINIC | Age: 52
End: 2017-03-27

## 2017-03-27 VITALS
WEIGHT: 210 LBS | TEMPERATURE: 97.4 F | BODY MASS INDEX: 30.4 KG/M2 | DIASTOLIC BLOOD PRESSURE: 90 MMHG | HEIGHT: 69.5 IN | RESPIRATION RATE: 97 BRPM | HEART RATE: 70 BPM | SYSTOLIC BLOOD PRESSURE: 128 MMHG

## 2017-03-27 DIAGNOSIS — S81.802D UNSPECIFIED OPEN WOUND, LEFT LOWER LEG, SUBSEQUENT ENCOUNTER: ICD-10-CM

## 2017-03-28 LAB
FOLATE SERPL-MCNC: 14.3 NG/ML
HCV AB SER QL: NONREACTIVE
HCV S/CO RATIO: 0.05 S/CO
HIV1+2 AB SPEC QL IA.RAPID: NONREACTIVE
VIT B12 SERPL-MCNC: 256 PG/ML

## 2017-04-03 ENCOUNTER — APPOINTMENT (OUTPATIENT)
Dept: NEUROLOGY | Facility: CLINIC | Age: 52
End: 2017-04-03

## 2017-04-03 VITALS
WEIGHT: 199 LBS | BODY MASS INDEX: 28.81 KG/M2 | HEIGHT: 69.5 IN | HEART RATE: 64 BPM | SYSTOLIC BLOOD PRESSURE: 143 MMHG | DIASTOLIC BLOOD PRESSURE: 86 MMHG | OXYGEN SATURATION: 100 %

## 2017-04-03 DIAGNOSIS — Z56.0 UNEMPLOYMENT, UNSPECIFIED: ICD-10-CM

## 2017-04-03 DIAGNOSIS — F09 UNSPECIFIED MENTAL DISORDER DUE TO KNOWN PHYSIOLOGICAL CONDITION: ICD-10-CM

## 2017-04-03 RX ORDER — CEPHALEXIN 500 MG/1
500 CAPSULE ORAL
Qty: 40 | Refills: 0 | Status: DISCONTINUED | COMMUNITY
Start: 2016-10-31

## 2017-04-03 RX ORDER — CHLORPROMAZINE HYDROCHLORIDE 25 MG/1
25 TABLET, SUGAR COATED ORAL
Qty: 9 | Refills: 0 | Status: DISCONTINUED | COMMUNITY
Start: 2016-10-21

## 2017-04-03 SDOH — ECONOMIC STABILITY - INCOME SECURITY: UNEMPLOYMENT, UNSPECIFIED: Z56.0

## 2017-04-07 ENCOUNTER — LABORATORY RESULT (OUTPATIENT)
Age: 52
End: 2017-04-07

## 2017-04-07 ENCOUNTER — APPOINTMENT (OUTPATIENT)
Dept: INTERNAL MEDICINE | Facility: CLINIC | Age: 52
End: 2017-04-07

## 2017-04-07 VITALS
TEMPERATURE: 98.4 F | OXYGEN SATURATION: 99 % | SYSTOLIC BLOOD PRESSURE: 130 MMHG | HEART RATE: 85 BPM | BODY MASS INDEX: 28.81 KG/M2 | WEIGHT: 199 LBS | DIASTOLIC BLOOD PRESSURE: 80 MMHG | HEIGHT: 69.5 IN

## 2017-04-08 ENCOUNTER — FORM ENCOUNTER (OUTPATIENT)
Age: 52
End: 2017-04-08

## 2017-04-09 ENCOUNTER — OUTPATIENT (OUTPATIENT)
Dept: OUTPATIENT SERVICES | Facility: HOSPITAL | Age: 52
LOS: 1 days | End: 2017-04-09
Payer: COMMERCIAL

## 2017-04-09 DIAGNOSIS — Z98.52 VASECTOMY STATUS: Chronic | ICD-10-CM

## 2017-04-09 DIAGNOSIS — Z90.89 ACQUIRED ABSENCE OF OTHER ORGANS: Chronic | ICD-10-CM

## 2017-04-09 PROCEDURE — 71020: CPT | Mod: 26

## 2017-04-09 PROCEDURE — 71046 X-RAY EXAM CHEST 2 VIEWS: CPT

## 2017-04-10 ENCOUNTER — OTHER (OUTPATIENT)
Age: 52
End: 2017-04-10

## 2017-04-10 LAB
ALBUMIN SERPL ELPH-MCNC: 4 G/DL
ALP BLD-CCNC: 48 U/L
ALT SERPL-CCNC: 32 U/L
APPEARANCE: CLEAR
APTT BLD: 35.8 SEC
AST SERPL-CCNC: 16 U/L
BASOPHILS # BLD AUTO: 0.04 K/UL
BASOPHILS NFR BLD AUTO: 0.3 %
BILIRUB DIRECT SERPL-MCNC: 0.1 MG/DL
BILIRUB INDIRECT SERPL-MCNC: 0.2 MG/DL
BILIRUB SERPL-MCNC: 0.3 MG/DL
BILIRUBIN URINE: NEGATIVE
BLOOD URINE: NEGATIVE
COLOR: YELLOW
EOSINOPHIL # BLD AUTO: 0.38 K/UL
EOSINOPHIL NFR BLD AUTO: 2.8 %
GLUCOSE QUALITATIVE U: 1000 MG/DL
HBA1C MFR BLD HPLC: 10.9 %
HCT VFR BLD CALC: 41.1 %
HGB BLD-MCNC: 14.4 G/DL
IMM GRANULOCYTES NFR BLD AUTO: 0.7 %
INR PPP: 0.83 RATIO
KETONES URINE: NEGATIVE
LEUKOCYTE ESTERASE URINE: NEGATIVE
LYMPHOCYTES # BLD AUTO: 2.62 K/UL
LYMPHOCYTES NFR BLD AUTO: 19.4 %
MAN DIFF?: NORMAL
MCHC RBC-ENTMCNC: 29.6 PG
MCHC RBC-ENTMCNC: 35 GM/DL
MCV RBC AUTO: 84.4 FL
MONOCYTES # BLD AUTO: 0.72 K/UL
MONOCYTES NFR BLD AUTO: 5.3 %
NEUTROPHILS # BLD AUTO: 9.65 K/UL
NEUTROPHILS NFR BLD AUTO: 71.5 %
NITRITE URINE: NEGATIVE
PH URINE: 5.5
PLATELET # BLD AUTO: 379 K/UL
PROT SERPL-MCNC: 6.6 G/DL
PROTEIN URINE: 100 MG/DL
PT BLD: 9.4 SEC
RBC # BLD: 4.87 M/UL
RBC # FLD: 13.4 %
SPECIFIC GRAVITY URINE: 1.02
UROBILINOGEN URINE: NORMAL MG/DL
WBC # FLD AUTO: 13.51 K/UL

## 2017-04-19 LAB
CULTURE RESULTS: SIGNIFICANT CHANGE UP
SPECIMEN SOURCE: SIGNIFICANT CHANGE UP

## 2017-04-21 ENCOUNTER — OTHER (OUTPATIENT)
Age: 52
End: 2017-04-21

## 2017-04-21 ENCOUNTER — APPOINTMENT (OUTPATIENT)
Dept: INTERNAL MEDICINE | Facility: CLINIC | Age: 52
End: 2017-04-21

## 2017-04-21 LAB
ANION GAP SERPL CALC-SCNC: 20 MMOL/L
BUN SERPL-MCNC: 29 MG/DL
CALCIUM SERPL-MCNC: 9.3 MG/DL
CHLORIDE SERPL-SCNC: 93 MMOL/L
CO2 SERPL-SCNC: 23 MMOL/L
CREAT SERPL-MCNC: 1.5 MG/DL
GLUCOSE SERPL-MCNC: 369 MG/DL
POTASSIUM SERPL-SCNC: 4.7 MMOL/L
SODIUM SERPL-SCNC: 136 MMOL/L

## 2017-04-24 ENCOUNTER — LABORATORY RESULT (OUTPATIENT)
Age: 52
End: 2017-04-24

## 2017-04-24 ENCOUNTER — APPOINTMENT (OUTPATIENT)
Dept: HEMATOLOGY ONCOLOGY | Facility: CLINIC | Age: 52
End: 2017-04-24

## 2017-04-24 VITALS
TEMPERATURE: 98 F | SYSTOLIC BLOOD PRESSURE: 124 MMHG | WEIGHT: 205.38 LBS | DIASTOLIC BLOOD PRESSURE: 82 MMHG | HEART RATE: 66 BPM | BODY MASS INDEX: 29.74 KG/M2 | OXYGEN SATURATION: 98 % | RESPIRATION RATE: 18 BRPM | HEIGHT: 69.5 IN

## 2017-04-24 DIAGNOSIS — I10 ESSENTIAL (PRIMARY) HYPERTENSION: ICD-10-CM

## 2017-04-24 DIAGNOSIS — D68.62 LUPUS ANTICOAGULANT SYNDROME: ICD-10-CM

## 2017-04-24 DIAGNOSIS — I63.9 CEREBRAL INFARCTION, UNSPECIFIED: ICD-10-CM

## 2017-04-25 ENCOUNTER — RESULT REVIEW (OUTPATIENT)
Age: 52
End: 2017-04-25

## 2017-04-25 ENCOUNTER — CLINICAL ADVICE (OUTPATIENT)
Age: 52
End: 2017-04-25

## 2017-04-25 LAB
25(OH)D3 SERPL-MCNC: 11.6 NG/ML
CARDIOLIPIN AB SER IA-ACNC: NEGATIVE
CONFIRM: 25.6 SEC
DRVVT IMM 1:2 NP PPP: NORMAL
DRVVT SCREEN TO CONFIRM RATIO: 1.05 RATIO
SCREEN DRVVT: 30 SEC
SILICA CLOTTING TIME INTERPRETATION: NORMAL
SILICA CLOTTING TIME S/C: 0.9 RATIO

## 2017-04-26 LAB
B2 GLYCOPROT1 IGA SERPL IA-ACNC: 5.8 SAU
B2 GLYCOPROT1 IGG SER-ACNC: <5 SGU
B2 GLYCOPROT1 IGM SER-ACNC: <5 SMU

## 2017-05-24 ENCOUNTER — APPOINTMENT (OUTPATIENT)
Dept: HEMATOLOGY ONCOLOGY | Facility: CLINIC | Age: 52
End: 2017-05-24

## 2017-05-24 VITALS
WEIGHT: 212 LBS | HEIGHT: 69.5 IN | SYSTOLIC BLOOD PRESSURE: 134 MMHG | OXYGEN SATURATION: 98 % | RESPIRATION RATE: 20 BRPM | BODY MASS INDEX: 30.69 KG/M2 | TEMPERATURE: 98 F | DIASTOLIC BLOOD PRESSURE: 88 MMHG | HEART RATE: 77 BPM

## 2017-05-24 DIAGNOSIS — E72.12 METHYLENETETRAHYDROFOLATE REDUCTASE DEFICIENCY: ICD-10-CM

## 2017-05-24 DIAGNOSIS — E72.11 METHYLENETETRAHYDROFOLATE REDUCTASE DEFICIENCY: ICD-10-CM

## 2017-05-24 RX ORDER — CYANOCOBALAMIN 1000 UG/ML
1000 INJECTION INTRAMUSCULAR; SUBCUTANEOUS
Qty: 0 | Refills: 0 | Status: COMPLETED | OUTPATIENT
Start: 2017-05-24

## 2017-05-24 RX ORDER — PEN NEEDLE, DIABETIC 29 G X1/2"
32G X 4 MM NEEDLE, DISPOSABLE MISCELLANEOUS
Qty: 100 | Refills: 0 | Status: ACTIVE | COMMUNITY
Start: 2017-04-28

## 2017-05-24 RX ADMIN — CYANOCOBALAMIN 0 MCG/ML: 1000 INJECTION INTRAMUSCULAR; SUBCUTANEOUS at 00:00

## 2017-05-25 LAB
25(OH)D3 SERPL-MCNC: 18.4 NG/ML
HBA1C MFR BLD HPLC: 10.9 %
HCYS SERPL-MCNC: 14.1 UMOL/L
VIT B12 SERPL-MCNC: 388 PG/ML

## 2017-05-26 ENCOUNTER — APPOINTMENT (OUTPATIENT)
Dept: INTERNAL MEDICINE | Facility: CLINIC | Age: 52
End: 2017-05-26

## 2017-05-26 VITALS
DIASTOLIC BLOOD PRESSURE: 80 MMHG | SYSTOLIC BLOOD PRESSURE: 130 MMHG | HEART RATE: 72 BPM | BODY MASS INDEX: 32.14 KG/M2 | WEIGHT: 217 LBS | OXYGEN SATURATION: 98 % | TEMPERATURE: 97 F | HEIGHT: 69 IN

## 2017-05-26 DIAGNOSIS — Z01.818 ENCOUNTER FOR OTHER PREPROCEDURAL EXAMINATION: ICD-10-CM

## 2017-05-26 DIAGNOSIS — E08.3213 DIABETES MELLITUS DUE TO UNDERLYING CONDITION WITH MILD NONPROLIFERATIVE DIABETIC RETINOPATHY WITH MACULAR EDEMA, BILATERAL: ICD-10-CM

## 2017-05-26 RX ORDER — ASPIRIN 81 MG/1
81 TABLET, CHEWABLE ORAL
Qty: 30 | Refills: 0 | Status: DISCONTINUED | COMMUNITY
Start: 2017-03-13 | End: 2017-05-26

## 2017-05-26 RX ORDER — ERGOCALCIFEROL 1.25 MG/1
1.25 MG CAPSULE, LIQUID FILLED ORAL
Qty: 4 | Refills: 5 | Status: DISCONTINUED | COMMUNITY
Start: 2017-04-25 | End: 2017-05-26

## 2017-05-30 LAB
ALBUMIN SERPL ELPH-MCNC: 4.1 G/DL
ALP BLD-CCNC: 45 U/L
ALT SERPL-CCNC: 17 U/L
ANION GAP SERPL CALC-SCNC: 15 MMOL/L
APTT BLD: 36.7 SEC
AST SERPL-CCNC: 12 U/L
BASOPHILS # BLD AUTO: 0.03 K/UL
BASOPHILS NFR BLD AUTO: 0.3 %
BILIRUB SERPL-MCNC: 0.3 MG/DL
BUN SERPL-MCNC: 39 MG/DL
CALCIUM SERPL-MCNC: 9.9 MG/DL
CHLORIDE SERPL-SCNC: 100 MMOL/L
CO2 SERPL-SCNC: 25 MMOL/L
CREAT SERPL-MCNC: 1.53 MG/DL
EOSINOPHIL # BLD AUTO: 0.27 K/UL
EOSINOPHIL NFR BLD AUTO: 2.7 %
GLUCOSE SERPL-MCNC: 186 MG/DL
HCT VFR BLD CALC: 43 %
HGB BLD-MCNC: 14 G/DL
IMM GRANULOCYTES NFR BLD AUTO: 0.4 %
INR PPP: 0.88 RATIO
LYMPHOCYTES # BLD AUTO: 2.89 K/UL
LYMPHOCYTES NFR BLD AUTO: 29.2 %
MAN DIFF?: NORMAL
MCHC RBC-ENTMCNC: 29 PG
MCHC RBC-ENTMCNC: 32.6 GM/DL
MCV RBC AUTO: 89.2 FL
MONOCYTES # BLD AUTO: 0.55 K/UL
MONOCYTES NFR BLD AUTO: 5.6 %
NEUTROPHILS # BLD AUTO: 6.12 K/UL
NEUTROPHILS NFR BLD AUTO: 61.8 %
PLATELET # BLD AUTO: 300 K/UL
POTASSIUM SERPL-SCNC: 4.4 MMOL/L
PROT SERPL-MCNC: 6.9 G/DL
PT BLD: 9.9 SEC
RBC # BLD: 4.82 M/UL
RBC # FLD: 13.7 %
SODIUM SERPL-SCNC: 140 MMOL/L
WBC # FLD AUTO: 9.9 K/UL

## 2017-05-31 NOTE — PHYSICAL THERAPY INITIAL EVALUATION ADULT - ASR WT BEARING STATUS EVAL
Message from [employer] [facility]:  Original authorizing provider: MD Lucero Hoover would like a refill of the following medications:  ondansetron (ZOFRAN) 4 MG tablet [Yakelin Sen MD]    Preferred pharmacy: [facility] [address]    Comment:     no weight-bearing restrictions

## 2017-06-01 ENCOUNTER — RX RENEWAL (OUTPATIENT)
Age: 52
End: 2017-06-01

## 2017-06-02 ENCOUNTER — APPOINTMENT (OUTPATIENT)
Dept: ENDOCRINOLOGY | Facility: CLINIC | Age: 52
End: 2017-06-02

## 2017-06-02 ENCOUNTER — OTHER (OUTPATIENT)
Age: 52
End: 2017-06-02

## 2017-06-02 VITALS
HEART RATE: 65 BPM | SYSTOLIC BLOOD PRESSURE: 138 MMHG | DIASTOLIC BLOOD PRESSURE: 82 MMHG | WEIGHT: 211 LBS | BODY MASS INDEX: 31.16 KG/M2

## 2017-06-02 DIAGNOSIS — E53.8 DEFICIENCY OF OTHER SPECIFIED B GROUP VITAMINS: ICD-10-CM

## 2017-06-02 DIAGNOSIS — E66.3 OVERWEIGHT: ICD-10-CM

## 2017-06-02 DIAGNOSIS — E03.9 HYPOTHYROIDISM, UNSPECIFIED: ICD-10-CM

## 2017-06-02 DIAGNOSIS — E11.65 TYPE 2 DIABETES MELLITUS WITH HYPERGLYCEMIA: ICD-10-CM

## 2017-06-02 DIAGNOSIS — E78.5 HYPERLIPIDEMIA, UNSPECIFIED: ICD-10-CM

## 2017-06-02 DIAGNOSIS — E55.9 VITAMIN D DEFICIENCY, UNSPECIFIED: ICD-10-CM

## 2017-06-02 RX ORDER — CHOLECALCIFEROL (VITAMIN D3) 50 MCG
50 MCG TABLET ORAL
Qty: 90 | Refills: 1 | Status: ACTIVE | COMMUNITY
Start: 2017-05-26 | End: 1900-01-01

## 2017-06-05 ENCOUNTER — APPOINTMENT (OUTPATIENT)
Dept: GASTROENTEROLOGY | Facility: CLINIC | Age: 52
End: 2017-06-05

## 2017-06-05 VITALS
HEART RATE: 63 BPM | TEMPERATURE: 98.6 F | RESPIRATION RATE: 14 BRPM | DIASTOLIC BLOOD PRESSURE: 91 MMHG | OXYGEN SATURATION: 98 % | WEIGHT: 220 LBS | BODY MASS INDEX: 32.58 KG/M2 | SYSTOLIC BLOOD PRESSURE: 159 MMHG | HEIGHT: 69 IN

## 2017-06-05 DIAGNOSIS — Z82.3 FAMILY HISTORY OF STROKE: ICD-10-CM

## 2017-06-05 DIAGNOSIS — Z80.9 FAMILY HISTORY OF MALIGNANT NEOPLASM, UNSPECIFIED: ICD-10-CM

## 2017-06-05 DIAGNOSIS — Z12.11 ENCOUNTER FOR SCREENING FOR MALIGNANT NEOPLASM OF COLON: ICD-10-CM

## 2017-06-05 DIAGNOSIS — Z82.49 FAMILY HISTORY OF ISCHEMIC HEART DISEASE AND OTHER DISEASES OF THE CIRCULATORY SYSTEM: ICD-10-CM

## 2017-06-05 LAB
CHOLEST SERPL-MCNC: 165 MG/DL
CHOLEST/HDLC SERPL: 4.6 RATIO
HDLC SERPL-MCNC: 36 MG/DL
LDLC SERPL CALC-MCNC: 84 MG/DL
THYROGLOB AB SERPL-ACNC: <20 IU/ML
THYROPEROXIDASE AB SERPL IA-ACNC: <10 IU/ML
TRIGL SERPL-MCNC: 225 MG/DL
TSH SERPL-ACNC: 3.9 UIU/ML

## 2017-06-07 PROBLEM — Z80.9 FAMILY HISTORY OF MALIGNANT NEOPLASM: Status: ACTIVE | Noted: 2017-04-03

## 2017-06-07 PROBLEM — Z82.49 FAMILY HISTORY OF CARDIAC DISORDER: Status: ACTIVE | Noted: 2017-04-03

## 2017-06-07 PROBLEM — Z82.3 FAMILY HISTORY OF CEREBROVASCULAR ACCIDENT (CVA): Status: ACTIVE | Noted: 2017-04-03

## 2017-06-12 ENCOUNTER — RX RENEWAL (OUTPATIENT)
Age: 52
End: 2017-06-12

## 2017-06-12 ENCOUNTER — MEDICATION RENEWAL (OUTPATIENT)
Age: 52
End: 2017-06-12

## 2017-06-24 ENCOUNTER — OUTPATIENT (OUTPATIENT)
Dept: OUTPATIENT SERVICES | Facility: HOSPITAL | Age: 52
LOS: 1 days | End: 2017-06-24
Payer: COMMERCIAL

## 2017-06-24 DIAGNOSIS — Z98.52 VASECTOMY STATUS: Chronic | ICD-10-CM

## 2017-06-24 DIAGNOSIS — Z90.89 ACQUIRED ABSENCE OF OTHER ORGANS: Chronic | ICD-10-CM

## 2017-06-24 PROCEDURE — 76536 US EXAM OF HEAD AND NECK: CPT | Mod: 26

## 2017-06-24 PROCEDURE — 76536 US EXAM OF HEAD AND NECK: CPT

## 2017-06-30 ENCOUNTER — APPOINTMENT (OUTPATIENT)
Dept: NEUROLOGY | Facility: CLINIC | Age: 52
End: 2017-06-30

## 2017-07-12 ENCOUNTER — OTHER (OUTPATIENT)
Age: 52
End: 2017-07-12

## 2017-07-28 ENCOUNTER — CLINICAL ADVICE (OUTPATIENT)
Age: 52
End: 2017-07-28

## 2017-08-01 NOTE — ED PROVIDER NOTE - CADM POA PRESS ULCER
DIRECT LARYNGOSCOPY, ESOPHAGOSCOPY, BRONCHOSCOPY, EXCISION SUBLINGUAL CYST/GLAND  Procedure Note    Amira Shannon  8/1/2017    Pre-op Diagnosis:   Tongue lesion [K14.8]    Post-op Diagnosis:     Post-Op Diagnosis Codes:     * Tongue lesion [K14.8]    Procedure/CPT® Codes:      Procedure(s):  DIRECT LARYNGOSCOPY AND  EXCISION OF LEFT  TONGUE LESION WITH CLOSURE    Surgeon(s):  Live Bolton MD    Anesthesia: General    Staff:   Circulator: Tanvi Chaudhari RN; Mazin Calderon RN  Scrub Person: Paola Jerome  Assistant: Sneha Montenegro    Estimated Blood Loss: *No blood loss documented*  Urine Voided: * No values recorded between 8/1/2017 12:00 PM and 8/1/2017 12:44 PM *    Specimens:                  ID Type Source Tests Collected by Time Destination   A : anterior stitch long, posterior stitch short, medial stitch middle Tissue Tongue TISSUE EXAM Live Bolton MD 8/1/2017 1226          Drains:    none       Findings:  Ulcer L lateral tongue    Complications None      Live Bolton MD     Date: 8/1/2017  Time: 12:44 PM      
No

## 2017-08-02 ENCOUNTER — APPOINTMENT (OUTPATIENT)
Dept: NEUROLOGY | Facility: CLINIC | Age: 52
End: 2017-08-02

## 2017-08-07 ENCOUNTER — RX RENEWAL (OUTPATIENT)
Age: 52
End: 2017-08-07

## 2017-08-09 NOTE — CONSULT NOTE ADULT - SUBJECTIVE AND OBJECTIVE BOX
Problem: Patient Care Overview (Adult)  Goal: Discharge Needs Assessment  Outcome: Ongoing (interventions implemented as appropriate)  Will follow up with md as scheduled.       NEUROLOGY FOLLOW-UP CONSULT NOTE    CHIEF COMPLAINT:  Altered mental status    INTERVAL HISTORY:  Patient seen and examined today at the bedside.  Additional history was obtained from the fiance who was also at the bedside.  Patient was in his usual state of health until Friday evening when he became to be more quite and slow with his thinking.  Friday night into Saturday, he was up all night vomiting and and with urinary incontinence.  He was brought to [facility] by EMS and then intubated and admitted to ICU.  He was extubated yesterday and is now doing well physically.  He continues to have no recollection of the events leading up to this hospitalization.  He states he is 100% back to normal.    REVIEW OF SYSTEMS:  As per HPI, otherwise negative for Constitutional, Eyes, Ears/Nose/Mouth/Throat, Neck, Cardiovascular, Respiratory, Gastrointestinal, Genitourinary, Skin, Endocrine, Musculoskeletal, Psychiatric, and Hematologic/Lymphatic.    MEDICATIONS:  heparin  Injectable 5000Unit(s) SubCutaneous every 8 hours  insulin lispro (HumaLOG) corrective regimen sliding scale  SubCutaneous every 4 hours  amLODIPine   Tablet 10milliGRAM(s) Oral daily  levETIRAcetam  IVPB 500milliGRAM(s) IV Intermittent every 12 hours  valsartan 320milliGRAM(s) Oral daily  levothyroxine 100MICROGram(s) Oral daily  insulin glargine Injectable (LANTUS) 32Unit(s) SubCutaneous at bedtime  atorvastatin 40milliGRAM(s) Oral at bedtime  aspirin  chewable 81milliGRAM(s) Oral daily    VITAL SIGNS:  Vital Signs Last 24 Hrs  T(C): 36.6, Max: 37.2 (03-06 @ 21:47)  T(F): 97.8, Max: 99 (03-06 @ 21:47)  HR: 70 (68 - 96)  BP: 179/101 (165/91 - 195/90)  BP(mean): 129 (125 - 137)  RR: 17 (0 - 33)  SpO2: 98% (93% - 99%)    PHYSICAL EXAMINATION:  General: Well-developed, well nourished man, in no acute distress.  Eyes: Conjunctiva and sclera clear.  Cardiovascular: 2+ distal pulses.  Neurologic:  - Mental Status:  Alert, awake, oriented to person, place, and time; Speech is fluent with intact naming, repetition, and comprehension; Immediate recall is 3/3 words and delayed recall is 2/3 words at 5 minutes; Unable to spell WORLD backwards; Able to perform serial 7 subtraction, but slowly and with difficulty; Able to write a sentence, but with impersistence (keeps scratching out and starting all over again); Unable to copy a cube or interlocking pentagons; Good overall fund of knowledge.  - Cranial Nerves II-XII:    II:  Visual fields are full to confrontation; Pupils are equal, round, and reactive to light.  III, IV, VI:  Extraocular movements are intact without nystagmus.  V:  Facial sensation is intact in the V1-V3 distribution bilaterally.  VII:  Face is symmetric with normal eye closure and smile  VIII:  Hearing is intact to finger rub.  IX, X:  Uvula is midline and soft palate rises symmetrically  XI:  Head turning and shoulder shrug are intact.  XII:  Tongue protrudes in the midline.  - Motor:  Strength is 5/5 throughout.  There is no pronator drift.  Normal muscle bulk and tone throughout.  - Reflexes:  2+ and symmetric at the biceps, triceps, brachioradialis, knees, and ankles.  Plantar responses flexor.  - Sensory:  Intact to light touch, pin prick, vibration, and joint-position sense throughout.  - Coordination:  Finger-nose-finger and heel-knee-shin intact without dysmetria.  Rapid alternating hand movements intact.  - Gait:   Normal steps, base, arm swing, and turning.  Romberg testing is negative.    LABS:               12.3   8.7   )-----------( 196      ( 07 Mar 2017 06:03 )             36.0     142    |  108    |  10     ----------------------------<  153    3.1     |  28     |  1.04     Ca    8.1        07 Mar 2017 06:03  Phos  3.2       07 Mar 2017 06:03  Mg     2.3       07 Mar 2017 06:03    TPro  5.7    /  Alb  2.3    /  TBili  0.7    /  DBili  x      /  AST  14     /  ALT  15     /  AlkPhos  30     07 Mar 2017 06:03    RADIOLOGY AND ADDITIONAL STUDIES:  MRI Brain w/o contrast (3/4/2017):    1. Punctate areas of acute infarction within the right occipital cortex, left thalamus medially, and just superior to the right splenium within the periventricular white matter along the medial edge of the posterior   body of the right lateral ventricle. Given chronic periventricular and subcortical microangiopathic disease with evidence of remote lacunar infarcts in the thalami bilaterally and anterior limb of left internal capsule, these areas of infarction may be on that basis. Other etiologies are not excluded.    2. Absence of CSF suppression in the frontoparietal convexity sulci bilaterally, raises the possibility of meningitis especially given the presence of mild diffuse ventriculomegaly, which is slightly out of proportion to the degree of parenchymal volume loss. Brain MR with contrast may be helpful for further evaluation, as failure of CSF suppression may occur due to technical factors.    IMPRESSION & PLAN:  Mr. Avitia is a 51-year-old man with a history of uncontrolled DM, HTN, hypothyroidism who was BIBEMS 3 days ago for slurred speech, weakness on left side (? R gaze deviation), and fevers, admitted to MICU after being intubated in ED for airway protection for altered mental status, now s/p empiric broad spectrum coverage for meningoencephalitis. CSF studies unrevealing, 2 nucleated cells with glucose elevation (also significantly elevated in serum, CSF/serum ratio >0.6 suggests less likely fungal etiology). Protein in CSF is elevated however, which can be seen with viral or immune-mediated encephalitis.  Initial focal neurologic deficits on presentation concerning for structural etiology such as stroke vs. seizure with post-ictal tiana's paralysis.  Neurologic exam today is notable for psychomotor slowing, blunted affect, impaired executive function and visuospatial skills.  MRI brain on 3/4/17 shows punctate acute infarct in L thalamus, R occipital cortex, and R splenium concerning for embolic shower.  Altogether, it is possible that he was bacteremic secondary to poorly healing left leg ulcer with septic emboli to brain and encephalopathy causing seizures.    - Recommend MRI of the brain with and without contrast   - AMARI to rule out thrombus or valvular vegetation  - Start Aspirin 81 mg PO daily for secondary stroke prevention  - Discontinue antibiotics as per ID  - Continue Keppra 500 mg PO BID for seizure prevention  - Will follow

## 2017-11-07 ENCOUNTER — RX RENEWAL (OUTPATIENT)
Age: 52
End: 2017-11-07

## 2017-11-07 RX ORDER — CARVEDILOL 12.5 MG/1
12.5 TABLET, FILM COATED ORAL TWICE DAILY
Qty: 60 | Refills: 1 | Status: ACTIVE | COMMUNITY
Start: 2017-03-17 | End: 1900-01-01

## 2017-11-07 RX ORDER — ASPIRIN 81 MG/1
81 TABLET ORAL DAILY
Qty: 30 | Refills: 1 | Status: ACTIVE | COMMUNITY
Start: 2017-03-17 | End: 1900-01-01

## 2017-11-07 RX ORDER — LEVETIRACETAM 500 MG/1
500 TABLET, FILM COATED ORAL
Qty: 1 | Refills: 1 | Status: ACTIVE | COMMUNITY
Start: 2017-03-17 | End: 1900-01-01

## 2017-11-07 RX ORDER — LISINOPRIL 5 MG/1
5 TABLET ORAL DAILY
Qty: 30 | Refills: 1 | Status: ACTIVE | COMMUNITY
Start: 2017-06-02 | End: 1900-01-01

## 2017-11-07 RX ORDER — METOPROLOL SUCCINATE 50 MG/1
50 TABLET, EXTENDED RELEASE ORAL DAILY
Qty: 30 | Refills: 1 | Status: ACTIVE | COMMUNITY
Start: 2017-03-13 | End: 1900-01-01

## 2017-11-08 RX ORDER — METFORMIN HYDROCHLORIDE 500 MG/1
500 TABLET, FILM COATED, EXTENDED RELEASE ORAL
Qty: 30 | Refills: 1 | Status: DISCONTINUED | COMMUNITY
Start: 2017-06-02 | End: 2017-11-08

## 2017-11-08 RX ORDER — INSULIN GLARGINE 100 [IU]/ML
100 INJECTION, SOLUTION SUBCUTANEOUS
Qty: 1 | Refills: 0 | Status: ACTIVE | COMMUNITY
Start: 2017-03-12 | End: 1900-01-01

## 2017-11-08 RX ORDER — METFORMIN ER 500 MG 500 MG/1
500 TABLET ORAL
Qty: 30 | Refills: 5 | Status: ACTIVE | COMMUNITY
Start: 2017-06-02 | End: 1900-01-01

## 2017-11-08 RX ORDER — INSULIN ASPART 100 [IU]/ML
100 INJECTION, SOLUTION INTRAVENOUS; SUBCUTANEOUS
Qty: 1 | Refills: 0 | Status: ACTIVE | COMMUNITY
Start: 2017-06-08 | End: 1900-01-01

## 2017-11-15 ENCOUNTER — EMERGENCY (EMERGENCY)
Facility: HOSPITAL | Age: 52
LOS: 1 days | Discharge: HOME | End: 2017-11-15
Admitting: EMERGENCY MEDICINE
Payer: COMMERCIAL

## 2017-11-15 VITALS
SYSTOLIC BLOOD PRESSURE: 209 MMHG | HEART RATE: 71 BPM | TEMPERATURE: 98 F | WEIGHT: 199.96 LBS | HEIGHT: 71 IN | OXYGEN SATURATION: 97 % | DIASTOLIC BLOOD PRESSURE: 99 MMHG | RESPIRATION RATE: 18 BRPM

## 2017-11-15 VITALS
SYSTOLIC BLOOD PRESSURE: 212 MMHG | DIASTOLIC BLOOD PRESSURE: 99 MMHG | OXYGEN SATURATION: 100 % | RESPIRATION RATE: 18 BRPM | HEART RATE: 74 BPM | TEMPERATURE: 98 F

## 2017-11-15 DIAGNOSIS — Z98.52 VASECTOMY STATUS: Chronic | ICD-10-CM

## 2017-11-15 DIAGNOSIS — Z76.0 ENCOUNTER FOR ISSUE OF REPEAT PRESCRIPTION: ICD-10-CM

## 2017-11-15 DIAGNOSIS — I10 ESSENTIAL (PRIMARY) HYPERTENSION: ICD-10-CM

## 2017-11-15 DIAGNOSIS — E03.9 HYPOTHYROIDISM, UNSPECIFIED: ICD-10-CM

## 2017-11-15 DIAGNOSIS — Z79.899 OTHER LONG TERM (CURRENT) DRUG THERAPY: ICD-10-CM

## 2017-11-15 DIAGNOSIS — E78.5 HYPERLIPIDEMIA, UNSPECIFIED: ICD-10-CM

## 2017-11-15 DIAGNOSIS — Z79.82 LONG TERM (CURRENT) USE OF ASPIRIN: ICD-10-CM

## 2017-11-15 DIAGNOSIS — E11.9 TYPE 2 DIABETES MELLITUS WITHOUT COMPLICATIONS: ICD-10-CM

## 2017-11-15 DIAGNOSIS — Z79.4 LONG TERM (CURRENT) USE OF INSULIN: ICD-10-CM

## 2017-11-15 DIAGNOSIS — Z90.89 ACQUIRED ABSENCE OF OTHER ORGANS: Chronic | ICD-10-CM

## 2017-11-15 PROCEDURE — 99283 EMERGENCY DEPT VISIT LOW MDM: CPT

## 2017-11-15 NOTE — ED ADULT TRIAGE NOTE - OTHER COMPLAINTS
pt unable to make appt with doctor, needs levothyroxine refill. pt hypertensive, hx htn, admits to taking daily med, reports being extremely agitated.

## 2017-11-15 NOTE — ED PROVIDER NOTE - CONSTITUTIONAL, MLM
normal... Well appearing,  and in no apparent distress, but agitated about the situation with his escriptions

## 2017-11-15 NOTE — ED PROVIDER NOTE - MEDICAL DECISION MAKING DETAILS
51 yo male ion the ER for medications refill. Pt is very agitated and upset, has been trying to get his rx sent to pharmacy by his PMD  for a few days. Pharmacy still did not received an electronic prescription. Pt has f/u appointment scheduled for 11/28/17. Has no other complaints. has elevated BV, but asymptomatic, reports that he needs to take his evening meds. will sent a refill for synthroid and d/c home for out pt f/u at clinic as scheduled. Importance to take BP meds explained.

## 2017-11-15 NOTE — ED ADULT NURSE NOTE - OBJECTIVE STATEMENT
Pt presents to ED for med refill. Pt reports running out of synthroid rx, called PCP but PCP still hasn't sent rx. Pt has no medical complaints at this time. Pt HTN in triage, states "I took my meds today, I'm just frustrated with this situation so I'm stressed." Pt presents in NAD speaking full sentences ambulatory through triage.

## 2017-11-15 NOTE — ED PROVIDER NOTE - OBJECTIVE STATEMENT
53 yo male with h/o HTN, Hypothyroid, in the ER for medications refill. Pt reports that he just needs his thyroid medications to be refilled. Has all other medications at home. His f/u PMD appointment is on 11/28/2017. Pt has no other complaints.

## 2017-11-15 NOTE — ED ADULT NURSE REASSESSMENT NOTE - NS ED NURSE REASSESS COMMENT FT1
Pt HTN at discharge, JL butt. Pt asymptomatic no HA no dizziness no CP no SOB, per omid alexander for DC.

## 2017-11-28 ENCOUNTER — APPOINTMENT (OUTPATIENT)
Dept: INTERNAL MEDICINE | Facility: CLINIC | Age: 52
End: 2017-11-28

## 2019-02-15 RX ORDER — ATORVASTATIN CALCIUM 80 MG/1
80 TABLET, FILM COATED ORAL DAILY
Qty: 30 | Refills: 0 | Status: ACTIVE | COMMUNITY
Start: 2017-03-17 | End: 1900-01-01

## 2019-12-04 NOTE — H&P ADULT - I WAS PHYSICALLY PRESENT FOR THE KEY PORTIONS OF THE EVALUATION AND MANAGEMENT (E/M) SERVICE PROVIDED.  I AGREE WITH THE ABOVE HISTORY, PHYSICAL, AND PLAN WHICH I HAVE REVIEWED AND EDITED WHERE APPROPRIATE
December 4, 2019         Patient: Ross Mcallister   YOB: 1949         To Whom it May Concern:      Patient may not lift anything greater than 15 lbs, no bending, no walking limitations.    Please call clinic if any questions or concerns.        Sincerely,         Saskia Michele MD                     Statement Selected

## 2020-10-08 NOTE — ED ADULT NURSE NOTE - CADM POA CENTRAL LINE
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

## 2020-12-16 PROBLEM — Z12.11 ENCOUNTER FOR SCREENING FOR MALIGNANT NEOPLASM OF COLON: Status: RESOLVED | Noted: 2017-06-05 | Resolved: 2020-12-16

## 2022-01-20 NOTE — PROGRESS NOTE ADULT - PROBLEM SELECTOR PLAN 1
Progress Note    Patient ID: Nory is a 37 year old male        SUBJECTIVE:     Chief Complaint   Patient presents with   • Establish Care     Pt is here to establish care.   Pt is concerned he has a hemorrhoid- occ gets pain in rectal area with bm. Occ blood when he wipes.  A few mos ago-pain with sitting. This has improved. No meds tried.      History  Current Outpatient Medications   Medication Sig   • hydroCORTisone (ANUSOL-HC) 2.5 % rectal cream Apply to rectum daily     No current facility-administered medications for this visit.     ALLERGIES:  No Known Allergies  Family History   Problem Relation Age of Onset   • Diabetes Neg Hx    • Coronary Artery Disease Neg Hx    • Cancer Neg Hx      Review of patient's social economics indicates:   [facility]- resource department               Alcohol Use: Yes                Comment: [facility]    Social History     Tobacco Use   Smoking Status Never Smoker   Smokeless Tobacco Never Used       Past Surgical History:   Procedure Laterality Date   • No past surgeries         Review of Systems   Constitutional: Negative for appetite change, fatigue and fever.   HENT: Negative for congestion, sinus pressure and sore throat.    Eyes: Negative for visual disturbance.   Respiratory: Negative for cough, shortness of breath and wheezing.    Cardiovascular: Negative for chest pain and palpitations.   Gastrointestinal: Negative for abdominal pain, blood in stool, constipation, diarrhea and nausea.   Endocrine: Negative.  Negative for cold intolerance, heat intolerance, polydipsia, polyphagia and polyuria.   Genitourinary: Negative for dysuria, frequency and hematuria.   Musculoskeletal: Negative for back pain, gait problem and joint swelling.   Neurological: Negative for tremors, syncope, weakness, numbness and headaches.   Psychiatric/Behavioral: Negative for confusion, dysphoric mood and suicidal ideas. The patient is not nervous/anxious.          OBJECTIVE:     Visit Vitals  BP  130/80   Pulse 60   Temp 97.7 °F (36.5 °C)   Resp 12   Ht 5' 5\" (1.651 m)   Wt 79.8 kg (176 lb)   BMI 29.29 kg/m²     Physical Exam  Constitutional:       General: He is not in acute distress.     Appearance: Normal appearance. He is well-developed.   HENT:      Mouth/Throat:      Pharynx: No oropharyngeal exudate.   Eyes:      Conjunctiva/sclera: Conjunctivae normal.      Pupils: Pupils are equal, round, and reactive to light.   Neck:      Thyroid: No thyromegaly.      Trachea: No tracheal deviation.   Cardiovascular:      Rate and Rhythm: Normal rate and regular rhythm.      Pulses:           Carotid pulses are 2+ on the right side and 2+ on the left side.       Dorsalis pedis pulses are 2+ on the right side and 2+ on the left side.        Posterior tibial pulses are 2+ on the right side and 2+ on the left side.      Heart sounds: Normal heart sounds, S1 normal and S2 normal. No murmur heard.   No systolic murmur is present.   No diastolic murmur is present.  Pulmonary:      Effort: No accessory muscle usage or respiratory distress.      Breath sounds: No decreased breath sounds, wheezing or rales.   Abdominal:      General: Bowel sounds are normal. There is no distension.      Palpations: Abdomen is soft. There is no hepatomegaly, splenomegaly or mass.      Tenderness: There is no abdominal tenderness.      Hernia: No hernia is present.   Genitourinary:     Comments: Large hemorrhoid- external, not inflammed  Musculoskeletal:         General: Normal range of motion.   Lymphadenopathy:      Cervical: No cervical adenopathy.   Skin:     General: Skin is warm and dry.   Psychiatric:         Speech: Speech normal.           ASSESSMENT AND PLAN:       Nory was seen today for establish care.    Diagnoses and all orders for this visit:    Other hemorrhoids  -     SERVICE TO SURGERY COLORECTAL    Routine adult health maintenance  -     CBC WITH DIFFERENTIAL  -     COMPREHENSIVE METABOLIC PANEL  -     LIPID PANEL  WITHOUT REFLEX  -     THYROID STIMULATING HORMONE  -     VITAMIN D -25 HYDROXY    Other orders  -     hydroCORTisone (ANUSOL-HC) 2.5 % rectal cream; Apply to rectum daily      Problem List Items Addressed This Visit        Gastrointestinal and Abdominal    Other hemorrhoids - Primary     Colorectal surgeon- Dr. Wu. Fiber. Hydrocortisone cream.         Relevant Orders    SERVICE TO SURGERY COLORECTAL       Health Encounters    Routine adult health maintenance     Suggest more exercise. Weight reduction. Routine labs.          Relevant Orders    CBC WITH DIFFERENTIAL    COMPREHENSIVE METABOLIC PANEL    LIPID PANEL WITHOUT REFLEX    THYROID STIMULATING HORMONE    VITAMIN D -25 HYDROXY         resolved w/ no focal deficits on exam, still unclear etiology at this point. Pt on admission in severe sepsis w/ fever and rigors on admission, elevated WBCs, and AMS. CSF high in glucose, elevated protein 74, and w/ little WBCs present; possible encephalitis etiology leading to seizure-like activity vs hypertensive emergency w/ possible acute infarcts seen on MRI. CT head showing diminished attenuation in L woody, CTA head done w/o evidence of critical stenosis, thus low likelihood of stroke. DKA (now resolved) also possibly contributing to AMS, pt more alert this AM; UA and CXR w/o evidence of infection. MRI read w/ various acute infarcts concerning for septic vs thrombotic emboli, also showing mild diffuse ventriculomegaly; CSF Cx/BCx/UCx NGTD, TTE neg for vegetations.  Pt now extubated and mentating well  - Discontinue Vanc/ Ceftriaxone per ID  - Repeat MRI brain w/ and w/o contrast for further eval  -general neuro consulted  -EEG f/u - no seizure activity to date, will continue to monitor as per neuro  -ordered for repeat MRI brain w/ and w/o contrast for further eval resolved w/ no focal deficits on exam, still unclear etiology at this point. Pt on admission in severe sepsis w/ fever and rigors on admission, elevated WBCs, and AMS. CSF high in glucose, elevated protein 74, and w/ little WBCs present; possible encephalitis etiology leading to seizure-like activity vs hypertensive emergency w/ possible acute infarcts seen on MRI. CT head showing diminished attenuation in L woody, CTA head done w/o evidence of critical stenosis, thus low likelihood of stroke. DKA (now resolved) also possibly contributing to AMS, pt more alert this AM; UA and CXR w/o evidence of infection. MRI read w/ various acute infarcts concerning for septic vs thrombotic emboli, also showing mild diffuse ventriculomegaly; CSF Cx/BCx/UCx NGTD, TTE neg for vegetations.  Pt now extubated and mentating well  - Discontinue Vanc/ Ceftriaxone per ID  - Repeat MRI brain w/ and w/o contrast for further eval  - f/u Neuro recs resolved w/ no focal deficits on exam, still unclear etiology at this point. Pt on admission in severe sepsis w/ fever and rigors on admission, elevated WBCs, and AMS. CSF high in glucose, elevated protein 74, and w/ little WBCs present; possible encephalitis etiology leading to seizure-like activity vs hypertensive emergency w/ possible acute infarcts seen on MRI. CT head showing diminished attenuation in L woody, CTA head done w/o evidence of critical stenosis, thus low likelihood of stroke. DKA (now resolved) also possibly contributing to AMS, pt more alert this AM; UA and CXR w/o evidence of infection. MRI read w/ various acute infarcts concerning for septic vs thrombotic emboli, also showing mild diffuse ventriculomegaly; CSF Cx/BCx/UCx NGTD, TTE neg for vegetations.  Pt now extubated and mentating well  - Discontinue Vanc/ Ceftriaxone per ID  - BP control, current regimen: HCTZ 50mg daily, valsartan 320mg daily, amlodipine 10mg daily, metoprolol to be switched to Coreg 6.25mg BID  - Repeat MRI brain w/ and w/o contrast for further eval  - f/u Neuro recs